# Patient Record
Sex: FEMALE | Race: WHITE | NOT HISPANIC OR LATINO | Employment: UNEMPLOYED | URBAN - METROPOLITAN AREA
[De-identification: names, ages, dates, MRNs, and addresses within clinical notes are randomized per-mention and may not be internally consistent; named-entity substitution may affect disease eponyms.]

---

## 2017-03-29 RX ORDER — LISINOPRIL 10 MG/1
TABLET ORAL
Qty: 90 TABLET | Refills: 0 | Status: SHIPPED | OUTPATIENT
Start: 2017-03-29 | End: 2017-04-10 | Stop reason: SDUPTHER

## 2017-04-10 ENCOUNTER — OFFICE VISIT (OUTPATIENT)
Dept: INTERNAL MEDICINE | Facility: CLINIC | Age: 52
End: 2017-04-10

## 2017-04-10 VITALS
HEART RATE: 82 BPM | BODY MASS INDEX: 33.59 KG/M2 | DIASTOLIC BLOOD PRESSURE: 78 MMHG | RESPIRATION RATE: 16 BRPM | SYSTOLIC BLOOD PRESSURE: 118 MMHG | WEIGHT: 205 LBS

## 2017-04-10 DIAGNOSIS — F41.9 ANXIETY: ICD-10-CM

## 2017-04-10 DIAGNOSIS — I10 ESSENTIAL HYPERTENSION: Primary | ICD-10-CM

## 2017-04-10 DIAGNOSIS — Z12.11 SCREENING FOR COLON CANCER: ICD-10-CM

## 2017-04-10 DIAGNOSIS — Z11.59 NEED FOR HEPATITIS C SCREENING TEST: ICD-10-CM

## 2017-04-10 DIAGNOSIS — F32.A DEPRESSION, UNSPECIFIED DEPRESSION TYPE: ICD-10-CM

## 2017-04-10 DIAGNOSIS — E03.9 HYPOTHYROIDISM, UNSPECIFIED TYPE: ICD-10-CM

## 2017-04-10 PROCEDURE — 99214 OFFICE O/P EST MOD 30 MIN: CPT | Performed by: INTERNAL MEDICINE

## 2017-04-10 RX ORDER — THYROID,PORK 130 MG
130 TABLET ORAL DAILY
COMMUNITY
Start: 2017-03-24 | End: 2017-10-09

## 2017-04-10 RX ORDER — FLUOXETINE HYDROCHLORIDE 20 MG/1
20 CAPSULE ORAL DAILY
Qty: 90 CAPSULE | Refills: 3 | Status: SHIPPED | OUTPATIENT
Start: 2017-04-10 | End: 2019-06-24 | Stop reason: SDUPTHER

## 2017-04-10 RX ORDER — LISINOPRIL 10 MG/1
10 TABLET ORAL DAILY
Qty: 90 TABLET | Refills: 3 | Status: SHIPPED | OUTPATIENT
Start: 2017-04-10 | End: 2018-01-30 | Stop reason: DRUGHIGH

## 2017-04-10 NOTE — PROGRESS NOTES
Subjective   Razia Sequeira is a 51 y.o. female.   Pt is here to follow up on HTN,Hypothyroidism,depression/anxiety.             History of Present Illness   Pt is here to follow up on HTN,Hypothyroidism,depression/anxiety.   Went to  for Hypothyroidism. TSH= 1.21 and Free T4= 0.8 on 3/10/2017.  LDL was 164 in 12/6/2016.   She has recently given up gluten and dairy as she is reading a book specializing in thyroid disorders as well.   She states she is feeling well and feels like all chronic issues are controlled.           The following portions of the patient's history were reviewed and updated as appropriate: allergies, current medications, past family history, past medical history, past social history, past surgical history and problem list.             Review of Systems   Constitutional: Negative.    HENT: Negative.    Eyes: Negative.    Respiratory: Negative.    Cardiovascular:        See HPI.    Gastrointestinal: Negative.    Endocrine:        See HPI.    Genitourinary: Negative.    Musculoskeletal: Negative.    Skin: Negative.    Allergic/Immunologic: Negative.    Neurological: Negative.    Hematological: Negative.    Psychiatric/Behavioral:        See HPI.                 Objective   Physical Exam   Constitutional: She is oriented to person, place, and time. She appears well-developed and well-nourished.   HENT:   Head: Normocephalic and atraumatic.   Right Ear: External ear normal.   Left Ear: External ear normal.   Nose: Nose normal.   Mouth/Throat: Oropharynx is clear and moist.   Eyes: Conjunctivae and EOM are normal. Pupils are equal, round, and reactive to light.   Neck: Normal range of motion. Neck supple. No tracheal deviation present. No thyromegaly present.   Cardiovascular: Normal rate, regular rhythm, normal heart sounds and intact distal pulses.    Pulmonary/Chest: Effort normal and breath sounds normal.   Abdominal: Soft. Bowel sounds are normal. She exhibits no distension. There  is no tenderness.   Neurological: She is alert and oriented to person, place, and time. She has normal reflexes.   Skin: Skin is warm and dry.   Psychiatric: She has a normal mood and affect.   Nursing note and vitals reviewed.               Assessment/Plan      Essential hypertension  -     lisinopril (PRINIVIL,ZESTRIL) 10 MG tablet; Take 1 tablet by mouth Daily.  -     Cancel: Comprehensive Metabolic Panel  -     Cancel: Lipid Panel  -     Cancel: CBC (No Diff)    Hypothyroidism, unspecified type  -     Cancel: T4, Free  -     Cancel: TSH    Depression, unspecified depression type  -     FLUoxetine (PROzac) 20 MG capsule; Take 1 capsule by mouth Daily.    Anxiety  -     FLUoxetine (PROzac) 20 MG capsule; Take 1 capsule by mouth Daily.    Screening for colon cancer  -     Amb referral for Screening Colonoscopy    Need for hepatitis C screening test  -     Cancel: Hepatitis C Antibody        1.) Colonoscopy referral placed.   2.) Refill chronic meds   3.) Pt will do Hep C screening with next blood draw   4.) Follow up in 6 months   5.) Instructed to check with pharmacy about Tetanus vaccine.

## 2017-08-17 ENCOUNTER — TELEPHONE (OUTPATIENT)
Dept: INTERNAL MEDICINE | Facility: CLINIC | Age: 52
End: 2017-08-17

## 2017-08-17 NOTE — TELEPHONE ENCOUNTER
----- Message from Luana Bloom sent at 8/17/2017  9:04 AM EDT -----  Contact: SELF  JOHNSON BORJA WENT TO DR ROBLERO AND WAS PRESCRIBED A TESTOSTERONE, ESTRAGIN AND PROGESTERONE COMPOUND. SHE PLANNED OF DOING HER FUP FROM [facility] WITH FILIBERTO WITH ONE OF THE APRNS BUT SHE WANTS TO KNOW IF ANYONE AT THIS OFFICE WOULD BE ABLE TO PRESCRIBE HER THIS COMPOUND.   SHE CAN BE REACHED [phone]

## 2017-08-21 NOTE — TELEPHONE ENCOUNTER
Spoke with pt and informed her of statement below. Pt is curious why testosterone is not given to women. Pt doesn't want to schedule an apt and not able to get this prescribed.

## 2017-08-21 NOTE — TELEPHONE ENCOUNTER
It just depends on the provider.  We tend not to give testosterone to women.  She can make an appt with the APRN but would need to bring in the compound to know the percentage of each.

## 2017-08-28 NOTE — TELEPHONE ENCOUNTER
This treatment is considered more alternative medicine.  So have no way of knowing if one of the new APRNs would fill it.

## 2017-08-28 NOTE — TELEPHONE ENCOUNTER
Pt informed of statement below. Pt verbalized understanding and stated that she will call back next month to see if APRN's has gotten here and will schedule apt then to discuss.

## 2017-10-02 ENCOUNTER — OFFICE VISIT (OUTPATIENT)
Dept: INTERNAL MEDICINE | Facility: CLINIC | Age: 52
End: 2017-10-02

## 2017-10-02 VITALS
RESPIRATION RATE: 18 BRPM | HEART RATE: 94 BPM | SYSTOLIC BLOOD PRESSURE: 124 MMHG | WEIGHT: 205 LBS | OXYGEN SATURATION: 99 % | DIASTOLIC BLOOD PRESSURE: 100 MMHG | BODY MASS INDEX: 33.59 KG/M2 | TEMPERATURE: 98.1 F

## 2017-10-02 DIAGNOSIS — R68.82 DECREASED LIBIDO: ICD-10-CM

## 2017-10-02 DIAGNOSIS — E03.9 HYPOTHYROIDISM, UNSPECIFIED TYPE: Primary | ICD-10-CM

## 2017-10-02 DIAGNOSIS — I10 ESSENTIAL HYPERTENSION: ICD-10-CM

## 2017-10-02 PROBLEM — Z12.11 SCREENING FOR COLON CANCER: Status: RESOLVED | Noted: 2017-04-10 | Resolved: 2017-10-02

## 2017-10-02 PROBLEM — Z11.59 NEED FOR HEPATITIS C SCREENING TEST: Status: RESOLVED | Noted: 2017-04-10 | Resolved: 2017-10-02

## 2017-10-02 LAB
ALBUMIN SERPL-MCNC: 4.5 G/DL (ref 3.2–4.8)
ALBUMIN/GLOB SERPL: 1.6 G/DL (ref 1.5–2.5)
ALP SERPL-CCNC: 111 U/L (ref 25–100)
ALT SERPL W P-5'-P-CCNC: 34 U/L (ref 7–40)
ANION GAP SERPL CALCULATED.3IONS-SCNC: 5 MMOL/L (ref 3–11)
ARTICHOKE IGE QN: 163 MG/DL (ref 0–130)
AST SERPL-CCNC: 26 U/L (ref 0–33)
BILIRUB SERPL-MCNC: 0.4 MG/DL (ref 0.3–1.2)
BUN BLD-MCNC: 11 MG/DL (ref 9–23)
BUN/CREAT SERPL: 13.8 (ref 7–25)
CALCIUM SPEC-SCNC: 10 MG/DL (ref 8.7–10.4)
CHLORIDE SERPL-SCNC: 106 MMOL/L (ref 99–109)
CHOLEST SERPL-MCNC: 231 MG/DL (ref 0–200)
CO2 SERPL-SCNC: 29 MMOL/L (ref 20–31)
CREAT BLD-MCNC: 0.8 MG/DL (ref 0.6–1.3)
DEPRECATED RDW RBC AUTO: 41.2 FL (ref 37–54)
ERYTHROCYTE [DISTWIDTH] IN BLOOD BY AUTOMATED COUNT: 13 % (ref 11.3–14.5)
ESTRADIOL SERPL HS-MCNC: 19 PG/ML
GFR SERPL CREATININE-BSD FRML MDRD: 75 ML/MIN/1.73
GLOBULIN UR ELPH-MCNC: 2.9 GM/DL
GLUCOSE BLD-MCNC: 102 MG/DL (ref 70–100)
HCT VFR BLD AUTO: 46 % (ref 34.5–44)
HDLC SERPL-MCNC: 58 MG/DL (ref 40–60)
HGB BLD-MCNC: 14.9 G/DL (ref 11.5–15.5)
MCH RBC QN AUTO: 28 PG (ref 27–31)
MCHC RBC AUTO-ENTMCNC: 32.4 G/DL (ref 32–36)
MCV RBC AUTO: 86.3 FL (ref 80–99)
PLATELET # BLD AUTO: 237 10*3/MM3 (ref 150–450)
PMV BLD AUTO: 11.6 FL (ref 6–12)
POTASSIUM BLD-SCNC: 4.8 MMOL/L (ref 3.5–5.5)
PROGEST SERPL-MCNC: 3.51 NG/ML
PROT SERPL-MCNC: 7.4 G/DL (ref 5.7–8.2)
RBC # BLD AUTO: 5.33 10*6/MM3 (ref 3.89–5.14)
SODIUM BLD-SCNC: 140 MMOL/L (ref 132–146)
T4 FREE SERPL-MCNC: 0.98 NG/DL (ref 0.89–1.76)
TRIGL SERPL-MCNC: 85 MG/DL (ref 0–150)
TSH SERPL DL<=0.05 MIU/L-ACNC: 0.01 MIU/ML (ref 0.35–5.35)
WBC NRBC COR # BLD: 4.21 10*3/MM3 (ref 3.5–10.8)

## 2017-10-02 PROCEDURE — 99214 OFFICE O/P EST MOD 30 MIN: CPT | Performed by: PHYSICIAN ASSISTANT

## 2017-10-02 PROCEDURE — 80053 COMPREHEN METABOLIC PANEL: CPT | Performed by: PHYSICIAN ASSISTANT

## 2017-10-02 PROCEDURE — 82670 ASSAY OF TOTAL ESTRADIOL: CPT | Performed by: PHYSICIAN ASSISTANT

## 2017-10-02 PROCEDURE — 84403 ASSAY OF TOTAL TESTOSTERONE: CPT | Performed by: PHYSICIAN ASSISTANT

## 2017-10-02 PROCEDURE — 84443 ASSAY THYROID STIM HORMONE: CPT | Performed by: PHYSICIAN ASSISTANT

## 2017-10-02 PROCEDURE — 84144 ASSAY OF PROGESTERONE: CPT | Performed by: PHYSICIAN ASSISTANT

## 2017-10-02 PROCEDURE — 84402 ASSAY OF FREE TESTOSTERONE: CPT | Performed by: PHYSICIAN ASSISTANT

## 2017-10-02 PROCEDURE — 80061 LIPID PANEL: CPT | Performed by: PHYSICIAN ASSISTANT

## 2017-10-02 PROCEDURE — 84439 ASSAY OF FREE THYROXINE: CPT | Performed by: PHYSICIAN ASSISTANT

## 2017-10-02 PROCEDURE — 85027 COMPLETE CBC AUTOMATED: CPT | Performed by: PHYSICIAN ASSISTANT

## 2017-10-02 PROCEDURE — 36415 COLL VENOUS BLD VENIPUNCTURE: CPT | Performed by: PHYSICIAN ASSISTANT

## 2017-10-02 NOTE — PROGRESS NOTES
Subjective   Razia Sequeira is a 52 y.o. female.   Chief Complaint   Patient presents with   • Hypothyroidism     f/u     History of Present Illness   Pt here for thyroid f/u.  In March 2017 TSH was normal.  Is on WP thyroid 130 tab, was seeing [facility] office, Alyx Butler MD who was giving compounded creams as well: testosterone, progesterone, and estradiol, for decreased libido.    Has felt the best since on this regimen.  Requests that we fill these Rxs.    Hx of complete hysterectomy.    The following portions of the patient's history were reviewed and updated as appropriate: allergies, current medications and problem list.    Review of Systems   Constitutional: Negative for fatigue.   Eyes: Negative for visual disturbance.   Respiratory: Negative for shortness of breath.    Cardiovascular: Negative for chest pain.   Neurological: Negative for headaches.   Psychiatric/Behavioral: Negative for sleep disturbance.       Objective   Physical Exam   Constitutional: She appears well-developed and well-nourished.   HENT:   Head: Normocephalic and atraumatic.   Right Ear: External ear normal.   Left Ear: External ear normal.   Eyes: Conjunctivae are normal.   Neck: No thyromegaly present.   Cardiovascular: Normal rate, regular rhythm and normal heart sounds.  Exam reveals no gallop and no friction rub.    No murmur heard.  Pulmonary/Chest: Effort normal and breath sounds normal.   Psychiatric: She has a normal mood and affect.   Vitals reviewed.  Pt is a bit confrontational.    Assessment/Plan   Raiza was seen today for hypothyroidism.    Diagnoses and all orders for this visit:    Hypothyroidism, unspecified type  -     Lipid Panel  -     TSH  -     T4, Free    Essential hypertension  -     CBC (No Diff)  -     Comprehensive Metabolic Panel    Decreased libido  -     Testosterone, Free, Total  -     Estradiol  -     Progesterone        She will test BP at home to see if diasystolic continues to be  elevated.    She understands there may be risks to being on hormone therapy as she has been doing (bioidentical hormones).  She is willing to accept these risks because of the quality of life that they provide.  She understands that if adjustment is required, then she may need to go back to Dr Butler.  She will have [facility] pharmacy send us rxs for refill.  Told her I wasn't sure if the future APRN was willing to fill these.

## 2017-10-04 ENCOUNTER — TELEPHONE (OUTPATIENT)
Dept: INTERNAL MEDICINE | Facility: CLINIC | Age: 52
End: 2017-10-04

## 2017-10-04 NOTE — TELEPHONE ENCOUNTER
----- Message from Lydia Wallace MA sent at 10/4/2017 11:58 AM EDT -----  No pharmacy able to get the WP thyroid med and wants to go back on her synthroid and cytomel.  Would like a call to discuss.       Pt [phone]

## 2017-10-05 LAB
TESTOST FREE SERPL-MCNC: 2.6 PG/ML (ref 0–4.2)
TESTOST SERPL-MCNC: 38 NG/DL (ref 3–41)

## 2017-10-06 DIAGNOSIS — E03.8 OTHER SPECIFIED HYPOTHYROIDISM: ICD-10-CM

## 2017-10-06 RX ORDER — LEVOTHYROXINE SODIUM 88 UG/1
TABLET ORAL
Qty: 30 TABLET | Refills: 5 | Status: SHIPPED | OUTPATIENT
Start: 2017-10-06 | End: 2018-05-11 | Stop reason: SDUPTHER

## 2017-10-06 RX ORDER — LEVOTHYROXINE SODIUM 88 UG/1
88 TABLET ORAL DAILY
Qty: 30 TABLET | Refills: 2 | Status: CANCELLED | OUTPATIENT
Start: 2017-10-06

## 2017-10-06 NOTE — TELEPHONE ENCOUNTER
I tried to call her yesterday to discuss.  TSH is showing that she is taking too much thyroid medication, so is even more hyper-thryoid than last year.  This can cause palpitations of the heart and diarrhea among other things.    I have sent in levothyroxin 88mcg and liothyronin 5 mg.    She will need repeat blood work in 2 months to see if this appropriate dose.    Also I spoke with 2 providers here and they do not do bio-identical hormones.  One suggested she see GYN to see if they do this.  I will send lab letter with results by mail.  Cholesterol has gone up a bit from last year and red blood cell count is eelevated along with hemocrit which is what we watch when on testosterone.  I don't know what the other doc would say is too high that would require coming off testosterone or decreasing dose.

## 2017-10-06 NOTE — TELEPHONE ENCOUNTER
----- Message from Lidia Judge sent at 10/6/2017  9:28 AM EDT -----  Patient called in regards to her thyroid medicine being completely out of stock at pharmacy. Patient stated there is an old thyroid medicine she used to be on that she has requested be called into pharmacy. Patient is out of thyroid medicine and states she needs called in today.     Pharmacy: [facility] [place] pharmacy    Call back number for patient: [phone]

## 2017-10-09 RX ORDER — LIOTHYRONINE SODIUM 5 UG/1
5 TABLET ORAL DAILY
Qty: 30 TABLET | Refills: 2 | Status: SHIPPED | OUTPATIENT
Start: 2017-10-09 | End: 2018-02-01 | Stop reason: SDUPTHER

## 2017-10-09 RX ORDER — LIOTHYRONINE SODIUM 5 UG/1
TABLET ORAL
Qty: 90 TABLET | Refills: 3 | OUTPATIENT
Start: 2017-10-09

## 2017-10-09 NOTE — TELEPHONE ENCOUNTER
Pt notified of statement below. Pt verbalized understanding. Rx Cytomel did not get sent to pharmacy, please send to pharmacy.

## 2017-10-24 ENCOUNTER — LAB (OUTPATIENT)
Dept: LAB | Facility: HOSPITAL | Age: 52
End: 2017-10-24

## 2017-10-24 ENCOUNTER — TRANSCRIBE ORDERS (OUTPATIENT)
Dept: LAB | Facility: HOSPITAL | Age: 52
End: 2017-10-24

## 2017-10-24 DIAGNOSIS — Z79.890 NEED FOR PROPHYLACTIC HORMONE REPLACEMENT THERAPY (POSTMENOPAUSAL): ICD-10-CM

## 2017-10-24 DIAGNOSIS — Z79.890 NEED FOR PROPHYLACTIC HORMONE REPLACEMENT THERAPY (POSTMENOPAUSAL): Primary | ICD-10-CM

## 2017-10-24 LAB
ESTRADIOL SERPL HS-MCNC: 28 PG/ML
FSH SERPL-ACNC: 27 MIU/ML
PROGEST SERPL-MCNC: >60 NG/ML
TESTOST SERPL-MCNC: 44.9 NG/DL
TSH SERPL DL<=0.05 MIU/L-ACNC: 0.09 MIU/ML (ref 0.35–5.35)

## 2017-10-24 PROCEDURE — 84443 ASSAY THYROID STIM HORMONE: CPT | Performed by: OBSTETRICS & GYNECOLOGY

## 2017-10-24 PROCEDURE — 36415 COLL VENOUS BLD VENIPUNCTURE: CPT | Performed by: OBSTETRICS & GYNECOLOGY

## 2017-10-24 PROCEDURE — 84144 ASSAY OF PROGESTERONE: CPT | Performed by: OBSTETRICS & GYNECOLOGY

## 2017-10-24 PROCEDURE — 84481 FREE ASSAY (FT-3): CPT | Performed by: OBSTETRICS & GYNECOLOGY

## 2017-10-24 PROCEDURE — 84402 ASSAY OF FREE TESTOSTERONE: CPT | Performed by: OBSTETRICS & GYNECOLOGY

## 2017-10-24 PROCEDURE — 83001 ASSAY OF GONADOTROPIN (FSH): CPT | Performed by: OBSTETRICS & GYNECOLOGY

## 2017-10-24 PROCEDURE — 84403 ASSAY OF TOTAL TESTOSTERONE: CPT | Performed by: OBSTETRICS & GYNECOLOGY

## 2017-10-24 PROCEDURE — 82670 ASSAY OF TOTAL ESTRADIOL: CPT | Performed by: OBSTETRICS & GYNECOLOGY

## 2017-10-24 PROCEDURE — 84270 ASSAY OF SEX HORMONE GLOBUL: CPT | Performed by: OBSTETRICS & GYNECOLOGY

## 2017-10-24 PROCEDURE — 82627 DEHYDROEPIANDROSTERONE: CPT | Performed by: OBSTETRICS & GYNECOLOGY

## 2017-10-25 LAB
DHEA-S SERPL-MCNC: 333.2 UG/DL (ref 41.2–243.7)
SHBG SERPL-SCNC: 38.5 NMOL/L (ref 17.3–125)
T3FREE SERPL-MCNC: 3.5 PG/ML (ref 2–4.4)

## 2017-10-26 LAB — TESTOST FREE SERPL-MCNC: 3.7 PG/ML (ref 0–4.2)

## 2017-12-21 ENCOUNTER — OFFICE VISIT (OUTPATIENT)
Dept: FAMILY MEDICINE CLINIC | Facility: CLINIC | Age: 52
End: 2017-12-21

## 2017-12-21 VITALS
SYSTOLIC BLOOD PRESSURE: 150 MMHG | HEART RATE: 88 BPM | TEMPERATURE: 98.7 F | BODY MASS INDEX: 34.62 KG/M2 | DIASTOLIC BLOOD PRESSURE: 110 MMHG | HEIGHT: 66 IN | WEIGHT: 215.4 LBS | RESPIRATION RATE: 20 BRPM

## 2017-12-21 DIAGNOSIS — E03.8 HYPOTHYROIDISM DUE TO HASHIMOTO'S THYROIDITIS: Primary | ICD-10-CM

## 2017-12-21 DIAGNOSIS — I10 ESSENTIAL HYPERTENSION: ICD-10-CM

## 2017-12-21 DIAGNOSIS — Z76.89 ESTABLISHING CARE WITH NEW DOCTOR, ENCOUNTER FOR: ICD-10-CM

## 2017-12-21 DIAGNOSIS — R63.5 WEIGHT GAIN: ICD-10-CM

## 2017-12-21 DIAGNOSIS — E06.3 HYPOTHYROIDISM DUE TO HASHIMOTO'S THYROIDITIS: Primary | ICD-10-CM

## 2017-12-21 PROCEDURE — 99214 OFFICE O/P EST MOD 30 MIN: CPT | Performed by: NURSE PRACTITIONER

## 2017-12-21 NOTE — PROGRESS NOTES
Subjective   Razia Sequeira is a 52 y.o. female.     History of Present Illness   NP to establish care, moved to  recently, a previous pt of Dr Marquis Jaime in [place]  Thyroid problem taking medication but has been gaining weight (10 lbs) and wants to have labs rechecked  WP thyroid no longer available so taking Cytomel and Levothyroxine  Just joined the gym for new years resolution    BP up today but had it checked at gyn office and it was normal there  Taking Lisinopril daily  Has not been taking it at home  No CP, HA, dizziness or swelling    On HRT   Up to date on health maintenance  Does not get a flu vaccine  Mammogram every 2 years, due next year      The following portions of the patient's history were reviewed and updated as appropriate: allergies, current medications, past family history, past medical history, past social history, past surgical history and problem list.    Review of Systems   Constitutional: Positive for fatigue and unexpected weight change.   HENT: Negative for trouble swallowing.    Eyes: Negative.    Respiratory: Negative for chest tightness.    Cardiovascular: Negative.  Negative for chest pain, palpitations and leg swelling.   Gastrointestinal: Negative for constipation, diarrhea and nausea.   Endocrine: Negative.    Genitourinary: Negative.    Musculoskeletal: Negative.    Skin: Negative.    Neurological: Negative for dizziness, light-headedness and headaches.   Hematological: Negative.    Psychiatric/Behavioral: Negative.        Objective   Physical Exam   Constitutional: She is oriented to person, place, and time. Vital signs are normal. She appears well-developed and well-nourished. No distress.   HENT:   Head: Normocephalic.   Right Ear: Tympanic membrane, external ear and ear canal normal.   Left Ear: Tympanic membrane, external ear and ear canal normal.   Nose: Nose normal. No mucosal edema or rhinorrhea. Right sinus exhibits no maxillary sinus tenderness and no frontal  sinus tenderness. Left sinus exhibits no maxillary sinus tenderness and no frontal sinus tenderness.   Mouth/Throat: Uvula is midline, oropharynx is clear and moist and mucous membranes are normal.   Eyes: Conjunctivae and lids are normal. Pupils are equal, round, and reactive to light.   Neck: Trachea normal and normal range of motion. Neck supple. No JVD present. Carotid bruit is not present. No thyroid mass and no thyromegaly present.   Cardiovascular: Normal rate, regular rhythm, S1 normal, S2 normal, normal heart sounds and intact distal pulses.    Pulmonary/Chest: Effort normal and breath sounds normal.   Abdominal: Soft. Normal appearance and bowel sounds are normal.   Musculoskeletal: Normal range of motion.   Lymphadenopathy:        Head (right side): No tonsillar, no preauricular, no posterior auricular and no occipital adenopathy present.        Head (left side): No tonsillar, no preauricular, no posterior auricular and no occipital adenopathy present.     She has no cervical adenopathy.   Neurological: She is alert and oriented to person, place, and time. She has normal reflexes.   Skin: Skin is warm, dry and intact. No rash noted. No cyanosis. Nails show no clubbing.   Psychiatric: She has a normal mood and affect. Her speech is normal and behavior is normal. Judgment and thought content normal. Cognition and memory are normal.   Nursing note and vitals reviewed.      Assessment/Plan   Razia was seen today for establish care, hypothyroidism, hypertension and weight gain.    Diagnoses and all orders for this visit:    Hypothyroidism due to Hashimoto's thyroiditis  -     TSH  -     T4  -     Comprehensive Metabolic Panel    Essential hypertension    Establishing care with new doctor, encounter for    Weight gain    will check labs and notify pt of results  Will have pt monitor BP over the next week if it remains elevated will need to change BP medication, pt agrees  Will decide follow up pending lab  results.

## 2017-12-22 LAB
ALBUMIN SERPL-MCNC: 4.5 G/DL (ref 3.2–4.8)
ALBUMIN/GLOB SERPL: 1.8 G/DL (ref 1.5–2.5)
ALP SERPL-CCNC: 93 U/L (ref 25–100)
ALT SERPL-CCNC: 62 U/L (ref 7–40)
AST SERPL-CCNC: 54 U/L (ref 0–33)
BILIRUB SERPL-MCNC: 0.6 MG/DL (ref 0.3–1.2)
BUN SERPL-MCNC: 13 MG/DL (ref 9–23)
BUN/CREAT SERPL: 16.3 (ref 7–25)
CALCIUM SERPL-MCNC: 9.7 MG/DL (ref 8.7–10.4)
CHLORIDE SERPL-SCNC: 105 MMOL/L (ref 99–109)
CO2 SERPL-SCNC: 26 MMOL/L (ref 20–31)
CREAT SERPL-MCNC: 0.8 MG/DL (ref 0.6–1.3)
GLOBULIN SER CALC-MCNC: 2.5 GM/DL
GLUCOSE SERPL-MCNC: 90 MG/DL (ref 70–100)
POTASSIUM SERPL-SCNC: 4.9 MMOL/L (ref 3.5–5.5)
PROT SERPL-MCNC: 7 G/DL (ref 5.7–8.2)
SODIUM SERPL-SCNC: 140 MMOL/L (ref 132–146)
T4 SERPL-MCNC: 7.5 MCG/DL (ref 4.7–11.4)
TSH SERPL DL<=0.005 MIU/L-ACNC: 0.39 MIU/ML (ref 0.35–5.35)

## 2018-01-29 ENCOUNTER — TELEPHONE (OUTPATIENT)
Dept: FAMILY MEDICINE CLINIC | Facility: CLINIC | Age: 53
End: 2018-01-29

## 2018-01-29 NOTE — TELEPHONE ENCOUNTER
----- Message from Paola Forbes sent at 1/29/2018  9:13 AM EST -----  Contact: FADI; MED REFILL   Pt blood pressure has continued to be high since christmas. She stated that it was dicussed that It could be increased. She would like to have this done if possible please.   She called last week on the 24 th and left a message but no message was found in pt chart.  She is needing this soon.     Thank you so much.     Pharmacy-   [facility] pharmacy      Call back   [phone]

## 2018-01-30 RX ORDER — LIOTHYRONINE SODIUM 5 UG/1
TABLET ORAL
Qty: 30 TABLET | Refills: 2 | OUTPATIENT
Start: 2018-01-30

## 2018-01-30 RX ORDER — LISINOPRIL AND HYDROCHLOROTHIAZIDE 20; 12.5 MG/1; MG/1
1 TABLET ORAL DAILY
Qty: 30 TABLET | Refills: 1 | Status: SHIPPED | OUTPATIENT
Start: 2018-01-30 | End: 2018-04-27 | Stop reason: SDUPTHER

## 2018-01-30 NOTE — TELEPHONE ENCOUNTER
Will change BP med and would like pt to make appointment to follow up to make sure this is the right change. ajc

## 2018-02-01 RX ORDER — LIOTHYRONINE SODIUM 5 UG/1
5 TABLET ORAL DAILY
Qty: 90 TABLET | Refills: 1 | Status: SHIPPED | OUTPATIENT
Start: 2018-02-01 | End: 2019-01-22 | Stop reason: SDUPTHER

## 2018-04-27 RX ORDER — LISINOPRIL AND HYDROCHLOROTHIAZIDE 20; 12.5 MG/1; MG/1
TABLET ORAL
Qty: 30 TABLET | Refills: 1 | Status: SHIPPED | OUTPATIENT
Start: 2018-04-27 | End: 2018-07-05 | Stop reason: SDUPTHER

## 2018-05-11 ENCOUNTER — OFFICE VISIT (OUTPATIENT)
Dept: FAMILY MEDICINE CLINIC | Facility: CLINIC | Age: 53
End: 2018-05-11

## 2018-05-11 ENCOUNTER — TELEPHONE (OUTPATIENT)
Dept: FAMILY MEDICINE CLINIC | Facility: CLINIC | Age: 53
End: 2018-05-11

## 2018-05-11 VITALS
WEIGHT: 220 LBS | RESPIRATION RATE: 16 BRPM | BODY MASS INDEX: 35.36 KG/M2 | TEMPERATURE: 98 F | SYSTOLIC BLOOD PRESSURE: 118 MMHG | HEART RATE: 88 BPM | HEIGHT: 66 IN | DIASTOLIC BLOOD PRESSURE: 80 MMHG

## 2018-05-11 DIAGNOSIS — E03.8 OTHER SPECIFIED HYPOTHYROIDISM: ICD-10-CM

## 2018-05-11 PROCEDURE — 99213 OFFICE O/P EST LOW 20 MIN: CPT | Performed by: NURSE PRACTITIONER

## 2018-05-11 RX ORDER — LEVOTHYROXINE SODIUM 88 UG/1
88 TABLET ORAL DAILY
Qty: 30 TABLET | Refills: 1 | Status: SHIPPED | OUTPATIENT
Start: 2018-05-11 | End: 2019-01-22 | Stop reason: SDUPTHER

## 2018-05-11 NOTE — PROGRESS NOTES
Subjective   Razia Sequeira is a 52 y.o. female.     History of Present Illness   Follow up for Hypothyroidism  Needs refill on thyroid meds   Wants to be changed to [employer] [place] thyroid, has been on it before, wants to be on a more natural medication  Was seeing a Homeopathic doctor but she was cash only did not take insurance and it was expensive    The following portions of the patient's history were reviewed and updated as appropriate: allergies, current medications, past family history, past medical history, past social history, past surgical history and problem list.    Review of Systems   Constitutional: Positive for unexpected weight gain.   HENT: Negative for trouble swallowing.    Endocrine: Negative for cold intolerance and heat intolerance.       Objective   Physical Exam   Constitutional: She is oriented to person, place, and time. She appears well-developed and well-nourished.   HENT:   Head: Normocephalic.   Nose: Nose normal.   Neck: Neck supple. No thyromegaly present.   Cardiovascular: Normal rate, regular rhythm and normal heart sounds.    Pulmonary/Chest: Effort normal and breath sounds normal.   Neurological: She is alert and oriented to person, place, and time.   Psychiatric: She has a normal mood and affect. Her behavior is normal. Judgment and thought content normal.   Nursing note and vitals reviewed.        Assessment/Plan   Razia was seen today for hypothyroidism.    Diagnoses and all orders for this visit:    Other specified hypothyroidism  -     levothyroxine (SYNTHROID, LEVOTHROID) 88 MCG tablet; Take 1 tablet by mouth Daily.    will refill Levothyroxine and offered to refer pt to Endocrinology but she declined   I told pt I do not want to start her on [place] thyroid that I do not have a lot of experience with this medication and would prefer that she see specialist if she wants to change her medication. She declined, she says she has seen 2 other Endocrinologist and neither would change  her medication.

## 2018-05-11 NOTE — TELEPHONE ENCOUNTER
----- Message from Paola Forbes sent at 5/11/2018 11:51 AM EDT -----  Contact: BRIGITTE; PT QUESTION  PT HAS AN UPCOMING APPT WITH DR. CASIANO SHE WANTED TO KNOW IF YOU WOULD STILL BE ABLE TO WRITE FOR HER THYROID MEDICATION UNITIL July WHEN HER APPT IS.       CALL BACK   [phone]

## 2018-06-26 ENCOUNTER — TRANSCRIBE ORDERS (OUTPATIENT)
Dept: ADMINISTRATIVE | Facility: HOSPITAL | Age: 53
End: 2018-06-26

## 2018-06-26 DIAGNOSIS — Z12.31 VISIT FOR SCREENING MAMMOGRAM: Primary | ICD-10-CM

## 2018-07-05 RX ORDER — LISINOPRIL AND HYDROCHLOROTHIAZIDE 20; 12.5 MG/1; MG/1
TABLET ORAL
Qty: 90 TABLET | Refills: 1 | Status: SHIPPED | OUTPATIENT
Start: 2018-07-05 | End: 2019-01-22 | Stop reason: SDUPTHER

## 2018-08-01 ENCOUNTER — HOSPITAL ENCOUNTER (OUTPATIENT)
Dept: MAMMOGRAPHY | Facility: HOSPITAL | Age: 53
Discharge: HOME | End: 2018-08-01
Attending: OBSTETRICS & GYNECOLOGY | Admitting: OBSTETRICS & GYNECOLOGY

## 2018-08-01 DIAGNOSIS — Z12.31 VISIT FOR SCREENING MAMMOGRAM: ICD-10-CM

## 2018-08-01 PROCEDURE — 77067 SCR MAMMO BI INCL CAD: CPT | Performed by: RADIOLOGY

## 2018-08-01 PROCEDURE — 77063 BREAST TOMOSYNTHESIS BI: CPT

## 2018-08-01 PROCEDURE — 77067 SCR MAMMO BI INCL CAD: CPT

## 2018-08-01 PROCEDURE — 77063 BREAST TOMOSYNTHESIS BI: CPT | Performed by: RADIOLOGY

## 2018-08-08 ENCOUNTER — HOSPITAL ENCOUNTER (OUTPATIENT)
Dept: MAMMOGRAPHY | Facility: HOSPITAL | Age: 53
Discharge: HOME | End: 2018-08-08
Admitting: RADIOLOGY

## 2018-08-08 ENCOUNTER — HOSPITAL ENCOUNTER (OUTPATIENT)
Dept: ULTRASOUND IMAGING | Facility: HOSPITAL | Age: 53
Discharge: HOME | End: 2018-08-08

## 2018-08-08 DIAGNOSIS — R92.8 ABNORMAL MAMMOGRAM: ICD-10-CM

## 2018-08-08 PROCEDURE — G0279 TOMOSYNTHESIS, MAMMO: HCPCS

## 2018-08-08 PROCEDURE — 76642 ULTRASOUND BREAST LIMITED: CPT | Performed by: RADIOLOGY

## 2018-08-08 PROCEDURE — 77066 DX MAMMO INCL CAD BI: CPT | Performed by: RADIOLOGY

## 2018-08-08 PROCEDURE — 77066 DX MAMMO INCL CAD BI: CPT

## 2018-08-08 PROCEDURE — 76642 ULTRASOUND BREAST LIMITED: CPT

## 2018-08-08 PROCEDURE — 77062 BREAST TOMOSYNTHESIS BI: CPT | Performed by: RADIOLOGY

## 2018-10-29 ENCOUNTER — LAB (OUTPATIENT)
Dept: LAB | Facility: HOSPITAL | Age: 53
End: 2018-10-29

## 2018-10-29 ENCOUNTER — TRANSCRIBE ORDERS (OUTPATIENT)
Dept: LAB | Facility: HOSPITAL | Age: 53
End: 2018-10-29

## 2018-10-29 DIAGNOSIS — N95.1 SYMPTOMATIC MENOPAUSAL OR FEMALE CLIMACTERIC STATES: ICD-10-CM

## 2018-10-29 DIAGNOSIS — N95.1 SYMPTOMATIC MENOPAUSAL OR FEMALE CLIMACTERIC STATES: Primary | ICD-10-CM

## 2018-10-29 LAB
ESTRADIOL SERPL HS-MCNC: 24 PG/ML
FSH SERPL-ACNC: 27.4 MIU/ML
LH SERPL-ACNC: 15.6 MIU/ML
PROGEST SERPL-MCNC: <0.15 NG/ML
TESTOST SERPL-MCNC: 33.52 NG/DL (ref 0–780.1)
TSH SERPL DL<=0.05 MIU/L-ACNC: 0.48 MIU/ML (ref 0.35–5.35)

## 2018-10-29 PROCEDURE — 36415 COLL VENOUS BLD VENIPUNCTURE: CPT

## 2018-10-29 PROCEDURE — 82627 DEHYDROEPIANDROSTERONE: CPT

## 2018-10-29 PROCEDURE — 84443 ASSAY THYROID STIM HORMONE: CPT

## 2018-10-29 PROCEDURE — 84402 ASSAY OF FREE TESTOSTERONE: CPT

## 2018-10-29 PROCEDURE — 82670 ASSAY OF TOTAL ESTRADIOL: CPT

## 2018-10-29 PROCEDURE — 84403 ASSAY OF TOTAL TESTOSTERONE: CPT

## 2018-10-29 PROCEDURE — 83001 ASSAY OF GONADOTROPIN (FSH): CPT

## 2018-10-29 PROCEDURE — 83002 ASSAY OF GONADOTROPIN (LH): CPT

## 2018-10-29 PROCEDURE — 84144 ASSAY OF PROGESTERONE: CPT

## 2018-10-30 LAB — DHEA-S SERPL-MCNC: 74 UG/DL (ref 29.4–220.5)

## 2018-10-31 LAB — TESTOST FREE SERPL-MCNC: 3.6 PG/ML (ref 0–4.2)

## 2019-01-22 ENCOUNTER — OFFICE VISIT (OUTPATIENT)
Dept: FAMILY MEDICINE CLINIC | Facility: CLINIC | Age: 54
End: 2019-01-22

## 2019-01-22 VITALS
DIASTOLIC BLOOD PRESSURE: 78 MMHG | SYSTOLIC BLOOD PRESSURE: 128 MMHG | RESPIRATION RATE: 16 BRPM | WEIGHT: 220 LBS | HEART RATE: 84 BPM | BODY MASS INDEX: 35.53 KG/M2 | TEMPERATURE: 97.2 F

## 2019-01-22 DIAGNOSIS — E55.9 VITAMIN D DEFICIENCY: ICD-10-CM

## 2019-01-22 DIAGNOSIS — D22.9 NUMEROUS SKIN MOLES: ICD-10-CM

## 2019-01-22 DIAGNOSIS — E53.8 LOW SERUM VITAMIN B12: ICD-10-CM

## 2019-01-22 DIAGNOSIS — Z13.220 SCREENING, LIPID: ICD-10-CM

## 2019-01-22 DIAGNOSIS — E03.8 OTHER SPECIFIED HYPOTHYROIDISM: Primary | ICD-10-CM

## 2019-01-22 DIAGNOSIS — I10 WELL-CONTROLLED HYPERTENSION: ICD-10-CM

## 2019-01-22 PROCEDURE — 99214 OFFICE O/P EST MOD 30 MIN: CPT | Performed by: NURSE PRACTITIONER

## 2019-01-22 RX ORDER — LIOTHYRONINE SODIUM 5 UG/1
5 TABLET ORAL DAILY
Qty: 90 TABLET | Refills: 1 | Status: SHIPPED | OUTPATIENT
Start: 2019-01-22 | End: 2020-02-07

## 2019-01-22 RX ORDER — LEVOTHYROXINE SODIUM 88 UG/1
88 TABLET ORAL DAILY
Qty: 90 TABLET | Refills: 1 | Status: SHIPPED | OUTPATIENT
Start: 2019-01-22 | End: 2019-06-12 | Stop reason: SDUPTHER

## 2019-01-22 RX ORDER — ESTRADIOL 0.04 MG/D
1 FILM, EXTENDED RELEASE TRANSDERMAL 2 TIMES WEEKLY
COMMUNITY
Start: 2019-01-21 | End: 2021-04-20 | Stop reason: SDUPTHER

## 2019-01-22 RX ORDER — LISINOPRIL AND HYDROCHLOROTHIAZIDE 20; 12.5 MG/1; MG/1
1 TABLET ORAL DAILY
Qty: 90 TABLET | Refills: 1 | Status: SHIPPED | OUTPATIENT
Start: 2019-01-22 | End: 2019-07-28 | Stop reason: SDUPTHER

## 2019-01-22 NOTE — PROGRESS NOTES
Subjective   Razia Sequeira is a 53 y.o. female.     History of Present Illness   Needs refill on her meds  Hypothyroid  Went to see Dr Villanueva and tried hormone replacement and supplements that he suggested but did not feel any better actually felt worse  Wants to go back on Synthroid and needs thyroid labs checked  She had to go back to Gyn also to return to HRT with patch.   HTN  Stable on current medications  No CP, swelling, cough or dizziness, palpitations  Low B12  Her Vit B12 was low on her labs and she was given a B12 injection which made her feel much better but she just did not want to give herself an injection each month  She has numerous moles that she would like to see Dermatology about    The following portions of the patient's history were reviewed and updated as appropriate: allergies, current medications, past family history, past medical history, past social history, past surgical history and problem list.    Review of Systems   Constitutional: Positive for fatigue. Negative for unexpected weight gain and unexpected weight loss.   HENT: Negative.    Respiratory: Negative.    Cardiovascular: Negative.  Negative for chest pain, palpitations and leg swelling.   Gastrointestinal: Negative.    Genitourinary: Negative.  Menstrual problem: hot flashes.   Musculoskeletal: Negative.    Skin: Negative.    Allergic/Immunologic: Negative.    Neurological: Negative.  Negative for dizziness, weakness, light-headedness and numbness.   Hematological: Negative.    Psychiatric/Behavioral: Negative.  Negative for sleep disturbance. The patient is not nervous/anxious.        Objective   Physical Exam   Constitutional: She is oriented to person, place, and time. She appears well-developed and well-nourished.   HENT:   Head: Normocephalic.   Right Ear: External ear normal.   Left Ear: External ear normal.   Nose: Nose normal.   Mouth/Throat: Uvula is midline and oropharynx is clear and moist.   Eyes: Lids are normal.  Pupils are equal, round, and reactive to light.   Neck: Neck supple. No JVD present. Carotid bruit is not present. No thyroid mass and no thyromegaly present.   Cardiovascular: Normal rate, regular rhythm and normal heart sounds.   Pulmonary/Chest: Effort normal and breath sounds normal.   Musculoskeletal: She exhibits no edema.   Lymphadenopathy:     She has no cervical adenopathy.   Neurological: She is alert and oriented to person, place, and time.   Skin: Skin is warm and dry. Capillary refill takes less than 2 seconds. No rash noted.   Psychiatric: She has a normal mood and affect. Her behavior is normal. Judgment and thought content normal.   Nursing note and vitals reviewed.        Assessment/Plan   Razia was seen today for follow-up.    Diagnoses and all orders for this visit:    Other specified hypothyroidism  -     levothyroxine (SYNTHROID, LEVOTHROID) 88 MCG tablet; Take 1 tablet by mouth Daily.  -     liothyronine (CYTOMEL) 5 MCG tablet; Take 1 tablet by mouth Daily.  -     TSH Rfx On Abnormal To Free T4; Future    Well-controlled hypertension  -     lisinopril-hydrochlorothiazide (PRINZIDE,ZESTORETIC) 20-12.5 MG per tablet; Take 1 tablet by mouth Daily.    Numerous skin moles  -     Ambulatory Referral to Dermatology    Low serum vitamin B12  -     Vitamin B12; Future    Vitamin D deficiency  -     Vitamin D 25 hydroxy; Future    Screening, lipid  -     Lipid panel; Future      Pt will return to office when fasting to have labs  Will notify pt of results  Will refill meds as requested  Recommend Dr Judge in [place]

## 2019-01-27 ENCOUNTER — RESULTS ENCOUNTER (OUTPATIENT)
Dept: FAMILY MEDICINE CLINIC | Facility: CLINIC | Age: 54
End: 2019-01-27

## 2019-01-27 DIAGNOSIS — E53.8 LOW SERUM VITAMIN B12: ICD-10-CM

## 2019-01-27 DIAGNOSIS — E03.8 OTHER SPECIFIED HYPOTHYROIDISM: ICD-10-CM

## 2019-01-27 DIAGNOSIS — Z13.220 SCREENING, LIPID: ICD-10-CM

## 2019-01-27 DIAGNOSIS — E55.9 VITAMIN D DEFICIENCY: ICD-10-CM

## 2019-05-29 DIAGNOSIS — I10 WELL-CONTROLLED HYPERTENSION: Primary | ICD-10-CM

## 2019-06-03 ENCOUNTER — RESULTS ENCOUNTER (OUTPATIENT)
Dept: FAMILY MEDICINE CLINIC | Facility: CLINIC | Age: 54
End: 2019-06-03

## 2019-06-03 DIAGNOSIS — I10 WELL-CONTROLLED HYPERTENSION: ICD-10-CM

## 2019-06-10 ENCOUNTER — OFFICE VISIT (OUTPATIENT)
Dept: FAMILY MEDICINE CLINIC | Facility: CLINIC | Age: 54
End: 2019-06-10

## 2019-06-10 VITALS
SYSTOLIC BLOOD PRESSURE: 138 MMHG | HEART RATE: 88 BPM | BODY MASS INDEX: 34.23 KG/M2 | RESPIRATION RATE: 18 BRPM | WEIGHT: 212 LBS | TEMPERATURE: 98.3 F | DIASTOLIC BLOOD PRESSURE: 86 MMHG

## 2019-06-10 DIAGNOSIS — F41.8 SITUATIONAL ANXIETY: Primary | ICD-10-CM

## 2019-06-10 PROCEDURE — 99213 OFFICE O/P EST LOW 20 MIN: CPT | Performed by: FAMILY MEDICINE

## 2019-06-10 RX ORDER — BUSPIRONE HYDROCHLORIDE 15 MG/1
15 TABLET ORAL 3 TIMES DAILY
Qty: 90 TABLET | Refills: 2 | Status: SHIPPED | OUTPATIENT
Start: 2019-06-10 | End: 2019-07-23

## 2019-06-10 RX ORDER — ALPRAZOLAM 0.5 MG/1
0.5 TABLET ORAL 3 TIMES DAILY PRN
Qty: 20 TABLET | Refills: 0 | Status: SHIPPED | OUTPATIENT
Start: 2019-06-10 | End: 2019-07-23

## 2019-06-10 NOTE — PROGRESS NOTES
Subjective   Razia Sequeira is a 53 y.o. female.   Chief Complaint   Patient presents with   • Anxiety     getting worse x 1 week, daughter just announced shes getting       She has been experiencing increased anxiety, after her daughter announced that she is getting . This was very sudden decision and planning to wed in the near future.   Over the years, she has had anxiety and had to be treated temporarily. In the past, she has taken buspirone with good results, however takes time for it to work.   Not sleeping throughout the night, only a couple of hours. Crying often. She has been speaking with her sister, who is helping talk her through this.         Anxiety   Presents for initial visit. Onset was 1 to 4 weeks ago. Symptoms include decreased concentration, excessive worry, insomnia, nervous/anxious behavior, panic and restlessness. Patient reports no chest pain, depressed mood, palpitations, shortness of breath or suicidal ideas. Symptoms occur constantly. The severity of symptoms is causing significant distress. The symptoms are aggravated by family issues. The quality of sleep is poor. Nighttime awakenings: several.     Risk factors include a major life event. Her past medical history is significant for anxiety/panic attacks. There is no history of depression. Past treatments include non-benzodiazephine anxiolytics and counseling (CBT). The treatment provided significant relief. Compliance with prior treatments has been good.        The following portions of the patient's history were reviewed and updated as appropriate: allergies, current medications, past family history, past medical history, past social history, past surgical history and problem list.    Review of Systems   Constitutional: Negative for activity change and appetite change.   Respiratory: Negative for chest tightness and shortness of breath.    Cardiovascular: Negative for chest pain and palpitations.   Gastrointestinal:  Negative.    Neurological: Negative.    Hematological: Negative.    Psychiatric/Behavioral: Positive for decreased concentration and sleep disturbance. Negative for agitation, suicidal ideas and depressed mood. The patient is nervous/anxious and has insomnia.        Objective   Physical Exam   Constitutional: She appears well-developed and well-nourished.   HENT:   Head: Normocephalic and atraumatic.   Right Ear: External ear normal.   Left Ear: External ear normal.   Nose: Nose normal.   Eyes: Conjunctivae are normal.   Cardiovascular: Normal rate, regular rhythm and normal heart sounds.   Pulmonary/Chest: Effort normal and breath sounds normal.   Neurological: She is alert.   Skin: Skin is warm and dry.   Psychiatric: Her speech is normal and behavior is normal. Her mood appears anxious.   Cries easily when discussing current situation with her daughter   Nursing note and vitals reviewed.        Assessment/Plan   Raiza was seen today for anxiety.    Diagnoses and all orders for this visit:    Situational anxiety  -     busPIRone (BUSPAR) 15 MG tablet; Take 1 tablet by mouth 3 (Three) Times a Day.  -     ALPRAZolam (XANAX) 0.5 MG tablet; Take 1 tablet by mouth 3 (Three) Times a Day As Needed for Anxiety or Sleep.      Will have her resume Buspirone.   A temporary supply of alprazolam was provided to help with panic attacks and sleep at night. She is aware that this is only intended for prn use, won't be a chronic medication. PATY query complete. Treatment plan to include limited course of prescribed  controlled substance. Risks including addiction, benefits, and alternatives presented to patient.

## 2019-06-10 NOTE — PATIENT INSTRUCTIONS
Go to the nearest ER or return to clinic if symptoms worsen, fever/chill develop    Living With Anxiety  After being diagnosed with an anxiety disorder, you may be relieved to know why you have felt or behaved a certain way. It is natural to also feel overwhelmed about the treatment ahead and what it will mean for your life. With care and support, you can manage this condition and recover from it.  How to cope with anxiety  Dealing with stress  Stress is your body’s reaction to life changes and events, both good and bad. Stress can last just a few hours or it can be ongoing. Stress can play a major role in anxiety, so it is important to learn both how to cope with stress and how to think about it differently.  Talk with your health care provider or a counselor to learn more about stress reduction. He or she may suggest some stress reduction techniques, such as:  · Music therapy. This can include creating or listening to music that you enjoy and that inspires you.  · Mindfulness-based meditation. This involves being aware of your normal breaths, rather than trying to control your breathing. It can be done while sitting or walking.  · Centering prayer. This is a kind of meditation that involves focusing on a word, phrase, or sacred image that is meaningful to you and that brings you peace.  · Deep breathing. To do this, expand your stomach and inhale slowly through your nose. Hold your breath for 3-5 seconds. Then exhale slowly, allowing your stomach muscles to relax.  · Self-talk. This is a skill where you identify thought patterns that lead to anxiety reactions and correct those thoughts.  · Muscle relaxation. This involves tensing muscles then relaxing them.    Choose a stress reduction technique that fits your lifestyle and personality. Stress reduction techniques take time and practice. Set aside 5-15 minutes a day to do them. Therapists can offer training in these techniques. The training may be covered by some  insurance plans. Other things you can do to manage stress include:  · Keeping a stress diary. This can help you learn what triggers your stress and ways to control your response.  · Thinking about how you respond to certain situations. You may not be able to control everything, but you can control your reaction.  · Making time for activities that help you relax, and not feeling guilty about spending your time in this way.    Therapy combined with coping and stress-reduction skills provides the best chance for successful treatment.  Medicines  Medicines can help ease symptoms. Medicines for anxiety include:  · Anti-anxiety drugs.  · Antidepressants.  · Beta-blockers.    Medicines may be used as the main treatment for anxiety disorder, along with therapy, or if other treatments are not working. Medicines should be prescribed by a health care provider.  Relationships  Relationships can play a big part in helping you recover. Try to spend more time connecting with trusted friends and family members. Consider going to couples counseling, taking family education classes, or going to family therapy. Therapy can help you and others better understand the condition.  How to recognize changes in your condition  Everyone has a different response to treatment for anxiety. Recovery from anxiety happens when symptoms decrease and stop interfering with your daily activities at home or work. This may mean that you will start to:  · Have better concentration and focus.  · Sleep better.  · Be less irritable.  · Have more energy.  · Have improved memory.    It is important to recognize when your condition is getting worse. Contact your health care provider if your symptoms interfere with home or work and you do not feel like your condition is improving.  Where to find help and support:  You can get help and support from these sources:  · Self-help groups.  · Online and community organizations.  · A trusted spiritual leader.  · Couples  counseling.  · Family education classes.  · Family therapy.    Follow these instructions at home:  · Eat a healthy diet that includes plenty of vegetables, fruits, whole grains, low-fat dairy products, and lean protein. Do not eat a lot of foods that are high in solid fats, added sugars, or salt.  · Exercise. Most adults should do the following:  ? Exercise for at least 150 minutes each week. The exercise should increase your heart rate and make you sweat (moderate-intensity exercise).  ? Strengthening exercises at least twice a week.  · Cut down on caffeine, tobacco, alcohol, and other potentially harmful substances.  · Get the right amount and quality of sleep. Most adults need 7-9 hours of sleep each night.  · Make choices that simplify your life.  · Take over-the-counter and prescription medicines only as told by your health care provider.  · Avoid caffeine, alcohol, and certain over-the-counter cold medicines. These may make you feel worse. Ask your pharmacist which medicines to avoid.  · Keep all follow-up visits as told by your health care provider. This is important.  Questions to ask your health care provider  · Would I benefit from therapy?  · How often should I follow up with a health care provider?  · How long do I need to take medicine?  · Are there any long-term side effects of my medicine?  · Are there any alternatives to taking medicine?  Contact a health care provider if:  · You have a hard time staying focused or finishing daily tasks.  · You spend many hours a day feeling worried about everyday life.  · You become exhausted by worry.  · You start to have headaches, feel tense, or have nausea.  · You urinate more than normal.  · You have diarrhea.  Get help right away if:  · You have a racing heart and shortness of breath.  · You have thoughts of hurting yourself or others.  If you ever feel like you may hurt yourself or others, or have thoughts about taking your own life, get help right away. You  can go to your nearest emergency department or call:  · Your local emergency services (911 in the [place]  · A suicide crisis helpline, such as the National Suicide Prevention Lifeline at 1-656.587.9512. This is open 24-hours a day.    Summary  · Taking steps to deal with stress can help calm you.  · Medicines cannot cure anxiety disorders, but they can help ease symptoms.  · Family, friends, and partners can play a big part in helping you recover from an anxiety disorder.  This information is not intended to replace advice given to you by your health care provider. Make sure you discuss any questions you have with your health care provider.  Document Released: 12/12/2017 Document Revised: 12/12/2017 Document Reviewed: 12/12/2017  Elsevier Interactive Patient Education © 2019 [employer]

## 2019-06-11 LAB
25(OH)D3+25(OH)D2 SERPL-MCNC: 27 NG/ML (ref 30–100)
ALBUMIN SERPL-MCNC: 5.2 G/DL (ref 3.5–5.2)
ALBUMIN/GLOB SERPL: 1.8 G/DL
ALP SERPL-CCNC: 73 U/L (ref 39–117)
ALT SERPL-CCNC: 38 U/L (ref 1–33)
AST SERPL-CCNC: 30 U/L (ref 1–32)
BILIRUB SERPL-MCNC: 0.6 MG/DL (ref 0.2–1.2)
BUN SERPL-MCNC: 11 MG/DL (ref 6–20)
BUN/CREAT SERPL: 11.5 (ref 7–25)
CALCIUM SERPL-MCNC: 10.4 MG/DL (ref 8.6–10.5)
CHLORIDE SERPL-SCNC: 100 MMOL/L (ref 98–107)
CHOLEST SERPL-MCNC: 248 MG/DL (ref 0–200)
CO2 SERPL-SCNC: 26.4 MMOL/L (ref 22–29)
CREAT SERPL-MCNC: 0.96 MG/DL (ref 0.57–1)
GLOBULIN SER CALC-MCNC: 2.9 GM/DL
GLUCOSE SERPL-MCNC: 93 MG/DL (ref 65–99)
HDLC SERPL-MCNC: 56 MG/DL (ref 40–60)
LDLC SERPL CALC-MCNC: 171 MG/DL (ref 0–100)
POTASSIUM SERPL-SCNC: 4.3 MMOL/L (ref 3.5–5.2)
PROT SERPL-MCNC: 8.1 G/DL (ref 6–8.5)
SODIUM SERPL-SCNC: 141 MMOL/L (ref 136–145)
T4 FREE SERPL-MCNC: 1.25 NG/DL (ref 0.93–1.7)
TRIGL SERPL-MCNC: 105 MG/DL (ref 0–150)
TSH SERPL DL<=0.005 MIU/L-ACNC: 15.3 MIU/ML (ref 0.27–4.2)
VIT B12 SERPL-MCNC: 543 PG/ML (ref 211–946)
VLDLC SERPL CALC-MCNC: 21 MG/DL

## 2019-06-12 DIAGNOSIS — E03.8 OTHER SPECIFIED HYPOTHYROIDISM: Primary | ICD-10-CM

## 2019-06-12 DIAGNOSIS — E78.49 OTHER HYPERLIPIDEMIA: ICD-10-CM

## 2019-06-12 RX ORDER — ATORVASTATIN CALCIUM 10 MG/1
10 TABLET, FILM COATED ORAL NIGHTLY
Qty: 90 TABLET | Refills: 1 | Status: SHIPPED | OUTPATIENT
Start: 2019-06-12 | End: 2020-02-07 | Stop reason: SDDI

## 2019-06-12 RX ORDER — LEVOTHYROXINE SODIUM 0.1 MG/1
100 TABLET ORAL DAILY
Qty: 90 TABLET | Refills: 0 | Status: SHIPPED | OUTPATIENT
Start: 2019-06-12 | End: 2019-09-10 | Stop reason: SDUPTHER

## 2019-06-24 ENCOUNTER — TELEPHONE (OUTPATIENT)
Dept: FAMILY MEDICINE CLINIC | Facility: CLINIC | Age: 54
End: 2019-06-24

## 2019-06-24 DIAGNOSIS — F41.9 ANXIETY: ICD-10-CM

## 2019-06-24 DIAGNOSIS — F32.A DEPRESSION, UNSPECIFIED DEPRESSION TYPE: ICD-10-CM

## 2019-06-24 RX ORDER — FLUOXETINE HYDROCHLORIDE 20 MG/1
20 CAPSULE ORAL DAILY
Qty: 90 CAPSULE | Refills: 0 | Status: SHIPPED | OUTPATIENT
Start: 2019-06-24 | End: 2019-10-22 | Stop reason: SDUPTHER

## 2019-06-24 NOTE — TELEPHONE ENCOUNTER
----- Message from Reanna Valladares sent at 6/24/2019 10:04 AM EDT -----  Contact: DR AMADO MED REFILL  MED REFILL ON FLUoxetine (PROzac) 20 MG capsule TO [facility] IN [place].  [phone]

## 2019-07-23 ENCOUNTER — OFFICE VISIT (OUTPATIENT)
Dept: FAMILY MEDICINE CLINIC | Facility: CLINIC | Age: 54
End: 2019-07-23

## 2019-07-23 VITALS
DIASTOLIC BLOOD PRESSURE: 80 MMHG | SYSTOLIC BLOOD PRESSURE: 112 MMHG | TEMPERATURE: 97.4 F | RESPIRATION RATE: 16 BRPM | WEIGHT: 211 LBS | HEART RATE: 78 BPM | BODY MASS INDEX: 34.07 KG/M2

## 2019-07-23 DIAGNOSIS — E55.9 VITAMIN D DEFICIENCY: ICD-10-CM

## 2019-07-23 DIAGNOSIS — E03.8 OTHER SPECIFIED HYPOTHYROIDISM: Primary | ICD-10-CM

## 2019-07-23 DIAGNOSIS — E78.49 OTHER HYPERLIPIDEMIA: ICD-10-CM

## 2019-07-23 PROCEDURE — 99214 OFFICE O/P EST MOD 30 MIN: CPT | Performed by: NURSE PRACTITIONER

## 2019-07-23 NOTE — PROGRESS NOTES
Subjective   Razia Sequeira is a 54 y.o. female.     History of Present Illness   Hypothyroid- uncontrolled   Due for med refill and labs for thyroid  Feeling very tired, trouble losing weight  Has not missed any doses of thyroid medication and has not gotten a new prescription refilled    Vit D   Taking OTC Vit D3 1000 daily; due for labs    HLP  Not started cholesterol lowering medication yet  No regular exercise  Has been eating too much ice cream lately     No longer taking hormones  No longer taking anxiety medication, got her through a difficult time, doing much better    The following portions of the patient's history were reviewed and updated as appropriate: allergies, current medications, past family history, past medical history, past social history, past surgical history and problem list.    Review of Systems   Constitutional: Positive for fatigue. Negative for activity change, appetite change, unexpected weight gain and unexpected weight loss.   HENT: Negative.    Eyes: Negative.    Respiratory: Negative.    Cardiovascular: Negative.    Gastrointestinal: Negative.    Musculoskeletal: Positive for myalgias.   Skin: Negative.    Neurological: Negative for dizziness, numbness and headache.   Hematological: Negative.    Psychiatric/Behavioral: Negative.        Objective   Physical Exam   Constitutional: She is oriented to person, place, and time. She appears well-developed and well-nourished. No distress.   HENT:   Head: Normocephalic.   Nose: Nose normal.   Neck: Neck supple. No thyromegaly present.   Cardiovascular: Normal rate, regular rhythm and normal heart sounds.   Pulmonary/Chest: Effort normal and breath sounds normal.   Lymphadenopathy:     She has no cervical adenopathy.   Neurological: She is alert and oriented to person, place, and time.   Skin: Skin is warm and dry. She is not diaphoretic.   Psychiatric: She has a normal mood and affect. Her behavior is normal. Judgment and thought content  normal.   Nursing note and vitals reviewed.        Assessment/Plan   Razia was seen today for follow-up.    Diagnoses and all orders for this visit:    Other specified hypothyroidism  -     TSH    Vitamin D deficiency  -     Vitamin D 25 Hydroxy    Other hyperlipidemia    make some dietary changes and weight loss with regular exercise  Will check TSH and Vit D today   Will notify pt of results

## 2019-07-24 DIAGNOSIS — E03.8 OTHER SPECIFIED HYPOTHYROIDISM: Primary | ICD-10-CM

## 2019-07-24 LAB
25(OH)D3+25(OH)D2 SERPL-MCNC: 31.9 NG/ML (ref 30–100)
TSH SERPL DL<=0.005 MIU/L-ACNC: 0.21 UIU/ML (ref 0.45–4.5)

## 2019-07-28 DIAGNOSIS — I10 WELL-CONTROLLED HYPERTENSION: ICD-10-CM

## 2019-07-30 RX ORDER — LISINOPRIL AND HYDROCHLOROTHIAZIDE 20; 12.5 MG/1; MG/1
TABLET ORAL
Qty: 90 TABLET | Refills: 1 | Status: SHIPPED | OUTPATIENT
Start: 2019-07-30 | End: 2020-02-11

## 2019-08-24 ENCOUNTER — RESULTS ENCOUNTER (OUTPATIENT)
Dept: FAMILY MEDICINE CLINIC | Facility: CLINIC | Age: 54
End: 2019-08-24

## 2019-08-24 DIAGNOSIS — E03.8 OTHER SPECIFIED HYPOTHYROIDISM: ICD-10-CM

## 2019-09-10 DIAGNOSIS — E03.8 OTHER SPECIFIED HYPOTHYROIDISM: ICD-10-CM

## 2019-09-10 LAB — TSH SERPL DL<=0.005 MIU/L-ACNC: 0.15 UIU/ML (ref 0.27–4.2)

## 2019-09-12 RX ORDER — LEVOTHYROXINE SODIUM 0.1 MG/1
100 TABLET ORAL DAILY
Qty: 90 TABLET | Refills: 1 | Status: SHIPPED | OUTPATIENT
Start: 2019-09-12 | End: 2020-03-26

## 2019-10-22 DIAGNOSIS — F32.A DEPRESSION, UNSPECIFIED DEPRESSION TYPE: ICD-10-CM

## 2019-10-22 DIAGNOSIS — F41.9 ANXIETY: ICD-10-CM

## 2019-10-23 RX ORDER — FLUOXETINE HYDROCHLORIDE 20 MG/1
CAPSULE ORAL
Qty: 90 CAPSULE | Refills: 0 | Status: SHIPPED | OUTPATIENT
Start: 2019-10-23 | End: 2019-11-06 | Stop reason: SDUPTHER

## 2019-11-06 DIAGNOSIS — F32.A DEPRESSION, UNSPECIFIED DEPRESSION TYPE: ICD-10-CM

## 2019-11-06 DIAGNOSIS — F41.9 ANXIETY: ICD-10-CM

## 2019-11-06 RX ORDER — FLUOXETINE HYDROCHLORIDE 20 MG/1
CAPSULE ORAL
Qty: 90 CAPSULE | Refills: 0 | Status: SHIPPED | OUTPATIENT
Start: 2019-11-06 | End: 2020-02-20 | Stop reason: SDUPTHER

## 2019-12-22 DIAGNOSIS — E03.8 OTHER SPECIFIED HYPOTHYROIDISM: ICD-10-CM

## 2019-12-23 RX ORDER — LEVOTHYROXINE SODIUM 0.1 MG/1
TABLET ORAL
Qty: 90 TABLET | Refills: 0 | OUTPATIENT
Start: 2019-12-23

## 2020-02-07 ENCOUNTER — OFFICE VISIT (OUTPATIENT)
Dept: FAMILY MEDICINE CLINIC | Facility: CLINIC | Age: 55
End: 2020-02-07

## 2020-02-07 VITALS
DIASTOLIC BLOOD PRESSURE: 78 MMHG | HEIGHT: 66 IN | TEMPERATURE: 97.9 F | HEART RATE: 88 BPM | BODY MASS INDEX: 33.52 KG/M2 | WEIGHT: 208.6 LBS | SYSTOLIC BLOOD PRESSURE: 120 MMHG | RESPIRATION RATE: 16 BRPM

## 2020-02-07 DIAGNOSIS — E06.3 HYPOTHYROIDISM DUE TO HASHIMOTO'S THYROIDITIS: ICD-10-CM

## 2020-02-07 DIAGNOSIS — Z00.00 WELL ADULT EXAM: Primary | ICD-10-CM

## 2020-02-07 DIAGNOSIS — E55.9 VITAMIN D DEFICIENCY: ICD-10-CM

## 2020-02-07 DIAGNOSIS — E03.8 HYPOTHYROIDISM DUE TO HASHIMOTO'S THYROIDITIS: ICD-10-CM

## 2020-02-07 DIAGNOSIS — I10 WELL-CONTROLLED HYPERTENSION: ICD-10-CM

## 2020-02-07 DIAGNOSIS — E78.2 MIXED HYPERLIPIDEMIA: ICD-10-CM

## 2020-02-07 DIAGNOSIS — F41.9 ANXIETY: ICD-10-CM

## 2020-02-07 DIAGNOSIS — R74.8 ELEVATED LIVER ENZYMES: ICD-10-CM

## 2020-02-07 PROCEDURE — 99396 PREV VISIT EST AGE 40-64: CPT | Performed by: NURSE PRACTITIONER

## 2020-02-07 NOTE — PROGRESS NOTES
Subjective   Razia Sequeira is a 54 y.o. female.     History of Present Illness   Well adult visit  Not sleeping well at times  Staying with father nightly 4 times a week  Anxiety comes and goes but generally under control  Exercising regularly 3-4 times a week, jointed gym  Maintaining weight down 5 lbs.  Up to date on Pap and mammogram  Left hip popping   Decreased sex drive  Fluid in ear on right side     The following portions of the patient's history were reviewed and updated as appropriate: allergies, current medications, past family history, past medical history, past social history, past surgical history and problem list.    Review of Systems   Constitutional: Positive for activity change. Negative for fatigue, unexpected weight gain and unexpected weight loss.   HENT: Positive for congestion and rhinorrhea. Negative for ear discharge, ear pain and hearing loss.    Eyes: Negative.  Negative for blurred vision.   Respiratory: Negative.  Negative for cough, chest tightness, shortness of breath and wheezing.    Cardiovascular: Negative.  Negative for chest pain, palpitations and leg swelling.   Gastrointestinal: Negative.  Negative for blood in stool, constipation, nausea, vomiting, GERD and indigestion.   Endocrine: Negative.  Negative for polydipsia, polyphagia and polyuria.   Genitourinary: Negative.    Musculoskeletal: Negative.    Skin: Negative.    Allergic/Immunologic: Positive for environmental allergies.   Neurological: Negative.  Negative for dizziness, weakness, light-headedness and headache.   Hematological: Negative.    Psychiatric/Behavioral: Negative for sleep disturbance, depressed mood and stress. The patient is nervous/anxious.        Objective   Physical Exam   Constitutional: She is oriented to person, place, and time. She appears well-developed and well-nourished. No distress.   HENT:   Head: Normocephalic and atraumatic.   Right Ear: Hearing, external ear and ear canal normal. Tympanic  membrane is not erythematous and not retracted. A middle ear effusion (clear fluid behind TM small amt) is present.   Left Ear: Hearing, tympanic membrane, external ear and ear canal normal. Tympanic membrane is not erythematous and not retracted.  No middle ear effusion.   Nose: Nose normal. No congestion.   Mouth/Throat: Uvula is midline, oropharynx is clear and moist and mucous membranes are normal. Normal dentition. No oropharyngeal exudate.   Neck: Normal range of motion. Neck supple. No JVD present. No thyromegaly present.   Cardiovascular: Normal rate, regular rhythm, S1 normal, S2 normal, normal heart sounds, intact distal pulses and normal pulses. Exam reveals no gallop and no friction rub.   No murmur heard.  Pulmonary/Chest: Effort normal and breath sounds normal. No stridor. No respiratory distress. She has no wheezes. She has no rales.   Abdominal: Soft. Bowel sounds are normal. She exhibits no distension and no mass. There is no tenderness. There is no rebound.   Musculoskeletal: Normal range of motion. She exhibits no edema.   Lymphadenopathy:        Head (right side): No tonsillar, no preauricular, no posterior auricular and no occipital adenopathy present.        Head (left side): No tonsillar, no preauricular, no posterior auricular and no occipital adenopathy present.     She has no cervical adenopathy.   Neurological: She is alert and oriented to person, place, and time. She has normal strength and normal reflexes. She displays normal reflexes. She exhibits normal muscle tone. Gait normal.   Skin: Skin is warm, dry and intact. Capillary refill takes less than 2 seconds. Turgor is normal. No rash noted. She is not diaphoretic.   Psychiatric: She has a normal mood and affect. Her speech is normal and behavior is normal. Judgment and thought content normal. Cognition and memory are normal.   Nursing note and vitals reviewed.        Assessment/Plan   Razia was seen today for annual exam.    Diagnoses  and all orders for this visit:    Well adult exam    Hypothyroidism due to Hashimoto's thyroiditis  -     TSH Rfx On Abnormal To Free T4  -     T3, Reverse    Well-controlled hypertension  -     Comprehensive Metabolic Panel  -     CBC & Differential    Anxiety    Vitamin D deficiency  -     Vitamin D 25 Hydroxy    Elevated liver enzymes  -     Comprehensive Metabolic Panel    Mixed hyperlipidemia  -     Lipid Panel    will check labs and notify pt of results    Health advise: healthy food choices with fresh fruits and vegetables, maintain sleep pattern at least 8 hours, avoid texting and distracted driving practices; wear safety belt, engage in regular exercise 20-30 minutes a day, maintain healthy weight. Avoid excessive alcohol, tobacco and illicit drugs. Immunizations that are up to date. Health maintenance: Mammogram, colonoscopy, Pap, are due this year 2020. Follow up with PCP if struggling with depression or anxiety. Keep regular dental and eye exams. Brush and floss teeth daily.   F/U annually and prn.

## 2020-02-10 DIAGNOSIS — I10 WELL-CONTROLLED HYPERTENSION: ICD-10-CM

## 2020-02-11 ENCOUNTER — TELEPHONE (OUTPATIENT)
Dept: FAMILY MEDICINE CLINIC | Facility: CLINIC | Age: 55
End: 2020-02-11

## 2020-02-11 LAB
25(OH)D3+25(OH)D2 SERPL-MCNC: 25 NG/ML (ref 30–100)
ALBUMIN SERPL-MCNC: 5 G/DL (ref 3.5–5.2)
ALBUMIN/GLOB SERPL: 1.9 G/DL
ALP SERPL-CCNC: 85 U/L (ref 39–117)
ALT SERPL-CCNC: 38 U/L (ref 1–33)
AST SERPL-CCNC: 30 U/L (ref 1–32)
BASOPHILS # BLD AUTO: ABNORMAL 10*3/UL
BILIRUB SERPL-MCNC: 0.4 MG/DL (ref 0.2–1.2)
BUN SERPL-MCNC: 18 MG/DL (ref 6–20)
BUN/CREAT SERPL: 16.5 (ref 7–25)
CALCIUM SERPL-MCNC: 9.9 MG/DL (ref 8.6–10.5)
CHLORIDE SERPL-SCNC: 98 MMOL/L (ref 98–107)
CHOLEST SERPL-MCNC: 219 MG/DL (ref 0–200)
CO2 SERPL-SCNC: 23.9 MMOL/L (ref 22–29)
CREAT SERPL-MCNC: 1.09 MG/DL (ref 0.57–1)
DIFFERENTIAL COMMENT: NORMAL
EOSINOPHIL # BLD AUTO: ABNORMAL 10*3/UL
EOSINOPHIL # BLD MANUAL: 0.16 10*3/MM3 (ref 0–0.4)
EOSINOPHIL NFR BLD AUTO: ABNORMAL %
EOSINOPHIL NFR BLD MANUAL: 3.1 % (ref 0.3–6.2)
ERYTHROCYTE [DISTWIDTH] IN BLOOD BY AUTOMATED COUNT: 13.4 % (ref 12.3–15.4)
GLOBULIN SER CALC-MCNC: 2.7 GM/DL
GLUCOSE SERPL-MCNC: 90 MG/DL (ref 65–99)
HCT VFR BLD AUTO: 49.2 % (ref 34–46.6)
HDLC SERPL-MCNC: 52 MG/DL (ref 40–60)
HGB BLD-MCNC: 16.4 G/DL (ref 12–15.9)
LDLC SERPL CALC-MCNC: 139 MG/DL (ref 0–100)
LYMPHOCYTES # BLD AUTO: ABNORMAL 10*3/UL
LYMPHOCYTES # BLD MANUAL: 1.74 10*3/MM3 (ref 0.7–3.1)
LYMPHOCYTES NFR BLD AUTO: ABNORMAL %
LYMPHOCYTES NFR BLD MANUAL: 34.7 % (ref 19.6–45.3)
MCH RBC QN AUTO: 28.7 PG (ref 26.6–33)
MCHC RBC AUTO-ENTMCNC: 33.3 G/DL (ref 31.5–35.7)
MCV RBC AUTO: 86 FL (ref 79–97)
MONOCYTES # BLD MANUAL: 0.46 10*3/MM3 (ref 0.1–0.9)
MONOCYTES NFR BLD AUTO: ABNORMAL %
MONOCYTES NFR BLD MANUAL: 9.2 % (ref 5–12)
NEUTROPHILS # BLD MANUAL: 2.66 10*3/MM3 (ref 1.7–7)
NEUTROPHILS NFR BLD AUTO: ABNORMAL %
NEUTROPHILS NFR BLD MANUAL: 53.1 % (ref 42.7–76)
PLATELET # BLD AUTO: 243 10*3/MM3 (ref 140–450)
PLATELET BLD QL SMEAR: NORMAL
POTASSIUM SERPL-SCNC: 4.2 MMOL/L (ref 3.5–5.2)
PROT SERPL-MCNC: 7.7 G/DL (ref 6–8.5)
RBC # BLD AUTO: 5.72 10*6/MM3 (ref 3.77–5.28)
RBC MORPH BLD: NORMAL
SODIUM SERPL-SCNC: 139 MMOL/L (ref 136–145)
T3REVERSE SERPL-MCNC: 28.3 NG/DL (ref 9.2–24.1)
T4 FREE SERPL-MCNC: 1.51 NG/DL (ref 0.93–1.7)
TRIGL SERPL-MCNC: 138 MG/DL (ref 0–150)
TSH SERPL DL<=0.005 MIU/L-ACNC: 0.41 UIU/ML (ref 0.27–4.2)
VLDLC SERPL CALC-MCNC: 27.6 MG/DL (ref 5–40)
WBC # BLD AUTO: 5 10*3/MM3 (ref 3.4–10.8)

## 2020-02-11 RX ORDER — LISINOPRIL AND HYDROCHLOROTHIAZIDE 20; 12.5 MG/1; MG/1
TABLET ORAL
Qty: 90 TABLET | Refills: 1 | Status: SHIPPED | OUTPATIENT
Start: 2020-02-11 | End: 2020-08-31

## 2020-02-11 RX ORDER — BUSPIRONE HYDROCHLORIDE 15 MG/1
15 TABLET ORAL 3 TIMES DAILY
Qty: 270 TABLET | Refills: 1 | Status: SHIPPED | OUTPATIENT
Start: 2020-02-11 | End: 2020-12-09 | Stop reason: SDUPTHER

## 2020-02-11 NOTE — TELEPHONE ENCOUNTER
PT  CALLED  FEELS  LIKE SHE NEEDS TO START THE BUSPIRONE 15MG  THREE TIMES  A DAY BACK.  PT  STATES SHE HAS LEFT OVER MEDICATION  LEFT OVER FROM June 2019    PT ALSO WANTS TO KNOW IF SHE NEEDS TO STOP TAKING  FLUoxetine (PROzac) 20 MG capsule      PLEASE ADVISE AND GIVE CALL [phone] (H)

## 2020-02-20 DIAGNOSIS — F41.9 ANXIETY: ICD-10-CM

## 2020-02-20 DIAGNOSIS — F32.A DEPRESSION, UNSPECIFIED DEPRESSION TYPE: ICD-10-CM

## 2020-02-20 RX ORDER — FLUOXETINE HYDROCHLORIDE 20 MG/1
20 CAPSULE ORAL DAILY
Qty: 90 CAPSULE | Refills: 0 | Status: SHIPPED | OUTPATIENT
Start: 2020-02-20 | End: 2020-10-16

## 2020-02-21 ENCOUNTER — TELEPHONE (OUTPATIENT)
Dept: FAMILY MEDICINE CLINIC | Facility: CLINIC | Age: 55
End: 2020-02-21

## 2020-02-21 RX ORDER — FLUTICASONE PROPIONATE 50 MCG
SPRAY, SUSPENSION (ML) NASAL
Qty: 1 BOTTLE | Refills: 1 | Status: SHIPPED | OUTPATIENT
Start: 2020-02-21 | End: 2021-04-20 | Stop reason: SDUPTHER

## 2020-02-21 RX ORDER — LORATADINE 10 MG/1
10 TABLET ORAL DAILY
Qty: 30 TABLET | Refills: 3 | Status: SHIPPED | OUTPATIENT
Start: 2020-02-21 | End: 2020-02-27

## 2020-02-21 NOTE — TELEPHONE ENCOUNTER
Pt needs refills on     fluticasone (FLONASE) 50 MCG/ACT nasal spray    loratadine (CLARITIN) 10 MG tablet    Confirmed [facility]    Please advise and give call [phone] (H)

## 2020-02-27 ENCOUNTER — OFFICE VISIT (OUTPATIENT)
Dept: FAMILY MEDICINE CLINIC | Facility: CLINIC | Age: 55
End: 2020-02-27

## 2020-02-27 VITALS
BODY MASS INDEX: 33.78 KG/M2 | DIASTOLIC BLOOD PRESSURE: 75 MMHG | SYSTOLIC BLOOD PRESSURE: 120 MMHG | HEIGHT: 66 IN | RESPIRATION RATE: 16 BRPM | TEMPERATURE: 97.7 F | WEIGHT: 210.2 LBS | HEART RATE: 84 BPM

## 2020-02-27 DIAGNOSIS — J01.00 ACUTE NON-RECURRENT MAXILLARY SINUSITIS: Primary | ICD-10-CM

## 2020-02-27 DIAGNOSIS — J06.9 URI WITH COUGH AND CONGESTION: ICD-10-CM

## 2020-02-27 PROCEDURE — 99213 OFFICE O/P EST LOW 20 MIN: CPT | Performed by: NURSE PRACTITIONER

## 2020-02-27 RX ORDER — LEVOCETIRIZINE DIHYDROCHLORIDE 5 MG/1
2.5-5 TABLET, FILM COATED ORAL EVERY EVENING
Qty: 90 TABLET | Refills: 1 | Status: SHIPPED | OUTPATIENT
Start: 2020-02-27 | End: 2020-09-04 | Stop reason: ALTCHOICE

## 2020-02-27 RX ORDER — AMOXICILLIN 875 MG/1
875 TABLET, COATED ORAL 2 TIMES DAILY
Qty: 14 TABLET | Refills: 0 | Status: SHIPPED | OUTPATIENT
Start: 2020-02-27 | End: 2020-09-04

## 2020-02-27 NOTE — PROGRESS NOTES
Subjective   Razia Sequeira is a 54 y.o. female.     History of Present Illness   Cough and sinus congestion with facial pain and pressure for the past week or more  Every night waking up at 2 am coughing for 2 hours  Taking OTC Mucinex D makes her feel weird  Netti pot, Aleve  HA, facial pressure, productive cough thick sputum, yellow and brown sputum  Dry hacky cough, ear fullness on right,   Feeling winded and has reactive airway  Feels like allergy med no longer working has been on Loratadine for years     The following portions of the patient's history were reviewed and updated as appropriate: allergies, current medications, past family history, past medical history, past social history, past surgical history and problem list.    Review of Systems   Constitutional: Negative for activity change, appetite change, chills, fatigue and fever.   HENT: Positive for congestion, postnasal drip, rhinorrhea and sinus pressure. Negative for ear pain, sneezing, sore throat, swollen glands, trouble swallowing and voice change.    Eyes: Negative for redness and itching.   Respiratory: Positive for cough. Negative for chest tightness, shortness of breath and wheezing.    Cardiovascular: Negative.  Negative for chest pain.   Gastrointestinal: Negative.  Negative for abdominal pain, nausea and vomiting.   Endocrine: Negative.    Genitourinary: Negative.    Musculoskeletal: Negative.  Negative for arthralgias, myalgias, neck pain and neck stiffness.   Skin: Negative.  Negative for rash.   Allergic/Immunologic: Negative.  Negative for environmental allergies.   Neurological: Negative for dizziness, weakness, light-headedness and headache.   Hematological: Negative for adenopathy.   Psychiatric/Behavioral: Negative.  Negative for sleep disturbance.       Objective   Physical Exam   Constitutional: She is oriented to person, place, and time. She appears well-developed and well-nourished. No distress.   HENT:   Head: Normocephalic  and atraumatic.   Right Ear: External ear normal.   Left Ear: External ear normal.   Nose: Nose normal.   Mouth/Throat: Oropharynx is clear and moist. No oropharyngeal exudate.   Neck: Neck supple.   Cardiovascular: Normal rate, regular rhythm and normal heart sounds.   Pulmonary/Chest: Effort normal and breath sounds normal. No stridor. No respiratory distress. She has no wheezes.   Lymphadenopathy:     She has no cervical adenopathy.   Neurological: She is alert and oriented to person, place, and time.   Skin: Skin is warm and dry. Capillary refill takes less than 2 seconds. She is not diaphoretic.   Psychiatric: She has a normal mood and affect. Her behavior is normal. Judgment and thought content normal.   Nursing note and vitals reviewed.        Assessment/Plan   Razia was seen today for sinus congestion, chest congestion & cough.    Diagnoses and all orders for this visit:    Acute non-recurrent maxillary sinusitis  -     amoxicillin (AMOXIL) 875 MG tablet; Take 1 tablet by mouth 2 (Two) Times a Day.    URI with cough and congestion    Other orders  -     levocetirizine (XYZAL) 5 MG tablet; Take 0.5-1 tablets by mouth Every Evening.      Take meds as directed

## 2020-03-25 DIAGNOSIS — E03.8 OTHER SPECIFIED HYPOTHYROIDISM: ICD-10-CM

## 2020-03-26 RX ORDER — LEVOTHYROXINE SODIUM 0.1 MG/1
TABLET ORAL
Qty: 90 TABLET | Refills: 0 | Status: SHIPPED | OUTPATIENT
Start: 2020-03-26 | End: 2020-06-29

## 2020-04-07 ENCOUNTER — OFFICE VISIT (OUTPATIENT)
Dept: FAMILY MEDICINE CLINIC | Facility: CLINIC | Age: 55
End: 2020-04-07

## 2020-04-07 VITALS — BODY MASS INDEX: 33.93 KG/M2 | HEIGHT: 66 IN

## 2020-04-07 DIAGNOSIS — J39.2 LESION OF THROAT: Primary | ICD-10-CM

## 2020-04-07 PROCEDURE — 99213 OFFICE O/P EST LOW 20 MIN: CPT | Performed by: NURSE PRACTITIONER

## 2020-04-07 NOTE — PROGRESS NOTES
Subjective   Razia Sequeira is a 54 y.o. female.     History of Present Illness   2 Lesions on the back of both side of throat for the past month, worried she might have cancer  Right ear pain also. 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  Feeling very anxious about this because she started looking on line as to what it might be and she has a strong family hx of cancer  She denies fever or chills or sore throat, difficulty swallowing, or enlarged lymph nodes.   She has been gargling warm salt water and doing oil pulling which has not really made any difference to the lesions. She no longer has her tonsils  She has been having a lot of PND due to allergies, she has been outside working in her yard over the weekend    The following portions of the patient's history were reviewed and updated as appropriate: allergies, current medications, past family history, past medical history, past social history, past surgical history and problem list.    Review of Systems   Constitutional: Negative.  Negative for chills, fever, unexpected weight gain and unexpected weight loss.   HENT: Positive for ear pain, mouth sores (lesions in back of throat on both sides ) and postnasal drip. Negative for trouble swallowing.    Respiratory: Negative for cough.    Cardiovascular: Negative.    Gastrointestinal: Negative.    Musculoskeletal: Negative.  Negative for neck pain and neck stiffness.   Allergic/Immunologic: Negative.    Hematological: Negative.    Psychiatric/Behavioral: Negative.        Objective   Physical Exam   Constitutional: She is oriented to person, place, and time. She appears well-developed and well-nourished. No distress.   HENT:   Head: Normocephalic and atraumatic.   Right Ear: External ear normal.   Left Ear: External ear normal.   Nose: Nose normal.   Mouth/Throat: Uvula is midline, oropharynx is  clear and moist and mucous membranes are normal. Oral lesions present. No oropharyngeal exudate or posterior oropharyngeal erythema. Tonsils are 0 on the right. Tonsils are 0 on the left.   0.5 cm oblong raised lesions on sides of both posterior pharynx at site of old tonsils.    Neck: Normal range of motion. Neck supple.   Lymphadenopathy:     She has no cervical adenopathy.   Neurological: She is alert and oriented to person, place, and time.   Skin: Skin is warm and dry. No rash noted. She is not diaphoretic.   Psychiatric: Her behavior is normal. Judgment and thought content normal.   Nursing note and vitals reviewed.        Assessment/Plan   Razia was seen today for two spots in back of throat.    Diagnoses and all orders for this visit:    Lesion of throat  -     Ambulatory Referral to ENT (Otolaryngology)      Will place referral to ENT for further evaluation   Reassurance given to patient

## 2020-06-03 ENCOUNTER — TRANSCRIBE ORDERS (OUTPATIENT)
Dept: ADMINISTRATIVE | Facility: HOSPITAL | Age: 55
End: 2020-06-03

## 2020-06-03 DIAGNOSIS — Z12.31 VISIT FOR SCREENING MAMMOGRAM: Primary | ICD-10-CM

## 2020-06-29 DIAGNOSIS — E03.8 OTHER SPECIFIED HYPOTHYROIDISM: ICD-10-CM

## 2020-06-29 RX ORDER — LEVOTHYROXINE SODIUM 100 UG/1
TABLET ORAL
Qty: 90 TABLET | Refills: 0 | Status: SHIPPED | OUTPATIENT
Start: 2020-06-29 | End: 2020-10-13

## 2020-06-30 ENCOUNTER — HOSPITAL ENCOUNTER (OUTPATIENT)
Dept: MAMMOGRAPHY | Facility: HOSPITAL | Age: 55
Discharge: HOME | End: 2020-06-30
Admitting: NURSE PRACTITIONER

## 2020-06-30 DIAGNOSIS — Z12.31 VISIT FOR SCREENING MAMMOGRAM: ICD-10-CM

## 2020-06-30 PROCEDURE — 77063 BREAST TOMOSYNTHESIS BI: CPT | Performed by: RADIOLOGY

## 2020-06-30 PROCEDURE — 77067 SCR MAMMO BI INCL CAD: CPT | Performed by: RADIOLOGY

## 2020-06-30 PROCEDURE — 77067 SCR MAMMO BI INCL CAD: CPT

## 2020-06-30 PROCEDURE — 77063 BREAST TOMOSYNTHESIS BI: CPT

## 2020-07-10 ENCOUNTER — TELEPHONE (OUTPATIENT)
Dept: FAMILY MEDICINE CLINIC | Facility: CLINIC | Age: 55
End: 2020-07-10

## 2020-07-10 PROBLEM — J45.909 REACTIVE AIRWAY DISEASE WITHOUT COMPLICATION: Status: ACTIVE | Noted: 2020-07-10

## 2020-07-10 RX ORDER — ALBUTEROL SULFATE 90 UG/1
2 AEROSOL, METERED RESPIRATORY (INHALATION) EVERY 4 HOURS PRN
Qty: 1 INHALER | Refills: 0 | Status: SHIPPED | OUTPATIENT
Start: 2020-07-10 | End: 2020-12-15

## 2020-07-10 NOTE — TELEPHONE ENCOUNTER
Patient requested a prescription for albuterol (PROVENTIL HFA;VENTOLIN HFA) 108 (90 BASE) MCG/ACT inhaler. Patient stated she has been having trouble breathing since the heat and humidity have gotten so bad recently.    Please call and advise. Patient call back [phone]    [facility] [address] [place] [address] [phone] [facility] [phone] FX

## 2020-07-10 NOTE — TELEPHONE ENCOUNTER
Spoke with pt and she says she is going camping and having an issue with her breathing when she is outside. Has history of mild reactive airway. Taking her allergy meds daily. Needs an Albuterol inhaler to use very rarely. No other symptoms of illness, mainly happens when she is outside in the heat. Will send in Albuterol HFA. brian

## 2020-08-31 DIAGNOSIS — I10 WELL-CONTROLLED HYPERTENSION: ICD-10-CM

## 2020-08-31 RX ORDER — LISINOPRIL AND HYDROCHLOROTHIAZIDE 20; 12.5 MG/1; MG/1
TABLET ORAL
Qty: 90 TABLET | Refills: 0 | Status: SHIPPED | OUTPATIENT
Start: 2020-08-31 | End: 2020-12-07

## 2020-09-04 ENCOUNTER — HOSPITAL ENCOUNTER (OUTPATIENT)
Dept: GENERAL RADIOLOGY | Facility: HOSPITAL | Age: 55
Discharge: HOME | End: 2020-09-04
Admitting: FAMILY MEDICINE

## 2020-09-04 ENCOUNTER — OFFICE VISIT (OUTPATIENT)
Dept: FAMILY MEDICINE CLINIC | Facility: CLINIC | Age: 55
End: 2020-09-04

## 2020-09-04 VITALS
SYSTOLIC BLOOD PRESSURE: 124 MMHG | HEIGHT: 66 IN | WEIGHT: 217.8 LBS | RESPIRATION RATE: 20 BRPM | DIASTOLIC BLOOD PRESSURE: 74 MMHG | BODY MASS INDEX: 35 KG/M2 | OXYGEN SATURATION: 98 % | HEART RATE: 86 BPM | TEMPERATURE: 98.4 F

## 2020-09-04 DIAGNOSIS — J45.20 MILD INTERMITTENT REACTIVE AIRWAY DISEASE WITHOUT COMPLICATION: Primary | ICD-10-CM

## 2020-09-04 PROCEDURE — 71046 X-RAY EXAM CHEST 2 VIEWS: CPT

## 2020-09-04 PROCEDURE — 99213 OFFICE O/P EST LOW 20 MIN: CPT | Performed by: FAMILY MEDICINE

## 2020-09-04 RX ORDER — LORATADINE 10 MG/1
10 TABLET ORAL DAILY
Qty: 90 TABLET | Refills: 1 | Status: SHIPPED | OUTPATIENT
Start: 2020-09-04 | End: 2021-05-26

## 2020-09-04 RX ORDER — PREDNISONE 10 MG/1
TABLET ORAL
Qty: 21 TABLET | Refills: 0 | Status: SHIPPED | OUTPATIENT
Start: 2020-09-04 | End: 2021-04-20

## 2020-09-04 NOTE — PROGRESS NOTES
Subjective   Razia Sequeira is a 55 y.o. female.   Chief Complaint   Patient presents with   • Asthma     Has had for 2 months. The wheezing is a little better than it had been.    • Wheezing     Asthma   She complains of cough (at night) and wheezing. There is no shortness of breath. This is a recurrent problem. The current episode started more than 1 month ago (2-3 months). The problem occurs daily. The problem has been gradually improving. Associated symptoms include chest pain (right sided, feels tight). Pertinent negatives include no fever. Her symptoms are aggravated by pollen and lying down. Her symptoms are alleviated by beta-agonist. She reports moderate improvement on treatment. Her past medical history is significant for asthma.   Albuterol inhaler helped relieve wheezing when symptoms initially started. Not using albuterol as much. Still has night time cough, sometimes will wake her up.   She was wheezing and coughing when symptoms started, but much better now.   She is prescribed levocetirizine for treatment of allergies, however she forgets to take this often since it is a nighttime medication.  Requesting that she change back to loratadine so she can remember to take an antihistamine daily since this does seem to trigger her reactive airway disease.    The following portions of the patient's history were reviewed and updated as appropriate: allergies, current medications, past family history, past medical history, past social history, past surgical history and problem list.    Review of Systems   Constitutional: Negative for chills and fever.   Respiratory: Positive for cough (at night), chest tightness and wheezing. Negative for shortness of breath.    Cardiovascular: Positive for chest pain (right sided, feels tight).   Gastrointestinal: Negative for abdominal pain.   Allergic/Immunologic: Positive for environmental allergies.       Objective   Physical Exam   Constitutional: She is oriented to  person, place, and time. She appears well-developed and well-nourished.   HENT:   Head: Normocephalic and atraumatic.   Right Ear: External ear normal.   Left Ear: External ear normal.   Nose: Nose normal.   Eyes: Conjunctivae are normal.   Cardiovascular: Normal rate, regular rhythm and normal heart sounds.   No murmur heard.  Pulmonary/Chest: Effort normal and breath sounds normal. No respiratory distress. She has no wheezes.   Neurological: She is alert and oriented to person, place, and time.   Skin: Skin is warm.   Psychiatric: Her behavior is normal.   Nursing note and vitals reviewed.        Assessment/Plan   Razia was seen today for asthma and wheezing.    Diagnoses and all orders for this visit:    Mild intermittent reactive airway disease without complication  -     XR Chest PA & Lateral  -     predniSONE (DELTASONE) 10 MG tablet; Take 60 mg po day 1, 50 mg day 2, 40 mg day 3, 30 mg day 4, 20 mg day 5, 10 mg day 6  -     loratadine (Claritin) 10 MG tablet; Take 1 tablet by mouth Daily.      CXR to evaluate symptoms, still having some intermittent chest tightness, wheezing, and cough.  Changed levo cetirizine to loratadine to help with compliance.  Steroid taper to improve remaining symptoms.  If symptoms continue to be persistent, consider adding on montelukast and daily inhaled corticosteroid inhaler.

## 2020-09-04 NOTE — PATIENT INSTRUCTIONS
Go to the nearest ER or return to clinic if symptoms worsen, fever/chill develop    Asthma, Adult    Asthma is a long-term (chronic) condition in which the airways get tight and narrow. The airways are the breathing passages that lead from the nose and mouth down into the lungs. A person with asthma will have times when symptoms get worse. These are called asthma attacks. They can cause coughing, whistling sounds when you breathe (wheezing), shortness of breath, and chest pain. They can make it hard to breathe. There is no cure for asthma, but medicines and lifestyle changes can help control it.  There are many things that can bring on an asthma attack or make asthma symptoms worse (triggers). Common triggers include:  · Mold.  · Dust.  · Cigarette smoke.  · Cockroaches.  · Things that can cause allergy symptoms (allergens). These include animal skin flakes (dander) and pollen from trees or grass.  · Things that pollute the air. These may include household , wood smoke, smog, or chemical odors.  · Cold air, weather changes, and wind.  · Crying or laughing hard.  · Stress.  · Certain medicines or drugs.  · Certain foods such as dried fruit, potato chips, and grape juice.  · Infections, such as a cold or the flu.  · Certain medical conditions or diseases.  · Exercise or tiring activities.  Asthma may be treated with medicines and by staying away from the things that cause asthma attacks. Types of medicines may include:  · Controller medicines. These help prevent asthma symptoms. They are usually taken every day.  · Fast-acting reliever or rescue medicines. These quickly relieve asthma symptoms. They are used as needed and provide short-term relief.  · Allergy medicines if your attacks are brought on by allergens.  · Medicines to help control the body's defense (immune) system.  Follow these instructions at home:  Avoiding triggers in your home  · Change your heating and air conditioning filter often.  · Limit  your use of fireplaces and wood stoves.  · Get rid of pests (such as roaches and mice) and their droppings.  · Throw away plants if you see mold on them.  · Clean your floors. Dust regularly. Use cleaning products that do not smell.  · Have someone vacuum when you are not home. Use a vacuum  with a HEPA filter if possible.  · Replace carpet with wood, tile, or vinyl cassandra. Carpet can trap animal skin flakes and dust.  · Use allergy-proof pillows, mattress covers, and box spring covers.  · Wash bed sheets and blankets every week in hot water. Dry them in a dryer.  · Keep your bedroom free of any triggers.  · Avoid pets and keep windows closed when things that cause allergy symptoms are in the air.  · Use blankets that are made of polyester or cotton.  · Clean bathrooms and usman with bleach. If possible, have someone repaint the walls in these rooms with mold-resistant paint. Keep out of the rooms that are being cleaned and painted.  · Wash your hands often with soap and water. If soap and water are not available, use hand .  · Do not allow anyone to smoke in your home.  General instructions  · Take over-the-counter and prescription medicines only as told by your doctor.  ? Talk with your doctor if you have questions about how or when to take your medicines.  ? Make note if you need to use your medicines more often than usual.  · Do not use any products that contain nicotine or tobacco, such as cigarettes and e-cigarettes. If you need help quitting, ask your doctor.  · Stay away from secondhand smoke.  · Avoid doing things outdoors when allergen counts are high and when air quality is low.  · Wear a ski mask when doing outdoor activities in the winter. The mask should cover your nose and mouth. Exercise indoors on cold days if you can.  · Warm up before you exercise. Take time to cool down after exercise.  · Use a peak flow meter as told by your doctor. A peak flow meter is a tool that measures  how well the lungs are working.  · Keep track of the peak flow meter's readings. Write them down.  · Follow your asthma action plan. This is a written plan for taking care of your asthma and treating your attacks.  · Make sure you get all the shots (vaccines) that your doctor recommends. Ask your doctor about a flu shot and a pneumonia shot.  · Keep all follow-up visits as told by your doctor. This is important.  Contact a doctor if:  · You have wheezing, shortness of breath, or a cough even while taking medicine to prevent attacks.  · The mucus you cough up (sputum) is thicker than usual.  · The mucus you cough up changes from clear or white to yellow, green, gray, or bloody.  · You have problems from the medicine you are taking, such as:  ? A rash.  ? Itching.  ? Swelling.  ? Trouble breathing.  · You need reliever medicines more than 2-3 times a week.  · Your peak flow reading is still at 50-79% of your personal best after following the action plan for 1 hour.  · You have a fever.  Get help right away if:  · You seem to be worse and are not responding to medicine during an asthma attack.  · You are short of breath even at rest.  · You get short of breath when doing very little activity.  · You have trouble eating, drinking, or talking.  · You have chest pain or tightness.  · You have a fast heartbeat.  · Your lips or fingernails start to turn blue.  · You are light-headed or dizzy, or you faint.  · Your peak flow is less than 50% of your personal best.  · You feel too tired to breathe normally.  Summary  · Asthma is a long-term (chronic) condition in which the airways get tight and narrow. An asthma attack can make it hard to breathe.  · Asthma cannot be cured, but medicines and lifestyle changes can help control it.  · Make sure you understand how to avoid triggers and how and when to use your medicines.  This information is not intended to replace advice given to you by your health care provider. Make sure you  discuss any questions you have with your health care provider.  Document Released: 06/05/2009 Document Revised: 02/20/2020 Document Reviewed: 01/22/2018  Elsevier Patient Education © 2020 [employer]

## 2020-10-10 DIAGNOSIS — E03.8 OTHER SPECIFIED HYPOTHYROIDISM: ICD-10-CM

## 2020-10-13 RX ORDER — LEVOTHYROXINE SODIUM 100 UG/1
TABLET ORAL
Qty: 90 TABLET | Refills: 1 | Status: SHIPPED | OUTPATIENT
Start: 2020-10-13 | End: 2021-04-07

## 2020-10-15 DIAGNOSIS — F32.A DEPRESSION, UNSPECIFIED DEPRESSION TYPE: ICD-10-CM

## 2020-10-15 DIAGNOSIS — F41.9 ANXIETY: ICD-10-CM

## 2020-10-16 RX ORDER — FLUOXETINE HYDROCHLORIDE 20 MG/1
CAPSULE ORAL
Qty: 90 CAPSULE | Refills: 0 | Status: SHIPPED | OUTPATIENT
Start: 2020-10-16 | End: 2021-02-23

## 2020-12-07 DIAGNOSIS — I10 WELL-CONTROLLED HYPERTENSION: ICD-10-CM

## 2020-12-07 RX ORDER — LISINOPRIL AND HYDROCHLOROTHIAZIDE 20; 12.5 MG/1; MG/1
TABLET ORAL
Qty: 90 TABLET | Refills: 0 | Status: SHIPPED | OUTPATIENT
Start: 2020-12-07 | End: 2021-03-05

## 2020-12-09 ENCOUNTER — TELEPHONE (OUTPATIENT)
Dept: FAMILY MEDICINE CLINIC | Facility: CLINIC | Age: 55
End: 2020-12-09

## 2020-12-09 DIAGNOSIS — F41.9 ANXIETY: Primary | ICD-10-CM

## 2020-12-09 RX ORDER — BUSPIRONE HYDROCHLORIDE 5 MG/1
5 TABLET ORAL 3 TIMES DAILY
Qty: 90 TABLET | Refills: 1 | Status: SHIPPED | OUTPATIENT
Start: 2020-12-09 | End: 2021-04-20 | Stop reason: SDUPTHER

## 2020-12-09 NOTE — TELEPHONE ENCOUNTER
Pt has stayed on Prozac. She is wanting to restart the Buspar 15mg TID. She has plenty of it left if you say it's okay to restart.

## 2020-12-09 NOTE — TELEPHONE ENCOUNTER
I think pt is asking if she can restart her anxiety medication but I am not sure which one she is asking about, she has both Buspar and Fluoxetine on her medication list. She may need to take a lower dose to start out if she has been off of it so she does not have side effects. Please clarify. Thanks. Jessy

## 2020-12-09 NOTE — TELEPHONE ENCOUNTER
Patient would like to speak to someone about her anxiety. She had to put her father in a nursing home and is having a hard time with it.    Patient had a script filled in February and wants to know if she can start it back up again.    Please advise and call patient back at [phone]

## 2020-12-09 NOTE — TELEPHONE ENCOUNTER
Sent in 5 mg to start on 3 times a day, after 2 weeks if not helping can increase dose, call back if needs new dose sent in. gemac

## 2020-12-15 RX ORDER — ALBUTEROL SULFATE 90 UG/1
AEROSOL, METERED RESPIRATORY (INHALATION)
Qty: 9 G | Refills: 0 | Status: SHIPPED | OUTPATIENT
Start: 2020-12-15 | End: 2021-04-20 | Stop reason: SDUPTHER

## 2021-02-22 DIAGNOSIS — F41.9 ANXIETY: ICD-10-CM

## 2021-02-22 DIAGNOSIS — F32.A DEPRESSION, UNSPECIFIED DEPRESSION TYPE: ICD-10-CM

## 2021-02-23 RX ORDER — FLUOXETINE HYDROCHLORIDE 20 MG/1
CAPSULE ORAL
Qty: 90 CAPSULE | Refills: 0 | Status: SHIPPED | OUTPATIENT
Start: 2021-02-23 | End: 2021-04-20 | Stop reason: SDUPTHER

## 2021-03-05 DIAGNOSIS — I10 WELL-CONTROLLED HYPERTENSION: ICD-10-CM

## 2021-03-05 RX ORDER — LISINOPRIL AND HYDROCHLOROTHIAZIDE 20; 12.5 MG/1; MG/1
TABLET ORAL
Qty: 90 TABLET | Refills: 0 | Status: SHIPPED | OUTPATIENT
Start: 2021-03-05 | End: 2021-04-20 | Stop reason: SDUPTHER

## 2021-04-07 DIAGNOSIS — E03.8 OTHER SPECIFIED HYPOTHYROIDISM: ICD-10-CM

## 2021-04-07 RX ORDER — LEVOTHYROXINE SODIUM 100 UG/1
TABLET ORAL
Qty: 30 TABLET | Refills: 0 | Status: SHIPPED | OUTPATIENT
Start: 2021-04-07 | End: 2021-04-20 | Stop reason: SDUPTHER

## 2021-04-20 ENCOUNTER — OFFICE VISIT (OUTPATIENT)
Dept: FAMILY MEDICINE CLINIC | Facility: CLINIC | Age: 56
End: 2021-04-20

## 2021-04-20 VITALS
TEMPERATURE: 98.2 F | SYSTOLIC BLOOD PRESSURE: 124 MMHG | HEIGHT: 66 IN | RESPIRATION RATE: 18 BRPM | HEART RATE: 76 BPM | BODY MASS INDEX: 35.15 KG/M2 | DIASTOLIC BLOOD PRESSURE: 84 MMHG

## 2021-04-20 DIAGNOSIS — J45.20 MILD INTERMITTENT ASTHMA WITHOUT COMPLICATION: ICD-10-CM

## 2021-04-20 DIAGNOSIS — E03.8 OTHER SPECIFIED HYPOTHYROIDISM: ICD-10-CM

## 2021-04-20 DIAGNOSIS — F32.A DEPRESSION, UNSPECIFIED DEPRESSION TYPE: ICD-10-CM

## 2021-04-20 DIAGNOSIS — E55.9 VITAMIN D DEFICIENCY: ICD-10-CM

## 2021-04-20 DIAGNOSIS — I10 WELL-CONTROLLED HYPERTENSION: ICD-10-CM

## 2021-04-20 DIAGNOSIS — J30.89 ENVIRONMENTAL AND SEASONAL ALLERGIES: ICD-10-CM

## 2021-04-20 DIAGNOSIS — F41.9 ANXIETY: ICD-10-CM

## 2021-04-20 DIAGNOSIS — E78.2 MIXED HYPERLIPIDEMIA: ICD-10-CM

## 2021-04-20 DIAGNOSIS — Z00.00 WELL ADULT EXAM: Primary | ICD-10-CM

## 2021-04-20 DIAGNOSIS — Z13.1 SCREENING FOR DIABETES MELLITUS: ICD-10-CM

## 2021-04-20 PROCEDURE — 99396 PREV VISIT EST AGE 40-64: CPT | Performed by: NURSE PRACTITIONER

## 2021-04-20 RX ORDER — MONTELUKAST SODIUM 10 MG/1
10 TABLET ORAL NIGHTLY
Qty: 90 TABLET | Refills: 1 | Status: SHIPPED | OUTPATIENT
Start: 2021-04-20 | End: 2022-12-01

## 2021-04-20 RX ORDER — ALBUTEROL SULFATE 90 UG/1
2 AEROSOL, METERED RESPIRATORY (INHALATION) EVERY 4 HOURS PRN
Qty: 18 G | Refills: 1 | Status: SHIPPED | OUTPATIENT
Start: 2021-04-20 | End: 2021-12-13

## 2021-04-20 RX ORDER — FLUOXETINE HYDROCHLORIDE 20 MG/1
20 CAPSULE ORAL DAILY
Qty: 90 CAPSULE | Refills: 3 | Status: SHIPPED | OUTPATIENT
Start: 2021-04-20 | End: 2022-12-01 | Stop reason: SDUPTHER

## 2021-04-20 RX ORDER — ESTRADIOL 0.04 MG/D
1 FILM, EXTENDED RELEASE TRANSDERMAL 2 TIMES WEEKLY
Qty: 24 PATCH | Refills: 3 | Status: SHIPPED | OUTPATIENT
Start: 2021-04-22 | End: 2022-12-01

## 2021-04-20 RX ORDER — LEVOTHYROXINE SODIUM 0.1 MG/1
100 TABLET ORAL DAILY
Qty: 90 TABLET | Refills: 3 | Status: SHIPPED | OUTPATIENT
Start: 2021-04-20 | End: 2022-12-09 | Stop reason: SDUPTHER

## 2021-04-20 RX ORDER — BUSPIRONE HYDROCHLORIDE 5 MG/1
5 TABLET ORAL 3 TIMES DAILY
Qty: 90 TABLET | Refills: 3 | Status: SHIPPED | OUTPATIENT
Start: 2021-04-20 | End: 2022-12-01

## 2021-04-20 RX ORDER — LISINOPRIL AND HYDROCHLOROTHIAZIDE 20; 12.5 MG/1; MG/1
1 TABLET ORAL DAILY
Qty: 90 TABLET | Refills: 3 | Status: SHIPPED | OUTPATIENT
Start: 2021-04-20

## 2021-04-20 RX ORDER — MULTIVIT WITH MINERALS/LUTEIN
250 TABLET ORAL DAILY
COMMUNITY

## 2021-04-20 RX ORDER — FLUTICASONE PROPIONATE 50 MCG
SPRAY, SUSPENSION (ML) NASAL
Qty: 3 G | Refills: 3 | Status: SHIPPED | OUTPATIENT
Start: 2021-04-20

## 2021-04-20 RX ORDER — CHLORAL HYDRATE 500 MG
CAPSULE ORAL
COMMUNITY

## 2021-04-20 RX ORDER — LIOTHYRONINE SODIUM 5 UG/1
5 TABLET ORAL DAILY
Qty: 90 TABLET | Refills: 1 | Status: SHIPPED | OUTPATIENT
Start: 2021-04-20 | End: 2021-07-08

## 2021-04-20 NOTE — PROGRESS NOTES
"Chief Complaint  Annual Exam (no pap)    Subjective          Razia Sequeira presents to [facility]  History of Present Illness  Annual Exam  Fasting for labs  Up to date on pap smear (2020 with GYN)-normal  Weight gain over COVID, stress eating after death of father also  Back to exercising regularly, watching carb intake now  Allergies and asthma- not controlled  Flaring up asthma, breathing   Came back from [place] no issues there but once came back to Asthma  Episodes of anxiety over the past year  Walking up stairs making her feel SOA using Albuterol inhaler daily helps  Needs a refill on thyroid meds  Up to date on eye exam   Up to date on dental exam  Has not gotten Covid vaccine and does not plan on it      Objective   Vital Signs:   /84   Pulse 76   Temp 98.2 °F (36.8 °C)   Resp 18   Ht 167.6 cm (66\")   BMI 35.15 kg/m²     Physical Exam  Vitals and nursing note reviewed.   Constitutional:       General: She is not in acute distress.     Appearance: Normal appearance. She is well-developed. She is obese. She is not ill-appearing or toxic-appearing.   HENT:      Head: Normocephalic and atraumatic.      Right Ear: Tympanic membrane, ear canal and external ear normal.      Left Ear: Tympanic membrane, ear canal and external ear normal.      Nose: Congestion present. No mucosal edema or rhinorrhea.      Right Sinus: No maxillary sinus tenderness or frontal sinus tenderness.      Left Sinus: No maxillary sinus tenderness or frontal sinus tenderness.      Mouth/Throat:      Mouth: Mucous membranes are moist.      Pharynx: Oropharynx is clear. Uvula midline. No oropharyngeal exudate or posterior oropharyngeal erythema.   Eyes:      General: Lids are normal.      Extraocular Movements: Extraocular movements intact.      Conjunctiva/sclera: Conjunctivae normal.      Pupils: Pupils are equal, round, and reactive to light.   Neck:      Thyroid: No thyroid mass or " thyromegaly.      Vascular: No carotid bruit or JVD.      Trachea: Trachea normal.   Cardiovascular:      Rate and Rhythm: Normal rate and regular rhythm.      Pulses: Normal pulses.      Heart sounds: Normal heart sounds, S1 normal and S2 normal. No murmur heard.   No gallop.    Pulmonary:      Effort: Pulmonary effort is normal. No respiratory distress.      Breath sounds: Normal breath sounds. No wheezing or rales.   Abdominal:      General: Bowel sounds are normal. There is no distension.      Palpations: Abdomen is soft. There is no mass.      Tenderness: There is no abdominal tenderness. There is no guarding.      Hernia: No hernia is present.   Musculoskeletal:         General: Normal range of motion.      Cervical back: Normal range of motion and neck supple.      Right lower leg: No edema.      Left lower leg: No edema.   Lymphadenopathy:      Head:      Right side of head: No tonsillar, preauricular, posterior auricular or occipital adenopathy.      Left side of head: No tonsillar, preauricular, posterior auricular or occipital adenopathy.      Cervical: No cervical adenopathy.   Skin:     General: Skin is warm and dry.      Capillary Refill: Capillary refill takes less than 2 seconds.      Findings: No rash.      Nails: There is no clubbing.   Neurological:      General: No focal deficit present.      Mental Status: She is alert and oriented to person, place, and time.      Gait: Gait normal.      Deep Tendon Reflexes: Reflexes are normal and symmetric. Reflexes normal.   Psychiatric:         Mood and Affect: Mood normal.         Speech: Speech normal.         Behavior: Behavior normal.         Thought Content: Thought content normal.         Judgment: Judgment normal.        Result Review :                 Assessment and Plan    Diagnoses and all orders for this visit:    1. Well adult exam (Primary)    2. Mild intermittent asthma without complication  -     montelukast (Singulair) 10 MG tablet; Take 1  tablet by mouth Every Night.  Dispense: 90 tablet; Refill: 1  -     albuterol sulfate  (90 Base) MCG/ACT inhaler; Inhale 2 puffs Every 4 (Four) Hours As Needed for Wheezing.  Dispense: 18 g; Refill: 1    3. Well-controlled hypertension  -     Comprehensive Metabolic Panel  -     CBC & Differential  -     lisinopril-hydrochlorothiazide (PRINZIDE,ZESTORETIC) 20-12.5 MG per tablet; Take 1 tablet by mouth Daily.  Dispense: 90 tablet; Refill: 3    4. Other specified hypothyroidism  -     liothyronine (Cytomel) 5 MCG tablet; Take 1 tablet by mouth Daily.  Dispense: 90 tablet; Refill: 1  -     TSH  -     T3, free  -     levothyroxine (Euthyrox) 100 MCG tablet; Take 1 tablet by mouth Daily.  Dispense: 90 tablet; Refill: 3    5. Vitamin D deficiency  -     Vitamin D 25 Hydroxy    6. Mixed hyperlipidemia  -     Lipid Panel    7. Screening for diabetes mellitus  -     Hemoglobin A1c    8. Anxiety  -     busPIRone (BUSPAR) 5 MG tablet; Take 1 tablet by mouth 3 (Three) Times a Day.  Dispense: 90 tablet; Refill: 3  -     FLUoxetine (PROzac) 20 MG capsule; Take 1 capsule by mouth Daily.  Dispense: 90 capsule; Refill: 3    9. Depression, unspecified depression type  -     FLUoxetine (PROzac) 20 MG capsule; Take 1 capsule by mouth Daily.  Dispense: 90 capsule; Refill: 3    10. Environmental and seasonal allergies  -     montelukast (Singulair) 10 MG tablet; Take 1 tablet by mouth Every Night.  Dispense: 90 tablet; Refill: 1  -     fluticasone (Flonase) 50 MCG/ACT nasal spray; 1-2 sprays in each nostril daily  Dispense: 3 g; Refill: 3    Other orders  -     mometasone (ASMANEX TWISTHALER) inhaler 110 mcg/inhalation; Inhale 2 puffs Daily.  Dispense: 2 each; Refill: 0  -     estradiol (VIVELLE-DOT) 0.0375 MG/24HR patch; Place 1 patch on the skin as directed by provider 2 (Two) Times a Week.  Dispense: 24 patch; Refill: 3        Follow Up   Return in about 6 weeks (around 6/1/2021).  Patient was given instructions and counseling  regarding her condition or for health maintenance advice. Please see specific information pulled into the AVS if appropriate.     Health advise: healthy food choices with fresh fruits and vegetables, maintain sleep pattern at least 8 hours, avoid texting and distracted driving practices; wear safety belt, engage in regular exercise, work on getting to a healthy weight (BMI 25 or less), avoid excessive alcohol intake. Maintain immunizations that are up to date. Maintain health maintenance: Mammogram every 2 yrs, Pap smear every 2-3 yrs, Colonoscopy every 10 yrs.  Follow up with PCP if struggling with depression or anxiety. Keep regular dental and eye exams. Brush and floss teeth daily.   F/U annually and prn.

## 2021-04-21 DIAGNOSIS — E03.8 HYPOTHYROIDISM DUE TO HASHIMOTO'S THYROIDITIS: Primary | ICD-10-CM

## 2021-04-21 DIAGNOSIS — E06.3 HYPOTHYROIDISM DUE TO HASHIMOTO'S THYROIDITIS: Primary | ICD-10-CM

## 2021-04-21 LAB
25(OH)D3+25(OH)D2 SERPL-MCNC: 32 NG/ML (ref 30–100)
ALBUMIN SERPL-MCNC: 4.4 G/DL (ref 3.5–5.2)
ALBUMIN/GLOB SERPL: 1.7 G/DL
ALP SERPL-CCNC: 84 U/L (ref 39–117)
ALT SERPL-CCNC: 50 U/L (ref 1–33)
AST SERPL-CCNC: 36 U/L (ref 1–32)
BASOPHILS # BLD AUTO: 0.04 10*3/MM3 (ref 0–0.2)
BASOPHILS NFR BLD AUTO: 0.8 % (ref 0–1.5)
BILIRUB SERPL-MCNC: 0.3 MG/DL (ref 0–1.2)
BUN SERPL-MCNC: 13 MG/DL (ref 6–20)
BUN/CREAT SERPL: 17.3 (ref 7–25)
CALCIUM SERPL-MCNC: 10 MG/DL (ref 8.6–10.5)
CHLORIDE SERPL-SCNC: 103 MMOL/L (ref 98–107)
CHOLEST SERPL-MCNC: 232 MG/DL (ref 0–200)
CO2 SERPL-SCNC: 26.4 MMOL/L (ref 22–29)
CREAT SERPL-MCNC: 0.75 MG/DL (ref 0.57–1)
EOSINOPHIL # BLD AUTO: 0.26 10*3/MM3 (ref 0–0.4)
EOSINOPHIL NFR BLD AUTO: 5.3 % (ref 0.3–6.2)
ERYTHROCYTE [DISTWIDTH] IN BLOOD BY AUTOMATED COUNT: 13.5 % (ref 12.3–15.4)
GLOBULIN SER CALC-MCNC: 2.6 GM/DL
GLUCOSE SERPL-MCNC: 96 MG/DL (ref 65–99)
HBA1C MFR BLD: 6 % (ref 4.8–5.6)
HCT VFR BLD AUTO: 45.2 % (ref 34–46.6)
HDLC SERPL-MCNC: 56 MG/DL (ref 40–60)
HGB BLD-MCNC: 14.8 G/DL (ref 12–15.9)
IMM GRANULOCYTES # BLD AUTO: 0.01 10*3/MM3 (ref 0–0.05)
IMM GRANULOCYTES NFR BLD AUTO: 0.2 % (ref 0–0.5)
LDLC SERPL CALC-MCNC: 142 MG/DL (ref 0–100)
LYMPHOCYTES # BLD AUTO: 1.56 10*3/MM3 (ref 0.7–3.1)
LYMPHOCYTES NFR BLD AUTO: 31.6 % (ref 19.6–45.3)
MCH RBC QN AUTO: 28.3 PG (ref 26.6–33)
MCHC RBC AUTO-ENTMCNC: 32.7 G/DL (ref 31.5–35.7)
MCV RBC AUTO: 86.4 FL (ref 79–97)
MONOCYTES # BLD AUTO: 0.43 10*3/MM3 (ref 0.1–0.9)
MONOCYTES NFR BLD AUTO: 8.7 % (ref 5–12)
NEUTROPHILS # BLD AUTO: 2.63 10*3/MM3 (ref 1.7–7)
NEUTROPHILS NFR BLD AUTO: 53.4 % (ref 42.7–76)
NRBC BLD AUTO-RTO: 0 /100 WBC (ref 0–0.2)
PLATELET # BLD AUTO: 224 10*3/MM3 (ref 140–450)
POTASSIUM SERPL-SCNC: 4.7 MMOL/L (ref 3.5–5.2)
PROT SERPL-MCNC: 7 G/DL (ref 6–8.5)
RBC # BLD AUTO: 5.23 10*6/MM3 (ref 3.77–5.28)
SODIUM SERPL-SCNC: 139 MMOL/L (ref 136–145)
T3FREE SERPL-MCNC: 3.4 PG/ML (ref 2–4.4)
TRIGL SERPL-MCNC: 189 MG/DL (ref 0–150)
TSH SERPL DL<=0.005 MIU/L-ACNC: 0.09 UIU/ML (ref 0.27–4.2)
VLDLC SERPL CALC-MCNC: 34 MG/DL (ref 5–40)
WBC # BLD AUTO: 4.93 10*3/MM3 (ref 3.4–10.8)

## 2021-05-26 DIAGNOSIS — J45.20 MILD INTERMITTENT REACTIVE AIRWAY DISEASE WITHOUT COMPLICATION: ICD-10-CM

## 2021-06-16 ENCOUNTER — LAB (OUTPATIENT)
Dept: FAMILY MEDICINE CLINIC | Facility: CLINIC | Age: 56
End: 2021-06-16

## 2021-06-16 DIAGNOSIS — I10 WELL-CONTROLLED HYPERTENSION: ICD-10-CM

## 2021-06-16 RX ORDER — LISINOPRIL AND HYDROCHLOROTHIAZIDE 20; 12.5 MG/1; MG/1
TABLET ORAL
Qty: 90 TABLET | Refills: 0 | OUTPATIENT
Start: 2021-06-16

## 2021-07-08 ENCOUNTER — TELEPHONE (OUTPATIENT)
Dept: FAMILY MEDICINE CLINIC | Facility: CLINIC | Age: 56
End: 2021-07-08

## 2021-07-08 NOTE — TELEPHONE ENCOUNTER
Caller: Razia Sequeira    Relationship: Self    Best call back number: [phone]    What medications are you currently taking:   Current Outpatient Medications on File Prior to Visit   Medication Sig Dispense Refill   • albuterol sulfate  (90 Base) MCG/ACT inhaler Inhale 2 puffs Every 4 (Four) Hours As Needed for Wheezing. 18 g 1   • busPIRone (BUSPAR) 5 MG tablet Take 1 tablet by mouth 3 (Three) Times a Day. 90 tablet 3   • EQ Loratadine 10 MG tablet Take 1 tablet by mouth once daily 90 tablet 1   • estradiol (VIVELLE-DOT) 0.0375 MG/24HR patch Place 1 patch on the skin as directed by provider 2 (Two) Times a Week. 24 patch 3   • Flaxseed, Linseed, (FLAX SEEDS PO) Take  by mouth.     • FLUoxetine (PROzac) 20 MG capsule Take 1 capsule by mouth Daily. 90 capsule 3   • fluticasone (Flonase) 50 MCG/ACT nasal spray 1-2 sprays in each nostril daily 3 g 3   • levothyroxine (Euthyrox) 100 MCG tablet Take 1 tablet by mouth Daily. 90 tablet 3   • liothyronine (Cytomel) 5 MCG tablet Take 1 tablet by mouth Daily. 90 tablet 1   • lisinopril-hydrochlorothiazide (PRINZIDE,ZESTORETIC) 20-12.5 MG per tablet Take 1 tablet by mouth Daily. 90 tablet 3   • Magnesium Chloride (MAGNESIUM DR PO) Take  by mouth.     • mometasone (ASMANEX TWISTHALER) inhaler 110 mcg/inhalation Inhale 2 puffs Daily. 2 each 0   • montelukast (Singulair) 10 MG tablet Take 1 tablet by mouth Every Night. 90 tablet 1   • Multiple Vitamins-Minerals (MULTIVITAMIN ADULT PO) Take  by mouth.     • Omega-3 Fatty Acids (fish oil) 1000 MG capsule capsule Take  by mouth Daily With Breakfast.     • vitamin C (ASCORBIC ACID) 250 MG tablet Take 250 mg by mouth Daily.     • Zinc Acetate, Oral, (ZINC ACETATE PO) Take  by mouth.       No current facility-administered medications on file prior to visit.        Which medication are you concerned about:   liothyronine (Cytomel) 5 MCG tablet    Who prescribed you this medication:   BRIGITTE BOYLE     What are your  concerns: PATIENT STATED THAT SHE GOT HOT FLASHES AND WAS HOT ALL THE TIME WHILE TAKING THIS MEDICATION ALONG WITH NOT BEING ABLE TO SLEEP     PATIENT STATED THAT SHE LOOKED UP THE SIDE EFFECTS OF THIS MEDICATION AND THESE WHERE SOME OF THE SIDE EFFECTS     PATIENT STATED THAT SHE STOPPED TAKING THIS MEDICATION A WEEK AGO AND IS NOW NO LONGER HAVING THIS EFFECTS SINCE SHE HAS STOPPED THE MEDICATION       How long have you been taking these medications: PATIENT STATED THAT SHE STARTED TAKING THIS MEDICATION THE END OF April AND STOPPED TAKING THIS MEDICATION THE END OF June     How long have you had these concerns:PATIENT STATED THAT SHE NOTICED THE SIDE EFFECTS THE FIRST OF June

## 2021-12-13 DIAGNOSIS — J45.20 MILD INTERMITTENT ASTHMA WITHOUT COMPLICATION: ICD-10-CM

## 2021-12-13 RX ORDER — ALBUTEROL SULFATE 90 UG/1
AEROSOL, METERED RESPIRATORY (INHALATION)
Qty: 18 G | Refills: 0 | Status: SHIPPED | OUTPATIENT
Start: 2021-12-13

## 2022-01-11 DIAGNOSIS — J45.20 MILD INTERMITTENT ASTHMA WITHOUT COMPLICATION: ICD-10-CM

## 2022-01-11 RX ORDER — ALBUTEROL SULFATE 90 UG/1
AEROSOL, METERED RESPIRATORY (INHALATION)
Qty: 18 G | Refills: 0 | OUTPATIENT
Start: 2022-01-11

## 2022-08-22 ENCOUNTER — TRANSCRIBE ORDERS (OUTPATIENT)
Dept: ADMINISTRATIVE | Facility: HOSPITAL | Age: 57
End: 2022-08-22

## 2022-08-22 DIAGNOSIS — Z12.31 SCREENING MAMMOGRAM FOR BREAST CANCER: Primary | ICD-10-CM

## 2022-09-29 ENCOUNTER — HOSPITAL ENCOUNTER (OUTPATIENT)
Dept: GENERAL RADIOLOGY | Facility: HOSPITAL | Age: 57
Discharge: HOME | End: 2022-09-29

## 2022-09-29 ENCOUNTER — TRANSCRIBE ORDERS (OUTPATIENT)
Dept: ADMINISTRATIVE | Facility: HOSPITAL | Age: 57
End: 2022-09-29

## 2022-09-29 ENCOUNTER — HOSPITAL ENCOUNTER (OUTPATIENT)
Dept: MAMMOGRAPHY | Facility: HOSPITAL | Age: 57
Discharge: HOME | End: 2022-09-29

## 2022-09-29 DIAGNOSIS — M54.50 CHRONIC LEFT-SIDED LOW BACK PAIN WITHOUT SCIATICA: Primary | ICD-10-CM

## 2022-09-29 DIAGNOSIS — G89.29 CHRONIC LEFT-SIDED LOW BACK PAIN WITHOUT SCIATICA: Primary | ICD-10-CM

## 2022-09-29 DIAGNOSIS — Z12.31 SCREENING MAMMOGRAM FOR BREAST CANCER: ICD-10-CM

## 2022-09-29 PROCEDURE — 77067 SCR MAMMO BI INCL CAD: CPT

## 2022-09-29 PROCEDURE — 77063 BREAST TOMOSYNTHESIS BI: CPT

## 2022-09-29 PROCEDURE — 72100 X-RAY EXAM L-S SPINE 2/3 VWS: CPT

## 2022-09-29 PROCEDURE — 77063 BREAST TOMOSYNTHESIS BI: CPT | Performed by: RADIOLOGY

## 2022-09-29 PROCEDURE — 77067 SCR MAMMO BI INCL CAD: CPT | Performed by: RADIOLOGY

## 2022-11-18 ENCOUNTER — HOSPITAL ENCOUNTER (INPATIENT)
Facility: HOSPITAL | Age: 57
LOS: 4 days | Discharge: HOME | End: 2022-11-22
Attending: INTERNAL MEDICINE | Admitting: INTERNAL MEDICINE

## 2022-11-18 ENCOUNTER — APPOINTMENT (OUTPATIENT)
Dept: GENERAL RADIOLOGY | Facility: HOSPITAL | Age: 57
End: 2022-11-18

## 2022-11-18 ENCOUNTER — APPOINTMENT (OUTPATIENT)
Dept: CARDIOLOGY | Facility: HOSPITAL | Age: 57
End: 2022-11-18

## 2022-11-18 DIAGNOSIS — I30.0 ACUTE IDIOPATHIC PERICARDITIS: ICD-10-CM

## 2022-11-18 DIAGNOSIS — I31.39 PERICARDIAL EFFUSION: ICD-10-CM

## 2022-11-18 DIAGNOSIS — R79.89 ELEVATED LFTS: ICD-10-CM

## 2022-11-18 DIAGNOSIS — I31.4 CARDIAC TAMPONADE: Primary | ICD-10-CM

## 2022-11-18 PROBLEM — N17.9 AKI (ACUTE KIDNEY INJURY) (HCC): Status: ACTIVE | Noted: 2022-11-18

## 2022-11-18 PROBLEM — A41.9 SEPSIS ASSOCIATED HYPOTENSION: Status: ACTIVE | Noted: 2022-11-18

## 2022-11-18 PROBLEM — I95.9 SEPSIS ASSOCIATED HYPOTENSION: Status: ACTIVE | Noted: 2022-11-18

## 2022-11-18 PROBLEM — R57.9 SHOCK: Status: ACTIVE | Noted: 2022-11-18

## 2022-11-18 LAB
ALBUMIN SERPL-MCNC: 3.3 G/DL (ref 3.5–5.2)
ALBUMIN/GLOB SERPL: 1 G/DL
ALP SERPL-CCNC: 67 U/L (ref 39–117)
ALT SERPL W P-5'-P-CCNC: 94 U/L (ref 1–33)
ANION GAP SERPL CALCULATED.3IONS-SCNC: 16 MMOL/L (ref 5–15)
AST SERPL-CCNC: 95 U/L (ref 1–32)
BASOPHILS # BLD AUTO: 0.05 10*3/MM3 (ref 0–0.2)
BASOPHILS NFR BLD AUTO: 0.2 % (ref 0–1.5)
BH CV ECHO MEAS - AO MAX PG: 6 MMHG
BH CV ECHO MEAS - AO MEAN PG: 3 MMHG
BH CV ECHO MEAS - AO ROOT DIAM: 2.9 CM
BH CV ECHO MEAS - AO V2 MAX: 122 CM/SEC
BH CV ECHO MEAS - AO V2 VTI: 21.3 CM
BH CV ECHO MEAS - AVA(I,D): 2.8 CM2
BH CV ECHO MEAS - EDV(CUBED): 29.8 ML
BH CV ECHO MEAS - EDV(MOD-SP4): 79.7 ML
BH CV ECHO MEAS - EF(MOD-SP4): 60.1 %
BH CV ECHO MEAS - ESV(CUBED): 6.9 ML
BH CV ECHO MEAS - ESV(MOD-SP4): 31.8 ML
BH CV ECHO MEAS - FS: 38.7 %
BH CV ECHO MEAS - IVS/LVPW: 1.08 CM
BH CV ECHO MEAS - IVSD: 1.4 CM
BH CV ECHO MEAS - LA DIMENSION: 3.6 CM
BH CV ECHO MEAS - LV MASS(C)D: 138.1 GRAMS
BH CV ECHO MEAS - LV MAX PG: 3.4 MMHG
BH CV ECHO MEAS - LV MEAN PG: 2 MMHG
BH CV ECHO MEAS - LV V1 MAX: 91.8 CM/SEC
BH CV ECHO MEAS - LV V1 VTI: 19.2 CM
BH CV ECHO MEAS - LVIDD: 3.1 CM
BH CV ECHO MEAS - LVIDS: 1.9 CM
BH CV ECHO MEAS - LVOT AREA: 3.1 CM2
BH CV ECHO MEAS - LVOT DIAM: 2 CM
BH CV ECHO MEAS - LVPWD: 1.3 CM
BH CV ECHO MEAS - MV A MAX VEL: 57.6 CM/SEC
BH CV ECHO MEAS - MV DEC SLOPE: 292 CM/SEC2
BH CV ECHO MEAS - MV DEC TIME: 0.18 MSEC
BH CV ECHO MEAS - MV E MAX VEL: 51.9 CM/SEC
BH CV ECHO MEAS - MV E/A: 0.9
BH CV ECHO MEAS - PA ACC TIME: 0.12 SEC
BH CV ECHO MEAS - PA PR(ACCEL): 26.8 MMHG
BH CV ECHO MEAS - SV(LVOT): 60.3 ML
BH CV ECHO MEAS - SV(MOD-SP4): 47.9 ML
BH CV XLRA - RV BASE: 2.8 CM
BH CV XLRA - RV LENGTH: 6.3 CM
BH CV XLRA - RV MID: 2.9 CM
BILIRUB SERPL-MCNC: 1.7 MG/DL (ref 0–1.2)
BUN SERPL-MCNC: 18 MG/DL (ref 6–20)
BUN/CREAT SERPL: 11.8 (ref 7–25)
CALCIUM SPEC-SCNC: 8.5 MG/DL (ref 8.6–10.5)
CHLORIDE SERPL-SCNC: 99 MMOL/L (ref 98–107)
CK SERPL-CCNC: 113 U/L (ref 20–180)
CO2 SERPL-SCNC: 16 MMOL/L (ref 22–29)
CREAT SERPL-MCNC: 1.53 MG/DL (ref 0.57–1)
CRP SERPL-MCNC: 4.65 MG/DL (ref 0–0.5)
DEPRECATED RDW RBC AUTO: 42.5 FL (ref 37–54)
EGFRCR SERPLBLD CKD-EPI 2021: 39 ML/MIN/1.73
EOSINOPHIL # BLD AUTO: 0 10*3/MM3 (ref 0–0.4)
EOSINOPHIL NFR BLD AUTO: 0 % (ref 0.3–6.2)
ERYTHROCYTE [DISTWIDTH] IN BLOOD BY AUTOMATED COUNT: 13.6 % (ref 12.3–15.4)
ERYTHROCYTE [SEDIMENTATION RATE] IN BLOOD: 24 MM/HR (ref 0–30)
GLOBULIN UR ELPH-MCNC: 3.3 GM/DL
GLUCOSE SERPL-MCNC: 166 MG/DL (ref 65–99)
HCT VFR BLD AUTO: 43.3 % (ref 34–46.6)
HGB BLD-MCNC: 14.4 G/DL (ref 12–15.9)
IMM GRANULOCYTES # BLD AUTO: 0.23 10*3/MM3 (ref 0–0.05)
IMM GRANULOCYTES NFR BLD AUTO: 0.9 % (ref 0–0.5)
LYMPHOCYTES # BLD AUTO: 1.71 10*3/MM3 (ref 0.7–3.1)
LYMPHOCYTES NFR BLD AUTO: 6.8 % (ref 19.6–45.3)
MAGNESIUM SERPL-MCNC: 2 MG/DL (ref 1.6–2.6)
MAXIMAL PREDICTED HEART RATE: 161 BPM
MCH RBC QN AUTO: 28.9 PG (ref 26.6–33)
MCHC RBC AUTO-ENTMCNC: 33.3 G/DL (ref 31.5–35.7)
MCV RBC AUTO: 86.9 FL (ref 79–97)
MONOCYTES # BLD AUTO: 1.48 10*3/MM3 (ref 0.1–0.9)
MONOCYTES NFR BLD AUTO: 5.9 % (ref 5–12)
NEUTROPHILS NFR BLD AUTO: 21.59 10*3/MM3 (ref 1.7–7)
NEUTROPHILS NFR BLD AUTO: 86.2 % (ref 42.7–76)
NRBC BLD AUTO-RTO: 0 /100 WBC (ref 0–0.2)
NT-PROBNP SERPL-MCNC: 941.5 PG/ML (ref 0–900)
PLATELET # BLD AUTO: 384 10*3/MM3 (ref 140–450)
PMV BLD AUTO: 11.5 FL (ref 6–12)
POTASSIUM SERPL-SCNC: 4 MMOL/L (ref 3.5–5.2)
PROCALCITONIN SERPL-MCNC: 0.27 NG/ML (ref 0–0.25)
PROT SERPL-MCNC: 6.6 G/DL (ref 6–8.5)
RBC # BLD AUTO: 4.98 10*6/MM3 (ref 3.77–5.28)
SODIUM SERPL-SCNC: 131 MMOL/L (ref 136–145)
STRESS TARGET HR: 137 BPM
TROPONIN T SERPL-MCNC: 0.03 NG/ML (ref 0–0.03)
TSH SERPL DL<=0.05 MIU/L-ACNC: 3.92 UIU/ML (ref 0.27–4.2)
WBC NRBC COR # BLD: 25.06 10*3/MM3 (ref 3.4–10.8)

## 2022-11-18 PROCEDURE — 93306 TTE W/DOPPLER COMPLETE: CPT | Performed by: INTERNAL MEDICINE

## 2022-11-18 PROCEDURE — 83605 ASSAY OF LACTIC ACID: CPT | Performed by: INTERNAL MEDICINE

## 2022-11-18 PROCEDURE — 25010000002 PHENYLEPHRINE 10 MG/ML SOLUTION: Performed by: INTERNAL MEDICINE

## 2022-11-18 PROCEDURE — 84145 PROCALCITONIN (PCT): CPT | Performed by: INTERNAL MEDICINE

## 2022-11-18 PROCEDURE — 25010000002 MIDAZOLAM PER 1 MG: Performed by: INTERNAL MEDICINE

## 2022-11-18 PROCEDURE — 4A033BC MEASUREMENT OF ARTERIAL PRESSURE, CORONARY, PERCUTANEOUS APPROACH: ICD-10-PCS | Performed by: INTERNAL MEDICINE

## 2022-11-18 PROCEDURE — 99223 1ST HOSP IP/OBS HIGH 75: CPT | Performed by: INTERNAL MEDICINE

## 2022-11-18 PROCEDURE — B2151ZZ FLUOROSCOPY OF LEFT HEART USING LOW OSMOLAR CONTRAST: ICD-10-PCS | Performed by: INTERNAL MEDICINE

## 2022-11-18 PROCEDURE — 83036 HEMOGLOBIN GLYCOSYLATED A1C: CPT | Performed by: INTERNAL MEDICINE

## 2022-11-18 PROCEDURE — 0 IOPAMIDOL PER 1 ML: Performed by: INTERNAL MEDICINE

## 2022-11-18 PROCEDURE — 86480 TB TEST CELL IMMUN MEASURE: CPT | Performed by: INTERNAL MEDICINE

## 2022-11-18 PROCEDURE — 83735 ASSAY OF MAGNESIUM: CPT | Performed by: INTERNAL MEDICINE

## 2022-11-18 PROCEDURE — 63710000001 PREDNISONE PER 1 MG: Performed by: INTERNAL MEDICINE

## 2022-11-18 PROCEDURE — 82550 ASSAY OF CK (CPK): CPT | Performed by: INTERNAL MEDICINE

## 2022-11-18 PROCEDURE — 84484 ASSAY OF TROPONIN QUANT: CPT | Performed by: INTERNAL MEDICINE

## 2022-11-18 PROCEDURE — C1769 GUIDE WIRE: HCPCS | Performed by: INTERNAL MEDICINE

## 2022-11-18 PROCEDURE — 80050 GENERAL HEALTH PANEL: CPT | Performed by: INTERNAL MEDICINE

## 2022-11-18 PROCEDURE — 83880 ASSAY OF NATRIURETIC PEPTIDE: CPT | Performed by: INTERNAL MEDICINE

## 2022-11-18 PROCEDURE — 4A023N7 MEASUREMENT OF CARDIAC SAMPLING AND PRESSURE, LEFT HEART, PERCUTANEOUS APPROACH: ICD-10-PCS | Performed by: INTERNAL MEDICINE

## 2022-11-18 PROCEDURE — 25010000002 FENTANYL CITRATE (PF) 50 MCG/ML SOLUTION: Performed by: INTERNAL MEDICINE

## 2022-11-18 PROCEDURE — 99291 CRITICAL CARE FIRST HOUR: CPT | Performed by: INTERNAL MEDICINE

## 2022-11-18 PROCEDURE — 87040 BLOOD CULTURE FOR BACTERIA: CPT | Performed by: INTERNAL MEDICINE

## 2022-11-18 PROCEDURE — 85652 RBC SED RATE AUTOMATED: CPT | Performed by: INTERNAL MEDICINE

## 2022-11-18 PROCEDURE — 25010000002 PHENYLEPHRINE 10 MG/ML SOLUTION 5 ML VIAL

## 2022-11-18 PROCEDURE — 93306 TTE W/DOPPLER COMPLETE: CPT

## 2022-11-18 PROCEDURE — 82565 ASSAY OF CREATININE: CPT

## 2022-11-18 PROCEDURE — 93458 L HRT ARTERY/VENTRICLE ANGIO: CPT | Performed by: INTERNAL MEDICINE

## 2022-11-18 PROCEDURE — B2111ZZ FLUOROSCOPY OF MULTIPLE CORONARY ARTERIES USING LOW OSMOLAR CONTRAST: ICD-10-PCS | Performed by: INTERNAL MEDICINE

## 2022-11-18 PROCEDURE — 25010000002 PHENYLEPHRINE 10 MG/ML SOLUTION 5 ML VIAL: Performed by: INTERNAL MEDICINE

## 2022-11-18 PROCEDURE — 86140 C-REACTIVE PROTEIN: CPT | Performed by: INTERNAL MEDICINE

## 2022-11-18 PROCEDURE — 93005 ELECTROCARDIOGRAM TRACING: CPT | Performed by: INTERNAL MEDICINE

## 2022-11-18 PROCEDURE — 25010000002 HEPARIN (PORCINE) PER 1000 UNITS: Performed by: INTERNAL MEDICINE

## 2022-11-18 PROCEDURE — 63710000001 PREDNISONE PER 5 MG: Performed by: INTERNAL MEDICINE

## 2022-11-18 PROCEDURE — C1894 INTRO/SHEATH, NON-LASER: HCPCS | Performed by: INTERNAL MEDICINE

## 2022-11-18 PROCEDURE — 71045 X-RAY EXAM CHEST 1 VIEW: CPT

## 2022-11-18 PROCEDURE — 85027 COMPLETE CBC AUTOMATED: CPT | Performed by: INTERNAL MEDICINE

## 2022-11-18 RX ORDER — NICARDIPINE HCL-0.9% SOD CHLOR 1 MG/10 ML
SYRINGE (ML) INTRAVENOUS
Status: DISCONTINUED | OUTPATIENT
Start: 2022-11-18 | End: 2022-11-18 | Stop reason: HOSPADM

## 2022-11-18 RX ORDER — COLCHICINE 0.6 MG/1
0.6 TABLET ORAL EVERY 12 HOURS SCHEDULED
Status: DISCONTINUED | OUTPATIENT
Start: 2022-11-18 | End: 2022-11-22 | Stop reason: HOSPADM

## 2022-11-18 RX ORDER — PHENYLEPHRINE HYDROCHLORIDE 10 MG/ML
INJECTION INTRAVENOUS
Status: DISCONTINUED | OUTPATIENT
Start: 2022-11-18 | End: 2022-11-18 | Stop reason: HOSPADM

## 2022-11-18 RX ORDER — SODIUM CHLORIDE 9 MG/ML
INJECTION, SOLUTION INTRAVENOUS
Status: COMPLETED | OUTPATIENT
Start: 2022-11-18 | End: 2022-11-18

## 2022-11-18 RX ORDER — HEPARIN SODIUM 1000 [USP'U]/ML
INJECTION, SOLUTION INTRAVENOUS; SUBCUTANEOUS
Status: DISCONTINUED | OUTPATIENT
Start: 2022-11-18 | End: 2022-11-18 | Stop reason: HOSPADM

## 2022-11-18 RX ORDER — MIDAZOLAM HYDROCHLORIDE 1 MG/ML
INJECTION INTRAMUSCULAR; INTRAVENOUS
Status: DISCONTINUED | OUTPATIENT
Start: 2022-11-18 | End: 2022-11-18 | Stop reason: HOSPADM

## 2022-11-18 RX ORDER — SODIUM CHLORIDE 9 MG/ML
125 INJECTION, SOLUTION INTRAVENOUS CONTINUOUS
Status: ACTIVE | OUTPATIENT
Start: 2022-11-18 | End: 2022-11-19

## 2022-11-18 RX ORDER — LIDOCAINE HYDROCHLORIDE 10 MG/ML
INJECTION, SOLUTION EPIDURAL; INFILTRATION; INTRACAUDAL; PERINEURAL
Status: DISCONTINUED | OUTPATIENT
Start: 2022-11-18 | End: 2022-11-18 | Stop reason: HOSPADM

## 2022-11-18 RX ORDER — FENTANYL CITRATE 50 UG/ML
INJECTION, SOLUTION INTRAMUSCULAR; INTRAVENOUS
Status: DISCONTINUED | OUTPATIENT
Start: 2022-11-18 | End: 2022-11-18 | Stop reason: HOSPADM

## 2022-11-18 RX ORDER — ACETAMINOPHEN 325 MG/1
650 TABLET ORAL EVERY 4 HOURS PRN
Status: DISCONTINUED | OUTPATIENT
Start: 2022-11-18 | End: 2022-11-19 | Stop reason: CLARIF

## 2022-11-18 RX ADMIN — PREDNISONE 30 MG: 20 TABLET ORAL at 23:18

## 2022-11-18 RX ADMIN — COLCHICINE 0.6 MG: 0.6 TABLET, FILM COATED ORAL at 23:17

## 2022-11-18 RX ADMIN — PHENYLEPHRINE HYDROCHLORIDE 0.5 MCG/KG/MIN: 10 INJECTION INTRAVENOUS at 22:26

## 2022-11-19 ENCOUNTER — APPOINTMENT (OUTPATIENT)
Dept: CARDIOLOGY | Facility: HOSPITAL | Age: 57
End: 2022-11-19

## 2022-11-19 PROBLEM — I30.0 ACUTE IDIOPATHIC PERICARDITIS: Status: ACTIVE | Noted: 2022-11-19

## 2022-11-19 LAB
ANION GAP SERPL CALCULATED.3IONS-SCNC: 17 MMOL/L (ref 5–15)
APPEARANCE FLD: ABNORMAL
B PARAPERT DNA SPEC QL NAA+PROBE: NOT DETECTED
B PERT DNA SPEC QL NAA+PROBE: NOT DETECTED
BUN SERPL-MCNC: 21 MG/DL (ref 6–20)
BUN/CREAT SERPL: 14 (ref 7–25)
C PNEUM DNA NPH QL NAA+NON-PROBE: NOT DETECTED
CALCIUM SPEC-SCNC: 8.9 MG/DL (ref 8.6–10.5)
CHLORIDE SERPL-SCNC: 100 MMOL/L (ref 98–107)
CHOLEST SERPL-MCNC: 154 MG/DL (ref 0–200)
CO2 SERPL-SCNC: 18 MMOL/L (ref 22–29)
COLOR FLD: YELLOW
CREAT SERPL-MCNC: 1.5 MG/DL (ref 0.57–1)
D-LACTATE SERPL-SCNC: 2.4 MMOL/L (ref 0.5–2)
D-LACTATE SERPL-SCNC: 3.9 MMOL/L (ref 0.5–2)
D-LACTATE SERPL-SCNC: 4.2 MMOL/L (ref 0.5–2)
D-LACTATE SERPL-SCNC: 4.7 MMOL/L (ref 0.5–2)
D-LACTATE SERPL-SCNC: 5.6 MMOL/L (ref 0.5–2)
DEPRECATED RDW RBC AUTO: 43.9 FL (ref 37–54)
EGFRCR SERPLBLD CKD-EPI 2021: 40 ML/MIN/1.73
ERYTHROCYTE [DISTWIDTH] IN BLOOD BY AUTOMATED COUNT: 13.8 % (ref 12.3–15.4)
FLUAV SUBTYP SPEC NAA+PROBE: NOT DETECTED
FLUBV RNA ISLT QL NAA+PROBE: NOT DETECTED
GLUCOSE SERPL-MCNC: 147 MG/DL (ref 65–99)
HADV DNA SPEC NAA+PROBE: NOT DETECTED
HBA1C MFR BLD: 5.9 % (ref 4.8–5.6)
HCOV 229E RNA SPEC QL NAA+PROBE: NOT DETECTED
HCOV HKU1 RNA SPEC QL NAA+PROBE: NOT DETECTED
HCOV NL63 RNA SPEC QL NAA+PROBE: NOT DETECTED
HCOV OC43 RNA SPEC QL NAA+PROBE: NOT DETECTED
HCT VFR BLD AUTO: 44.9 % (ref 34–46.6)
HDLC SERPL-MCNC: 40 MG/DL (ref 40–60)
HGB BLD-MCNC: 14.7 G/DL (ref 12–15.9)
HMPV RNA NPH QL NAA+NON-PROBE: NOT DETECTED
HPIV1 RNA ISLT QL NAA+PROBE: NOT DETECTED
HPIV2 RNA SPEC QL NAA+PROBE: NOT DETECTED
HPIV3 RNA NPH QL NAA+PROBE: NOT DETECTED
HPIV4 P GENE NPH QL NAA+PROBE: NOT DETECTED
LDLC SERPL CALC-MCNC: 97 MG/DL (ref 0–100)
LDLC/HDLC SERPL: 2.39 {RATIO}
LYMPHOCYTES NFR FLD MANUAL: 5 %
M PNEUMO IGG SER IA-ACNC: NOT DETECTED
MACROPHAGE FLUID: 8 %
MCH RBC QN AUTO: 28.7 PG (ref 26.6–33)
MCHC RBC AUTO-ENTMCNC: 32.7 G/DL (ref 31.5–35.7)
MCV RBC AUTO: 87.7 FL (ref 79–97)
MONOCYTES NFR FLD: 9 %
NEUTROPHILS NFR FLD MANUAL: 78 %
PLATELET # BLD AUTO: 449 10*3/MM3 (ref 140–450)
PMV BLD AUTO: 11.6 FL (ref 6–12)
POTASSIUM SERPL-SCNC: 4.7 MMOL/L (ref 3.5–5.2)
RBC # BLD AUTO: 5.12 10*6/MM3 (ref 3.77–5.28)
RBC # FLD AUTO: ABNORMAL /MM3
RHINOVIRUS RNA SPEC NAA+PROBE: NOT DETECTED
RSV RNA NPH QL NAA+NON-PROBE: NOT DETECTED
SARS-COV-2 RNA NPH QL NAA+NON-PROBE: NOT DETECTED
SODIUM SERPL-SCNC: 135 MMOL/L (ref 136–145)
TRIGL SERPL-MCNC: 93 MG/DL (ref 0–150)
TROPONIN T SERPL-MCNC: 0.03 NG/ML (ref 0–0.03)
VLDLC SERPL-MCNC: 17 MG/DL (ref 5–40)
WBC # FLD AUTO: ABNORMAL /MM3
WBC NRBC COR # BLD: 25.05 10*3/MM3 (ref 3.4–10.8)

## 2022-11-19 PROCEDURE — 63710000001 PREDNISONE PER 1 MG: Performed by: INTERNAL MEDICINE

## 2022-11-19 PROCEDURE — 87015 SPECIMEN INFECT AGNT CONCNTJ: CPT | Performed by: INTERNAL MEDICINE

## 2022-11-19 PROCEDURE — 86235 NUCLEAR ANTIGEN ANTIBODY: CPT | Performed by: INTERNAL MEDICINE

## 2022-11-19 PROCEDURE — 83605 ASSAY OF LACTIC ACID: CPT | Performed by: INTERNAL MEDICINE

## 2022-11-19 PROCEDURE — 93308 TTE F-UP OR LMTD: CPT

## 2022-11-19 PROCEDURE — 93308 TTE F-UP OR LMTD: CPT | Performed by: INTERNAL MEDICINE

## 2022-11-19 PROCEDURE — 86658 ENTEROVIRUS ANTIBODY: CPT | Performed by: INTERNAL MEDICINE

## 2022-11-19 PROCEDURE — 83615 LACTATE (LD) (LDH) ENZYME: CPT | Performed by: INTERNAL MEDICINE

## 2022-11-19 PROCEDURE — 0202U NFCT DS 22 TRGT SARS-COV-2: CPT

## 2022-11-19 PROCEDURE — 80061 LIPID PANEL: CPT | Performed by: INTERNAL MEDICINE

## 2022-11-19 PROCEDURE — 25010000002 CEFTRIAXONE PER 250 MG: Performed by: INTERNAL MEDICINE

## 2022-11-19 PROCEDURE — 87206 SMEAR FLUORESCENT/ACID STAI: CPT | Performed by: INTERNAL MEDICINE

## 2022-11-19 PROCEDURE — 89051 BODY FLUID CELL COUNT: CPT | Performed by: INTERNAL MEDICINE

## 2022-11-19 PROCEDURE — C1769 GUIDE WIRE: HCPCS | Performed by: INTERNAL MEDICINE

## 2022-11-19 PROCEDURE — 0W9D3ZZ DRAINAGE OF PERICARDIAL CAVITY, PERCUTANEOUS APPROACH: ICD-10-PCS | Performed by: INTERNAL MEDICINE

## 2022-11-19 PROCEDURE — 87070 CULTURE OTHR SPECIMN AEROBIC: CPT | Performed by: INTERNAL MEDICINE

## 2022-11-19 PROCEDURE — 87205 SMEAR GRAM STAIN: CPT | Performed by: INTERNAL MEDICINE

## 2022-11-19 PROCEDURE — 99232 SBSQ HOSP IP/OBS MODERATE 35: CPT | Performed by: INTERNAL MEDICINE

## 2022-11-19 PROCEDURE — 93005 ELECTROCARDIOGRAM TRACING: CPT | Performed by: INTERNAL MEDICINE

## 2022-11-19 PROCEDURE — 33017 PRCRD DRG 6YR+ W/O CGEN CAR: CPT | Performed by: INTERNAL MEDICINE

## 2022-11-19 PROCEDURE — 25010000002 MIDAZOLAM PER 1 MG: Performed by: INTERNAL MEDICINE

## 2022-11-19 PROCEDURE — 99233 SBSQ HOSP IP/OBS HIGH 50: CPT | Performed by: INTERNAL MEDICINE

## 2022-11-19 PROCEDURE — 80048 BASIC METABOLIC PNL TOTAL CA: CPT | Performed by: INTERNAL MEDICINE

## 2022-11-19 PROCEDURE — 84157 ASSAY OF PROTEIN OTHER: CPT | Performed by: INTERNAL MEDICINE

## 2022-11-19 PROCEDURE — 25010000002 FENTANYL CITRATE (PF) 50 MCG/ML SOLUTION: Performed by: INTERNAL MEDICINE

## 2022-11-19 PROCEDURE — C1729 CATH, DRAINAGE: HCPCS | Performed by: INTERNAL MEDICINE

## 2022-11-19 PROCEDURE — 82042 OTHER SOURCE ALBUMIN QUAN EA: CPT | Performed by: INTERNAL MEDICINE

## 2022-11-19 PROCEDURE — 86225 DNA ANTIBODY NATIVE: CPT | Performed by: INTERNAL MEDICINE

## 2022-11-19 PROCEDURE — 93321 DOPPLER ECHO F-UP/LMTD STD: CPT

## 2022-11-19 PROCEDURE — 87040 BLOOD CULTURE FOR BACTERIA: CPT | Performed by: INTERNAL MEDICINE

## 2022-11-19 PROCEDURE — 84484 ASSAY OF TROPONIN QUANT: CPT | Performed by: INTERNAL MEDICINE

## 2022-11-19 PROCEDURE — 82945 GLUCOSE OTHER FLUID: CPT | Performed by: INTERNAL MEDICINE

## 2022-11-19 PROCEDURE — C1894 INTRO/SHEATH, NON-LASER: HCPCS | Performed by: INTERNAL MEDICINE

## 2022-11-19 PROCEDURE — 93321 DOPPLER ECHO F-UP/LMTD STD: CPT | Performed by: INTERNAL MEDICINE

## 2022-11-19 PROCEDURE — 87116 MYCOBACTERIA CULTURE: CPT | Performed by: INTERNAL MEDICINE

## 2022-11-19 DEVICE — IMPLANTABLE DEVICE: Type: IMPLANTABLE DEVICE | Status: FUNCTIONAL

## 2022-11-19 RX ORDER — ACETAMINOPHEN 325 MG/1
650 TABLET ORAL EVERY 4 HOURS PRN
Status: DISCONTINUED | OUTPATIENT
Start: 2022-11-19 | End: 2022-11-22 | Stop reason: HOSPADM

## 2022-11-19 RX ORDER — SODIUM CHLORIDE 9 MG/ML
125 INJECTION, SOLUTION INTRAVENOUS CONTINUOUS
Status: ACTIVE | OUTPATIENT
Start: 2022-11-19 | End: 2022-11-19

## 2022-11-19 RX ORDER — MIDAZOLAM HYDROCHLORIDE 1 MG/ML
INJECTION INTRAMUSCULAR; INTRAVENOUS
Status: DISCONTINUED | OUTPATIENT
Start: 2022-11-19 | End: 2022-11-19 | Stop reason: HOSPADM

## 2022-11-19 RX ORDER — LIDOCAINE HYDROCHLORIDE 10 MG/ML
INJECTION, SOLUTION EPIDURAL; INFILTRATION; INTRACAUDAL; PERINEURAL
Status: DISCONTINUED | OUTPATIENT
Start: 2022-11-19 | End: 2022-11-19 | Stop reason: HOSPADM

## 2022-11-19 RX ORDER — FLUOXETINE HYDROCHLORIDE 20 MG/1
20 CAPSULE ORAL DAILY
Status: DISCONTINUED | OUTPATIENT
Start: 2022-11-19 | End: 2022-11-22 | Stop reason: HOSPADM

## 2022-11-19 RX ORDER — BUSPIRONE HYDROCHLORIDE 5 MG/1
5 TABLET ORAL 3 TIMES DAILY
Status: DISCONTINUED | OUTPATIENT
Start: 2022-11-19 | End: 2022-11-22 | Stop reason: HOSPADM

## 2022-11-19 RX ORDER — PREDNISONE 20 MG/1
40 TABLET ORAL
Status: DISCONTINUED | OUTPATIENT
Start: 2022-11-19 | End: 2022-11-21

## 2022-11-19 RX ORDER — FENTANYL CITRATE 50 UG/ML
INJECTION, SOLUTION INTRAMUSCULAR; INTRAVENOUS
Status: DISCONTINUED | OUTPATIENT
Start: 2022-11-19 | End: 2022-11-19 | Stop reason: HOSPADM

## 2022-11-19 RX ORDER — LEVOTHYROXINE SODIUM 0.1 MG/1
100 TABLET ORAL
Status: DISCONTINUED | OUTPATIENT
Start: 2022-11-19 | End: 2022-11-22 | Stop reason: HOSPADM

## 2022-11-19 RX ORDER — HYDROCODONE BITARTRATE AND ACETAMINOPHEN 5; 325 MG/1; MG/1
1 TABLET ORAL EVERY 6 HOURS PRN
Status: DISCONTINUED | OUTPATIENT
Start: 2022-11-19 | End: 2022-11-22 | Stop reason: HOSPADM

## 2022-11-19 RX ADMIN — CEFTRIAXONE 1 G: 1 INJECTION, POWDER, FOR SOLUTION INTRAMUSCULAR; INTRAVENOUS at 00:30

## 2022-11-19 RX ADMIN — BUSPIRONE HYDROCHLORIDE 5 MG: 5 TABLET ORAL at 08:35

## 2022-11-19 RX ADMIN — SODIUM CHLORIDE 125 ML/HR: 9 INJECTION, SOLUTION INTRAVENOUS at 12:53

## 2022-11-19 RX ADMIN — BUSPIRONE HYDROCHLORIDE 5 MG: 5 TABLET ORAL at 16:16

## 2022-11-19 RX ADMIN — PREDNISONE 40 MG: 20 TABLET ORAL at 08:35

## 2022-11-19 RX ADMIN — FLUOXETINE 20 MG: 20 CAPSULE ORAL at 08:35

## 2022-11-19 RX ADMIN — BUSPIRONE HYDROCHLORIDE 5 MG: 5 TABLET ORAL at 20:41

## 2022-11-19 RX ADMIN — LEVOTHYROXINE SODIUM 100 MCG: 0.1 TABLET ORAL at 08:33

## 2022-11-19 RX ADMIN — CEFTRIAXONE 1 G: 1 INJECTION, POWDER, FOR SOLUTION INTRAMUSCULAR; INTRAVENOUS at 22:31

## 2022-11-19 RX ADMIN — COLCHICINE 0.6 MG: 0.6 TABLET, FILM COATED ORAL at 20:42

## 2022-11-19 RX ADMIN — COLCHICINE 0.6 MG: 0.6 TABLET, FILM COATED ORAL at 08:35

## 2022-11-19 RX ADMIN — HYDROCODONE BITARTRATE AND ACETAMINOPHEN 1 TABLET: 5; 325 TABLET ORAL at 13:03

## 2022-11-19 NOTE — PROGRESS NOTES
[facility]    Inpatient Progress Note      Chief Complaint/Reason for service:    · Pericardial effusion         Subjective:       Acute episode of diaphoresis, dyspnea, nausea overnight.  Better this morning but still short of air.  BP requiring pressor.  No significant chest pain    Past medical, surgical, social and family history reviewed in the patient's electronic medical record.    Review of Systems:   Positive for dyspnea, fatigue, lethargy  Negative for chest pain, lower extremity edema, palpitations    Problem List  Active Hospital Problems    Diagnosis  POA   • **Shock (HCC) [R57.9]  Yes   • Pericarditis due to ? (R/O post-viral) [I30.0]  Yes   • RYLEY  [N17.9]  Yes   • Pericardial effusion [I31.39]  Yes   • R/O Sepsis associated hypotension (HCC) [A41.9, I95.9]  Yes   • Elevated LFTs (R/O hepatic congestion) [R79.89]  Yes      Resolved Hospital Problems   No resolved problems to display.            Objective:      Infusions:  phenylephrine, 0.5-3 mcg/kg/min, Last Rate: 0.5 mcg/kg/min (11/19/22 0305)         Medications:    Current Facility-Administered Medications:   •  acetaminophen (TYLENOL) tablet 650 mg, 650 mg, Oral, Q4H PRN, Luís Tran MD  •  busPIRone (BUSPAR) tablet 5 mg, 5 mg, Oral, TID, Meng Mathews MD, 5 mg at 11/19/22 0835  •  cefTRIAXone (ROCEPHIN) 1 g/100 mL 0.9% NS (MBP), 1 g, Intravenous, Q24H, Meng Mathews MD, 1 g at 11/19/22 0030  •  colchicine tablet 0.6 mg, 0.6 mg, Oral, Q12H, Meng Mathews MD, 0.6 mg at 11/19/22 0835  •  FLUoxetine (PROzac) capsule 20 mg, 20 mg, Oral, Daily, Meng Mathews MD, 20 mg at 11/19/22 0835  •  levothyroxine (SYNTHROID, LEVOTHROID) tablet 100 mcg, 100 mcg, Oral, Q AM, Meng Mathews MD, 100 mcg at 11/19/22 0833  •  phenylephrine (FRITZ-SYNEPHRINE) 50 mg in sodium chloride 0.9 % 250 mL infusion, 0.5-3 mcg/kg/min, Intravenous, Titrated, Concetta Case, TAMAR, Last Rate: 14.97  mL/hr at 11/19/22 0305, 0.5 mcg/kg/min at 11/19/22 0305  •  predniSONE (DELTASONE) tablet 40 mg, 40 mg, Oral, Daily With Breakfast, Meng Mathews MD, 40 mg at 11/19/22 0835  •  sodium chloride 0.9 % bolus 500 mL, 500 mL, Intravenous, Q1H PRN, Meng Mathews MD    Vital Sign Min/Max for last 24 hours  Temp  Min: 96.5 °F (35.8 °C)  Max: 97.9 °F (36.6 °C)   BP  Min: 85/64  Max: 134/96   Pulse  Min: 98  Max: 112   Resp  Min: 16  Max: 18   SpO2  Min: 91 %  Max: 97 %   No data recorded      Intake/Output Summary (Last 24 hours) at 11/19/2022 1051  Last data filed at 11/19/2022 0800  Gross per 24 hour   Intake 1962.3 ml   Output 250 ml   Net 1712.3 ml           CONSTITUTIONAL: No acute distress  CARDIOVASCULAR: Regular rate and rhythm, no murmur.  Left radial artery access site clean dry and intact    Labs/studies:  Available lab and imaging results were reviewed by myself today    Results for orders placed during the hospital encounter of 11/18/22    Adult Transthoracic Echo Complete w/ Color, Spectral and Contrast if Necessary Per Protocol    Interpretation Summary  •  Left ventricular systolic function is normal. Left ventricular ejection fraction appears to be 66 - 70%.  •  Left ventricular wall thickness is consistent with moderate concentric hypertrophy.  •  There is a moderate (1-2cm) circumferential pericardial effusion.  There are borderline findings suggestive of early cardiac tamponade physiology.      Tele: Sinus tachycardia           Assessment/Plan:       ASSESSMENT:  1. Recent respiratory illness  2. Chest pain with abnormal EKG  a. LHC without obstructive CAD  b. Findings consistent with pericarditis  3. Pericardial effusion, features consistent with cardiac tamponade on echo 11/19, progressed from echo performed on 11/18  4. Hypotension  5. RYLEY  6. Hypertension  7. Hypothyroidism    PLAN:  1. Recommend pericardiocentesis for enlarging pericardial effusion with now with worsening features  consistent with early cardiac tamponade.  Discussed risk versus benefit with patient and her .  They are agreeable to proceed.  2. Continue saline, Sidney-Synephrine      Luís Tran MD, MSc, FACC, [facility]  Interventional Cardiology  [facility]

## 2022-11-19 NOTE — H&P
[facility]   [address], [address]  [place], [place], [address] [phone]  WWW.[facility]           INPATIENT HISTORY AND PHYSICAL NOTE    Patient Care Team:  Patient Care Team:  Swapna Moulton APRN as PCP - General (Nurse Practitioner)    Chief complaint: Chest pain, respiratory distress         HPI:    Razia Sequeira is a 57 y.o. female.    Cardiac focused problem list:  1. Respiratory illness 11/2022  2. Concomitant chest pain 11/2022, ST elevation in the inferolateral leads, upsloping, possible pericarditis  3. Hypothyroidism  4. Hypertension    The patient had a respiratory illness a couple weeks ago, took about a week for her to recover.  This Monday she started to feel worse again.  Went to her PCP and tested negative for flu, RSV, COVID.  Developed acute onset severe shortness of air and presented to [facility].  Underwent work-up there, data deficit.  Developed chest discomfort while there and had an elevated troponin as well as an evolving EKG with upsloping ST elevation in the inferolateral leads; all concerning for an evolving MI.  Patient sent to [facility] for an urgent heart catheterization.  Of note, white count reportedly elevated at outlying facility    Lifelong non-smoker.  No prior cardiac testing.  Father with obstructive CAD diagnosed in his 80s    Review of Systems:  Positive for shortness of air, chest pain, fatigue  All other systems reviewed and are negative.    PFSH:  Patient Active Problem List   Diagnosis   • Depression   • Well-controlled hypertension   • Hypothyroidism   • Arthritis   • Anxiety   • Essential hypertension   • Decreased libido   • Numerous skin moles   • Low serum vitamin B12   • Reactive airway disease without complication       No current facility-administered medications on file prior to encounter.     Current Outpatient Medications on File Prior to Encounter   Medication Sig Dispense  Refill   • albuterol sulfate  (90 Base) MCG/ACT inhaler INHALE 2 PUFFS BY MOUTH EVERY 4 HOURS AS NEEDED FOR WHEEZING 18 g 0   • busPIRone (BUSPAR) 5 MG tablet Take 1 tablet by mouth 3 (Three) Times a Day. 90 tablet 3   • EQ Loratadine 10 MG tablet Take 1 tablet by mouth once daily 90 tablet 1   • estradiol (VIVELLE-DOT) 0.0375 MG/24HR patch Place 1 patch on the skin as directed by provider 2 (Two) Times a Week. 24 patch 3   • Flaxseed, Linseed, (FLAX SEEDS PO) Take  by mouth.     • FLUoxetine (PROzac) 20 MG capsule Take 1 capsule by mouth Daily. 90 capsule 3   • fluticasone (Flonase) 50 MCG/ACT nasal spray 1-2 sprays in each nostril daily 3 g 3   • levothyroxine (Euthyrox) 100 MCG tablet Take 1 tablet by mouth Daily. 90 tablet 3   • lisinopril-hydrochlorothiazide (PRINZIDE,ZESTORETIC) 20-12.5 MG per tablet Take 1 tablet by mouth Daily. 90 tablet 3   • Magnesium Chloride (MAGNESIUM DR PO) Take  by mouth.     • mometasone (ASMANEX TWISTHALER) inhaler 110 mcg/inhalation Inhale 2 puffs Daily. 2 each 0   • montelukast (Singulair) 10 MG tablet Take 1 tablet by mouth Every Night. 90 tablet 1   • Multiple Vitamins-Minerals (MULTIVITAMIN ADULT PO) Take  by mouth.     • Omega-3 Fatty Acids (fish oil) 1000 MG capsule capsule Take  by mouth Daily With Breakfast.     • vitamin C (ASCORBIC ACID) 250 MG tablet Take 250 mg by mouth Daily.     • Zinc Acetate, Oral, (ZINC ACETATE PO) Take  by mouth.       No Known Allergies    Social History     Socioeconomic History   • Marital status:    Tobacco Use   • Smoking status: Former     Packs/day: 0.25     Years: 2.00     Pack years: 0.50     Types: Cigarettes   • Smokeless tobacco: Never     Family History   Problem Relation Age of Onset   • Arthritis Other    • Depression Other    • Diabetes Other    • Stroke Other    • Thyroid disease Other    • Hyperlipidemia Other    • Ovarian cancer Maternal Grandmother         pt states 70's   • Breast cancer Paternal Aunt         pt  states 60's            Objective:     Vital Sign Min/Max for last 24 hours  No data recorded   BP  Min: 107/72  Max: 109/65   Pulse  Min: 101  Max: 109   Resp  Min: 16  Max: 18   SpO2  Min: 92 %  Max: 93 %   No data recorded    No intake or output data in the 24 hours ending 11/18/22 2100        Vitals:    11/18/22 2052   BP: 109/65   Pulse: 101   Resp: 16   SpO2: 92%       CONSTITUTIONAL: Well-nourished.  Lethargic  SKIN: Warm and dry. No rashes noted  HEENT: Head is normocephalic and atraumatic. Mucous membranes are pink and moist.   NECK: Supple without masses or thyromegaly.   LUNGS: Normal effort. Clear to auscultation bilaterally without wheezing, rhonchi, or rales noted.   CARDIOVASCULAR: Regular rate and rhythm with a normal S1 and S2. There is no murmur, gallop, rub, or click appreciated.   PERIPHERAL VASCULAR: Carotid upstroke is 2+ bilaterally and without bruits. Radial pulses are 2+ bilaterally. There is no lower extremity edema.  2+ DP pulses bilaterally  ABDOMEN: Normal bowel sounds.  Soft with no tenderness with palpitation. No hepatosplenomegaly  MUSCULOSKELETAL:  No digital cyanosis  NEUROLOGICAL: Nonfocal.  PSYCHIATRIC: Alert, orientated x 3, appropriate affect and mood    Lab results reviewed by myself:  No results found for: CKTOTAL, CKMB, CKMBINDEX, TROPONINI, TROPONINT    Lab Results   Component Value Date    CHOL 231 (H) 10/02/2017    CHOL 233 (H) 11/07/2016    CHOL 203 (H) 07/11/2016     Lab Results   Component Value Date    TRIG 98 05/13/2022    TRIG 189 (H) 04/20/2021    TRIG 138 02/07/2020     Lab Results   Component Value Date    HDL 56 05/13/2022    HDL 56 04/20/2021    HDL 52 02/07/2020     Lab Results   Component Value Date    .4 (H) 05/13/2022     No components found for: LDLDIRECTC    Diagnostic Data:    EKG: Sinus tachycardia, upsloping ST elevation in the inferolateral leads.  No demonstrable OK depression or OK elevation aVR    Tele: Sinus tachycardia         Assessment and  Plan:     Problem list:    * No active hospital problems. *      ASSESSMENT:  1. Respiratory illness  2. Hypotension  3. Possible sepsis  4. Chest pain with evolving EKG and elevated troponin  a. LHC negative for obstructive disease.  DAVID flow seems blunted diffusely concerning for potential heart failure  b. Possible pericarditis versus myocarditis versus other  i. Patient did note that her chest pain was more prominent when laying flat while at [place].  She had chest pain in route to the Cath Lab.  At the end of her heart cath, chest pain had improved  5. Elevated LVEDP 23 mmHg  a. Left ventriculogram not performed due to RYLEY  6. RYLEY  7. Hypertension  8. Hypothyroidism    PLAN:  1. Admission to ICU  2. Continue Sidney-Synephrine to maintain a systolic blood pressure of greater than 100 mmHg  3. Normal saline 150 cc an hour  4. Stat echo  5. Additional chest x-ray, EKG, labs ordered  6. Additional cardiac recommendations pending results of echocardiogram  7. Findings and plan discussed with intensivist, Dr. JUSTINO Tran MD, MSc, FACC, [facility]  Interventional Cardiology  [facility]      ADDENDUM  -Echo reviewed.  Moderate to large pericardial effusion, circumferential, borderline tamponade physiology  -Patient breathing comfortably at rest on room air, not tachypneic.  Borderline blood pressure on Sidney-Synephrine.  Chest pain-free  -Starting prednisone and colchicine.  Not a good candidate for high-dose nonsteroidals due to renal insufficiency  -Continue normal saline 125 cc/h  -Repeat echocardiogram tomorrow morning  -N.p.o. after midnight for possible pericardiocentesis tomorrow  -Discussed findings and plan with the patient, the patient's nurse, and Dr. Reid Tran MD, MSc, FACC, [facility]  Interventional Cardiology  [facility]

## 2022-11-19 NOTE — PROGRESS NOTES
Intensive Care Follow-up      LOS: 0 days     Ms. Razia Sequeira, 57 y.o. female is followed for: Shock (HCC)     Subjective - Interval History     Razia Sequeira is a 57 year-old female with PMH of HTN, asthma, hypothyroidism, allergies, endometriosis, and anxiety that is being admitted to the ICU on 11/18 with chest discomfort and shortness of air.    Ms. Sequeira has been feeling unwell for a couple of weeks. She had a respiratory illness a couple of weeks ago and this Monday, she started feeling worse again. Today, 11/18, she was able to see her PCP for evaluation who sent her to [facility] due to the severity of her SOA. While there, she began experiencing chest discomfort, worse when laying flat, and EKG was noted to have ST elevation in the inferolateral leads with elevated troponin, concerning for an MI. She was transferred to [facility] for a heart cath with Dr. Tran.     [facility] negative for obstructive disease and her CP improved afterwards. LVEDP was elevated at 23, however, left ventriculogram was not done due to her elevated creatinine.     Labs done here show WBC 25.06, Na 131, proBNP 941, BUN/Cr. 18/1.53, ALT/AST 94/95, & total bili 1.7. X-ray negative for acute processes. She will be admitted to the ICU for further workup and higher level of care.    Time spent: 10 minutes  Electronically signed by TAMAR King, 11/18/22, 9:32 PM EST.    Patient is a non-smoker  History of asthma  Unvaccinated against COVID-19  Indicates that she has had 2 COVID test today negative  Recent outpatient amoxicillin use    The patient's relevant past medical, surgical and social history were reviewed and updated in Epic as appropriate.     Objective     Infusions:  sodium chloride, 125 mL/hr, Last Rate: 125 mL/hr (11/18/22 2444)      Medications:  cefTRIAXone, 1 g, Intravenous, Q24H        Vital Sign Min/Max for last 24 hours  No data recorded   BP  Min: 95/80  Max: 109/65   Pulse  Min: 101   "Max: 109   Resp  Min: 16  Max: 18   SpO2  Min: 92 %  Max: 96 %   No data recorded    No intake or output data in the 24 hours ending 11/18/22 3641        Physical Exam:   GENERAL: Awake, no distress   HEENT: No adenopathy or thyromegaly   LUNGS: Clear no wheezes or rhonchi   HEART: Regular rate and rhythm without murmur   ABDOMEN: Soft, nontender   EXTREMITIES: No clubbing, cyanosis, or edema   NEURO/PSYCH: Awake and alert.  Appropriate.    Results from last 7 days   Lab Units 11/18/22  2100   WBC 10*3/mm3 25.06*   HEMOGLOBIN g/dL 14.4   PLATELETS 10*3/mm3 384     Results from last 7 days   Lab Units 11/18/22  2100   SODIUM mmol/L 131*   POTASSIUM mmol/L 4.0   CO2 mmol/L 16.0*   BUN mg/dL 18   CREATININE mg/dL 1.53*   MAGNESIUM mg/dL 2.0   GLUCOSE mg/dL 166*     Estimated Creatinine Clearance: 47.2 mL/min (A) (by C-G formula based on SCr of 1.53 mg/dL (H)).              No results found for: LACTATE       Images: Chest x-ray reveals no effusions, infiltrates, or consolidation.  Cardiac silhouette is enlarged and somewhat of a \"bottle\" shaped    I reviewed the patient's results and images.     Impression      Active Hospital Problems    Diagnosis    • **Shock (HCC)    • RYLEY     • Pericardial effusion    • R/O Sepsis associated hypotension (HCC)    • Elevated LFTs (R/O hepatic congestion)             Plan        She appears to have a pericardial effusion, etiology unclear.  This may be a post viral pericarditis.  TSH normal.  No history of malignancy.  No history of autoimmune disease.    Admit to the intensive care unit  Culture  Obtain labs including coxsackie viral titers, autoimmune labs, procalcitonin, Q-Gold, fungal serologies  Broad-spectrum empiric antibiotics  Try volume resuscitation  May require pericardiocentesis if she becomes hemodynamically compromised  Consider colchicine and steroids  Would avoid nonsteroidal anti-inflammatories due to elevated creatinine     I discussed the patient's findings and my " recommendations with patient, family and nursing staff     Critical Care time spent in direct patient care: 32 minutes (excluding procedure time, if applicable) including high complexity decision making to assess, manipulate, and support vital organ system failure in this individual who has impairment of one or more vital organ systems such that there is a high probability of imminent or life threatening deterioration in the patient’s condition.    REBECA Mathews MD  Pulmonary and Critical Care Medicine  11/18/22 22:11 EST

## 2022-11-19 NOTE — PROGRESS NOTES
Intensive Care Follow-up      LOS: 1 day     Ms. Razia Sequeira, 57 y.o. female is followed for: Glycemic, Electrolyte, Respiratory, and Medical management     Subjective - Interval History     Razia Sequeira is a 57 year-old female with PMH of HTN, asthma, hypothyroidism, allergies, endometriosis, and anxiety that is being admitted to the ICU on 11/18 with chest discomfort and shortness of air.     Ms. Sequeira has been feeling unwell for a couple of weeks. She had a respiratory illness a couple of weeks ago and this Monday, she started feeling worse again. Today, 11/18, she was able to see her PCP for evaluation who sent her to [facility] due to the severity of her SOA. While there, she began experiencing chest discomfort, worse when laying flat, and EKG was noted to have ST elevation in the inferolateral leads with elevated troponin, concerning for an MI. She was transferred to [facility] for a heart cath with Dr. Tran.      C negative for obstructive disease and her CP improved afterwards. LVEDP was elevated at 23, however, left ventriculogram was not done due to her elevated creatinine.      Transthoracic echocardiogram revealed a moderate pericardial effusion with early evidence of impaired filling.  Patient was started on empiric broad-spectrum antibiotics as well as steroids and colchicine for presumed pericarditis.    Interval history    She had an episode of diaphoresis this morning with worsening dyspnea  Somewhat improved  Continues to oxygenate adequately on room air  Remains hypotensive requiring Sidney-Synephrine for blood pressure support    The patient's relevant past medical, surgical and social history were reviewed and updated in Epic as appropriate.     Objective     Infusions:  phenylephrine, 0.5-3 mcg/kg/min, Last Rate: 0.5 mcg/kg/min (11/19/22 0305)      Medications:  busPIRone, 5 mg, Oral, TID  cefTRIAXone, 1 g, Intravenous, Q24H  colchicine, 0.6 mg, Oral, Q12H  FLUoxetine,  20 mg, Oral, Daily  levothyroxine, 100 mcg, Oral, Daily  predniSONE, 40 mg, Oral, Daily With Breakfast      Intake/Output       11/18/22 0700 - 11/19/22 0659    Intake (ml) 1617.2    Output (ml) 250    Net (ml) 1367.2    Last Weight 99.8 kg (220 lb)        Vital Sign Min/Max for last 24 hours  Temp  Min: 96.8 °F (36 °C)  Max: 97.9 °F (36.6 °C)   BP  Min: 85/64  Max: 134/96   Pulse  Min: 98  Max: 109   Resp  Min: 16  Max: 18   SpO2  Min: 91 %  Max: 97 %   No data recorded        Physical Exam:   GENERAL: Awake, no overt distress   HEENT: No adenopathy or thyromegaly   LUNGS: No wheezes or rhonchi   HEART: Regular rate and rhythm without murmurs   GI: Soft, nontender   EXTREMITIES: No clubbing, cyanosis or edema.  Trace lower extremity edema   NEURO/PSYCH: Awake and alert.  Follows commands    Results from last 7 days   Lab Units 11/18/22  2355 11/18/22  2100   WBC 10*3/mm3 25.05* 25.06*   HEMOGLOBIN g/dL 14.7 14.4   PLATELETS 10*3/mm3 449 384     Results from last 7 days   Lab Units 11/19/22  0032 11/18/22  2100   SODIUM mmol/L 135* 131*   POTASSIUM mmol/L 4.7 4.0   CO2 mmol/L 18.0* 16.0*   BUN mg/dL 21* 18   CREATININE mg/dL 1.50* 1.53*   MAGNESIUM mg/dL  --  2.0   GLUCOSE mg/dL 147* 166*     Estimated Creatinine Clearance: 48.1 mL/min (A) (by C-G formula based on SCr of 1.5 mg/dL (H)).    Results from last 7 days   Lab Units 11/18/22  2355   HEMOGLOBIN A1C % 5.90*           Lab Results   Component Value Date    LACTATE 4.7 (C) 11/19/2022          Images: Chest x-ray with cardiomegaly but no consolidation or effusions    I reviewed the patient's results and images.     Impression      Active Hospital Problems    Diagnosis    • **Shock (HCC)    • Pericarditis due to ? (R/O post-viral)    • RYLEY     • Pericardial effusion    • R/O Sepsis associated hypotension (HCC)    • Elevated LFTs (R/O hepatic congestion)          Plan        Continue prednisone and colchicine  Avoiding nonsteroidal anti-inflammatories due to  RYLEY  Follow-up labs for pericarditis  Follow-up cultures  Continue empiric broad-spectrum antibiotics for the time being  Continue to follow closely clinically, may require pericardiocentesis     Plan of care and goals reviewed with Multidisciplinary Team and Antibiotic Stewardship rounds   I discussed the patient's findings and my recommendations with patient and nursing staff      High level of risk due to:  illness with threat to life or bodily function and drugs requiring intensive monitoring for toxicity.     REBECA Mathews MD  Pulmonary and Critical Care Medicine

## 2022-11-19 NOTE — PLAN OF CARE
Goal Outcome Evaluation:  Plan of Care Reviewed With: patient           Outcome Evaluation: Patient had pericardiocentesis today, 500 mls out immediately after procedure. Hemovac drain in place. Sidney off since this morning, VSS. Pain controlled with PRN norco. Adequate UOP. Will continue to monitor.

## 2022-11-19 NOTE — NURSING NOTE
Patient called out. Patient sitting up in bed complaining of SOA, Patient diaphoretic and tachycardic. Patient denies chest pain. Patient's is pale and states she feels like she is going to vomit. RN spoke to cardiology PA, and then Marc CISNEROS. Per Marc CISNEROS continue to monitor at this point. Reid CISNEROS also at bedside. Awaiting morning Echo.     Patient now resting comfortably in bed. Nausea resolved and patient is less diaphoretic. RN continuing to monitor BP closely.

## 2022-11-19 NOTE — POST-PROCEDURE NOTE
Brief postprocedural note  -Successful pericardiocentesis with removal of approximately 500 cc of cloudy straw-colored fluid.  Fluid sent off for testing  -Drain in place, will keep in place at least until Monday.  Monitor output  -Plan repeat limited echocardiogram on Monday    Luís Tran MD, MSc, FACC, [facility]  Interventional Cardiology  [facility]

## 2022-11-19 NOTE — PLAN OF CARE
Problem: Adult Inpatient Plan of Care  Goal: Plan of Care Review  Outcome: Ongoing, Progressing  Flowsheets (Taken 11/19/2022 9535)  Progress: no change  Plan of Care Reviewed With: patient  Outcome Evaluation: Patient arrived on unit post cardiac cath tonight. Patient denies chest pain and states she is feeling better. TR band removed during the night per protocol. Labs collected. Some results still pending. Echo completed at bedside tonight. Plan is for patient to have repeat echo done this morning. Patient has been NPO since midnight for possible surgical intervention for pericardial effusion. Patient has remained afebrile tonight however patient has been occasionally diaphoretic. Patient denies chest pain. NS continue at 125ml/hr. Continue with close monitoring. Continue with current plan of care.

## 2022-11-20 ENCOUNTER — APPOINTMENT (OUTPATIENT)
Dept: GENERAL RADIOLOGY | Facility: HOSPITAL | Age: 57
End: 2022-11-20

## 2022-11-20 LAB
ALBUMIN SERPL-MCNC: 3.1 G/DL (ref 3.5–5.2)
ALBUMIN/GLOB SERPL: 0.9 G/DL
ALP SERPL-CCNC: 81 U/L (ref 39–117)
ALT SERPL W P-5'-P-CCNC: 3257 U/L (ref 1–33)
ANION GAP SERPL CALCULATED.3IONS-SCNC: 10 MMOL/L (ref 5–15)
AST SERPL-CCNC: 2983 U/L (ref 1–32)
BH CV VAS BP LEFT ARM: NORMAL MMHG
BILIRUB SERPL-MCNC: 0.7 MG/DL (ref 0–1.2)
BUN SERPL-MCNC: 22 MG/DL (ref 6–20)
BUN/CREAT SERPL: 24.7 (ref 7–25)
CALCIUM SPEC-SCNC: 8.4 MG/DL (ref 8.6–10.5)
CHLORIDE SERPL-SCNC: 105 MMOL/L (ref 98–107)
CO2 SERPL-SCNC: 21 MMOL/L (ref 22–29)
CREAT SERPL-MCNC: 0.89 MG/DL (ref 0.57–1)
D-LACTATE SERPL-SCNC: 1.2 MMOL/L (ref 0.5–2)
DEPRECATED RDW RBC AUTO: 44.1 FL (ref 37–54)
EGFRCR SERPLBLD CKD-EPI 2021: 74.8 ML/MIN/1.73
ERYTHROCYTE [DISTWIDTH] IN BLOOD BY AUTOMATED COUNT: 13.8 % (ref 12.3–15.4)
GLOBULIN UR ELPH-MCNC: 3.5 GM/DL
GLUCOSE SERPL-MCNC: 111 MG/DL (ref 65–99)
HCT VFR BLD AUTO: 39 % (ref 34–46.6)
HGB BLD-MCNC: 12.8 G/DL (ref 12–15.9)
INR PPP: 1.64 (ref 0.84–1.13)
MAXIMAL PREDICTED HEART RATE: 161 BPM
MAXIMAL PREDICTED HEART RATE: 161 BPM
MCH RBC QN AUTO: 28.8 PG (ref 26.6–33)
MCHC RBC AUTO-ENTMCNC: 32.8 G/DL (ref 31.5–35.7)
MCV RBC AUTO: 87.6 FL (ref 79–97)
PLATELET # BLD AUTO: 201 10*3/MM3 (ref 140–450)
PMV BLD AUTO: 12.1 FL (ref 6–12)
POTASSIUM SERPL-SCNC: 4.1 MMOL/L (ref 3.5–5.2)
PROCALCITONIN SERPL-MCNC: 0.35 NG/ML (ref 0–0.25)
PROT SERPL-MCNC: 6.6 G/DL (ref 6–8.5)
PROTHROMBIN TIME: 19.4 SECONDS (ref 11.4–14.4)
QT INTERVAL: 366 MS
QT INTERVAL: 368 MS
QT INTERVAL: 380 MS
QTC INTERVAL: 474 MS
QTC INTERVAL: 479 MS
QTC INTERVAL: 495 MS
RBC # BLD AUTO: 4.45 10*6/MM3 (ref 3.77–5.28)
SODIUM SERPL-SCNC: 136 MMOL/L (ref 136–145)
STRESS TARGET HR: 137 BPM
STRESS TARGET HR: 137 BPM
WBC NRBC COR # BLD: 16.36 10*3/MM3 (ref 3.4–10.8)

## 2022-11-20 PROCEDURE — 25010000002 CEFTRIAXONE PER 250 MG: Performed by: INTERNAL MEDICINE

## 2022-11-20 PROCEDURE — 71045 X-RAY EXAM CHEST 1 VIEW: CPT

## 2022-11-20 PROCEDURE — 84145 PROCALCITONIN (PCT): CPT | Performed by: INTERNAL MEDICINE

## 2022-11-20 PROCEDURE — 85027 COMPLETE CBC AUTOMATED: CPT | Performed by: INTERNAL MEDICINE

## 2022-11-20 PROCEDURE — 99233 SBSQ HOSP IP/OBS HIGH 50: CPT | Performed by: INTERNAL MEDICINE

## 2022-11-20 PROCEDURE — 85610 PROTHROMBIN TIME: CPT

## 2022-11-20 PROCEDURE — 83605 ASSAY OF LACTIC ACID: CPT | Performed by: INTERNAL MEDICINE

## 2022-11-20 PROCEDURE — 80053 COMPREHEN METABOLIC PANEL: CPT | Performed by: INTERNAL MEDICINE

## 2022-11-20 PROCEDURE — 63710000001 PREDNISONE PER 1 MG: Performed by: INTERNAL MEDICINE

## 2022-11-20 PROCEDURE — 99232 SBSQ HOSP IP/OBS MODERATE 35: CPT | Performed by: INTERNAL MEDICINE

## 2022-11-20 RX ORDER — LISINOPRIL 10 MG/1
10 TABLET ORAL
Status: DISCONTINUED | OUTPATIENT
Start: 2022-11-20 | End: 2022-11-21

## 2022-11-20 RX ADMIN — LEVOTHYROXINE SODIUM 100 MCG: 0.1 TABLET ORAL at 06:47

## 2022-11-20 RX ADMIN — LISINOPRIL 10 MG: 10 TABLET ORAL at 10:50

## 2022-11-20 RX ADMIN — PREDNISONE 40 MG: 20 TABLET ORAL at 08:00

## 2022-11-20 RX ADMIN — COLCHICINE 0.6 MG: 0.6 TABLET, FILM COATED ORAL at 08:00

## 2022-11-20 RX ADMIN — BUSPIRONE HYDROCHLORIDE 5 MG: 5 TABLET ORAL at 08:00

## 2022-11-20 RX ADMIN — BUSPIRONE HYDROCHLORIDE 5 MG: 5 TABLET ORAL at 16:08

## 2022-11-20 RX ADMIN — CEFTRIAXONE 1 G: 1 INJECTION, POWDER, FOR SOLUTION INTRAMUSCULAR; INTRAVENOUS at 23:47

## 2022-11-20 RX ADMIN — FLUOXETINE 20 MG: 20 CAPSULE ORAL at 08:00

## 2022-11-20 RX ADMIN — COLCHICINE 0.6 MG: 0.6 TABLET, FILM COATED ORAL at 20:54

## 2022-11-20 RX ADMIN — BUSPIRONE HYDROCHLORIDE 5 MG: 5 TABLET ORAL at 20:54

## 2022-11-20 NOTE — PLAN OF CARE
Problem: Adult Inpatient Plan of Care  Goal: Plan of Care Review  Outcome: Ongoing, Progressing  Flowsheets (Taken 11/20/2022 2809)  Progress: improving  Plan of Care Reviewed With: patient  Outcome Evaluation: Patient rested well tonight. Patient denies pain. Patient using bedside commode well but remains on bedrest otherwise. Patient remains in NSR and occasional ST with movement. Patient's pericardial drain has put out 75ml tonight. Patient verbalizes that she feels much better. Vital signs remain stable. Patient remains A&Ox4. Patient remains afebrile at this time. Awaiting morning labs. Continue to monitor closely, continue with current plan of care.

## 2022-11-20 NOTE — PROGRESS NOTES
Intensive Care Follow-up      LOS: 2 days     Ms. Razia Sequeira, 57 y.o. female is followed for: Glycemic, Electrolyte, Respiratory, and Medical management     Subjective - Interval History     Razia Sequeira is a 57 year-old female with PMH of HTN, asthma, hypothyroidism, allergies, endometriosis, and anxiety that is being admitted to the ICU on 11/18 with chest discomfort and shortness of air.     Ms. Sequeira has been feeling unwell for a couple of weeks. She had a respiratory illness a couple of weeks ago and this Monday, she started feeling worse again. Today, 11/18, she was able to see her PCP for evaluation who sent her to [facility] due to the severity of her SOA. While there, she began experiencing chest discomfort, worse when laying flat, and EKG was noted to have ST elevation in the inferolateral leads with elevated troponin, concerning for an MI. She was transferred to [facility] for a heart cath with Dr. Tran.      C negative for obstructive disease and her CP improved afterwards. LVEDP was elevated at 23, however, left ventriculogram was not done due to her elevated creatinine.      Transthoracic echocardiogram revealed a moderate pericardial effusion with early evidence of impaired filling.  Patient was started on empiric broad-spectrum antibiotics as well as steroids and colchicine for presumed pericarditis.    Due to persistent hypotension the patient underwent a pericardiocentesis and drain placement yesterday.  Initially 500 mL returned.  High WBC with neutrophils on differential and cell count.  Cultures negative so far     Interval history     Sindey-Synephrine has been weaned off  She had about 75 mL out her drain overnight  Complaining of some left shoulder discomfort  Oxygenating adequately on room air    The patient's relevant past medical, surgical and social history were reviewed and updated in Epic as appropriate.     Objective     Infusions:  phenylephrine, 0.5-3  mcg/kg/min, Last Rate: Stopped (11/19/22 1102)      Medications:  busPIRone, 5 mg, Oral, TID  cefTRIAXone, 1 g, Intravenous, Q24H  colchicine, 0.6 mg, Oral, Q12H  FLUoxetine, 20 mg, Oral, Daily  levothyroxine, 100 mcg, Oral, Q AM  predniSONE, 40 mg, Oral, Daily With Breakfast      Intake/Output       11/19/22 0700 - 11/20/22 0659    Intake (ml) 2155.9    Output (ml) 1925    Net (ml) 230.9    Last Weight 99.8 kg (220 lb 0.3 oz)        Vital Sign Min/Max for last 24 hours  Temp  Min: 96.5 °F (35.8 °C)  Max: 98.3 °F (36.8 °C)   BP  Min: 98/86  Max: 156/103   Pulse  Min: 93  Max: 116   Resp  Min: 16  Max: 20   SpO2  Min: 91 %  Max: 97 %   No data recorded        Physical Exam:   GENERAL: Awake, no distress   HEENT: No adenopathy or thyromegaly   LUNGS: No wheezes or rhonchi   HEART: Regular rate and rhythm no murmurs   GI: Soft, nontender   EXTREMITIES: No clubbing or cyanosis and trace lower extremity edema   NEURO/PSYCH: Awake, alert, appropriate    Results from last 7 days   Lab Units 11/20/22  0336 11/18/22  2355 11/18/22  2100   WBC 10*3/mm3 16.36* 25.05* 25.06*   HEMOGLOBIN g/dL 12.8 14.7 14.4   PLATELETS 10*3/mm3 201 449 384     Results from last 7 days   Lab Units 11/20/22  0336 11/19/22  0032 11/18/22  2100   SODIUM mmol/L 136 135* 131*   POTASSIUM mmol/L 4.1 4.7 4.0   CO2 mmol/L 21.0* 18.0* 16.0*   BUN mg/dL 22* 21* 18   CREATININE mg/dL 0.89 1.50* 1.53*   MAGNESIUM mg/dL  --   --  2.0   GLUCOSE mg/dL 111* 147* 166*     Estimated Creatinine Clearance: 81.1 mL/min (by C-G formula based on SCr of 0.89 mg/dL).    Results from last 7 days   Lab Units 11/18/22  2355   HEMOGLOBIN A1C % 5.90*           Lab Results   Component Value Date    LACTATE 1.2 11/20/2022          Images: Chest x-ray this morning reveals no consolidation or effusions    I reviewed the patient's results and images.     Impression      Active Hospital Problems    Diagnosis    • **Shock (HCC)    • Pericarditis due to ? (R/O post-viral)    • RYLEY      • Pericardial effusion    • R/O Sepsis associated hypotension (HCC)    • Elevated LFTs (R/O hepatic congestion)          Plan        Continue prednisone and colchicine  Follow-up cultures  Continue empiric antibiotics given neutrophil predominance and pericardial fluid  Follow-up on outstanding labs  Pericardial drain management per Dr. Tran  Elevated LFTs most likely shock liver although we will check a hepatitis panel and EBV panel     Plan of care and goals reviewed with Multidisciplinary Team and Antibiotic Stewardship rounds   I discussed the patient's findings and my recommendations with patient and nursing staff      High level of risk due to:  illness with threat to life or bodily function and drugs requiring intensive monitoring for toxicity.     REBECA Mathews MD  Pulmonary and Critical Care Medicine

## 2022-11-20 NOTE — PROGRESS NOTES
[facility] Cardiology    Inpatient Progress Note      Chief Complaint/Reason for service:    · Pericardial effusion         Subjective:       Some left-sided back pain, worse when lying flat.  Otherwise breathing much more comfortably.  In better spirits.  Denies shortness of air or palpitations at rest.  75 cc out from drain since yesterday afternoon    Past medical, surgical, social and family history reviewed in the patient's electronic medical record.    Review of Systems:   Positive for back discomfort  Negative for dyspnea at rest, lower extremity edema, palpitations    Problem List  Active Hospital Problems    Diagnosis  POA   • **Shock (HCC) [R57.9]  Yes   • Pericarditis due to ? (R/O post-viral) [I30.0]  Yes   • RYLEY  [N17.9]  Yes   • Pericardial effusion [I31.39]  Yes   • R/O Sepsis associated hypotension (HCC) [A41.9, I95.9]  Yes   • Elevated LFTs (R/O hepatic congestion) [R79.89]  Yes      Resolved Hospital Problems   No resolved problems to display.            Objective:      Infusions:  phenylephrine, 0.5-3 mcg/kg/min, Last Rate: Stopped (11/19/22 1102)         Medications:    Current Facility-Administered Medications:   •  acetaminophen (TYLENOL) tablet 650 mg, 650 mg, Oral, Q4H PRN, Luís Tran MD  •  busPIRone (BUSPAR) tablet 5 mg, 5 mg, Oral, TID, Luís Tran MD, 5 mg at 11/20/22 0800  •  cefTRIAXone (ROCEPHIN) 1 g/100 mL 0.9% NS (MBP), 1 g, Intravenous, Q24H, Luís Tran MD, 1 g at 11/19/22 2231  •  colchicine tablet 0.6 mg, 0.6 mg, Oral, Q12H, Luís Tran MD, 0.6 mg at 11/20/22 0800  •  FLUoxetine (PROzac) capsule 20 mg, 20 mg, Oral, Daily, Luís Tran MD, 20 mg at 11/20/22 0800  •  HYDROcodone-acetaminophen (NORCO) 5-325 MG per tablet 1 tablet, 1 tablet, Oral, Q6H PRN, Meredith Nolen APRN, 1 tablet at 11/19/22 1303  •  levothyroxine (SYNTHROID, LEVOTHROID) tablet 100 mcg, 100 mcg, Oral, Q AM, Luís Tran MD, 100 mcg at 11/20/22 0647  •   phenylephrine (FRITZ-SYNEPHRINE) 50 mg in sodium chloride 0.9 % 250 mL infusion, 0.5-3 mcg/kg/min, Intravenous, Titrated, Luís Tran MD, Held at 11/19/22 1102  •  predniSONE (DELTASONE) tablet 40 mg, 40 mg, Oral, Daily With Breakfast, Luís Tran MD, 40 mg at 11/20/22 0800  •  sodium chloride 0.9 % bolus 500 mL, 500 mL, Intravenous, Q1H PRN, Luís Tran MD    Vital Sign Min/Max for last 24 hours  Temp  Min: 97.7 °F (36.5 °C)  Max: 98.5 °F (36.9 °C)   BP  Min: 102/85  Max: 156/103   Pulse  Min: 92  Max: 116   Resp  Min: 16  Max: 20   SpO2  Min: 91 %  Max: 97 %   No data recorded      Intake/Output Summary (Last 24 hours) at 11/20/2022 0921  Last data filed at 11/20/2022 0800  Gross per 24 hour   Intake 2258.66 ml   Output 1925 ml   Net 333.66 ml           CONSTITUTIONAL: No acute distress  CARDIOVASCULAR: Regular rate and rhythm, no murmur.  Pericardial drain site clean dry and intact.  Continues to drain    Labs/studies:  Available lab and imaging results were reviewed by myself today    Results for orders placed during the hospital encounter of 11/18/22    Adult Transthoracic Echo Complete w/ Color, Spectral and Contrast if Necessary Per Protocol    Interpretation Summary  •  Left ventricular systolic function is normal. Left ventricular ejection fraction appears to be 66 - 70%.  •  Left ventricular wall thickness is consistent with moderate concentric hypertrophy.  •  There is a moderate (1-2cm) circumferential pericardial effusion.  There are borderline findings suggestive of early cardiac tamponade physiology.      Tele: Sinus rhythm           Assessment/Plan:       ASSESSMENT:  1. Recent respiratory illness  2. Chest pain with abnormal EKG  a. LHC without obstructive CAD  b. Findings consistent with pericarditis  3. Pericardial effusion, features consistent with cardiac tamponade on echo 11/19, progressed from echo performed on 11/18  4. Hypotension  5. RYLEY  a. Improved after  pericardiocentesis  6. Hypertension  7. Hypothyroidism  8. Elevated LFTs  a. Shock liver    PLAN:  1. Keep pericardial drain in place until drainage is minimal, less than 25 cc in a day  2. Restarting lisinopril, monitor renal function  3. Daily LFTs, serology testing as ordered by the intensivist team  4. Okay to transfer to telemetry from a cardiac standpoint      Luís Tran MD, MSc, FACC, [facility]  Interventional Cardiology  [facility]

## 2022-11-21 ENCOUNTER — APPOINTMENT (OUTPATIENT)
Dept: GENERAL RADIOLOGY | Facility: HOSPITAL | Age: 57
End: 2022-11-21

## 2022-11-21 PROBLEM — A41.9 SEPSIS ASSOCIATED HYPOTENSION (HCC): Status: RESOLVED | Noted: 2022-11-18 | Resolved: 2022-11-21

## 2022-11-21 PROBLEM — R57.9 SHOCK: Status: RESOLVED | Noted: 2022-11-18 | Resolved: 2022-11-21

## 2022-11-21 PROBLEM — N17.9 AKI (ACUTE KIDNEY INJURY): Status: RESOLVED | Noted: 2022-11-18 | Resolved: 2022-11-21

## 2022-11-21 PROBLEM — I95.9 SEPSIS ASSOCIATED HYPOTENSION (HCC): Status: RESOLVED | Noted: 2022-11-18 | Resolved: 2022-11-21

## 2022-11-21 LAB
ALBUMIN SERPL-MCNC: 3 G/DL (ref 3.5–5.2)
ALBUMIN/GLOB SERPL: 0.8 G/DL
ALP SERPL-CCNC: 80 U/L (ref 39–117)
ALT SERPL W P-5'-P-CCNC: 1995 U/L (ref 1–33)
ANION GAP SERPL CALCULATED.3IONS-SCNC: 10 MMOL/L (ref 5–15)
AST SERPL-CCNC: 798 U/L (ref 1–32)
BILIRUB SERPL-MCNC: 0.5 MG/DL (ref 0–1.2)
BUN SERPL-MCNC: 16 MG/DL (ref 6–20)
BUN/CREAT SERPL: 20.5 (ref 7–25)
CALCIUM SPEC-SCNC: 8.5 MG/DL (ref 8.6–10.5)
CENTROMERE B AB SER-ACNC: <0.2 AI (ref 0–0.9)
CHLORIDE SERPL-SCNC: 105 MMOL/L (ref 98–107)
CHROMATIN AB SERPL-ACNC: <0.2 AI (ref 0–0.9)
CO2 SERPL-SCNC: 21 MMOL/L (ref 22–29)
CREAT BLDA-MCNC: 1.7 MG/DL (ref 0.6–1.3)
CREAT SERPL-MCNC: 0.78 MG/DL (ref 0.57–1)
CV A16 IGG TITR SER IF: ABNORMAL TITER
CV A16 IGM TITR SER IF: NEGATIVE TITER
CV A24 IGG TITR SER IF: ABNORMAL TITER
CV A24 IGM TITR SER IF: NEGATIVE TITER
CV A7 IGG TITR SER IF: NEGATIVE TITER
CV A7 IGM TITR SER IF: NEGATIVE TITER
CV A9 IGG TITR SER IF: ABNORMAL TITER
CV A9 IGM TITR SER IF: NEGATIVE TITER
DEPRECATED RDW RBC AUTO: 45.4 FL (ref 37–54)
DSDNA AB SER-ACNC: 3 IU/ML (ref 0–9)
EGFRCR SERPLBLD CKD-EPI 2021: 87.6 ML/MIN/1.73
ENA JO1 AB SER-ACNC: <0.2 AI (ref 0–0.9)
ENA RNP AB SER-ACNC: 0.3 AI (ref 0–0.9)
ENA SCL70 AB SER-ACNC: <0.2 AI (ref 0–0.9)
ENA SM AB SER-ACNC: <0.2 AI (ref 0–0.9)
ENA SS-A AB SER-ACNC: <0.2 AI (ref 0–0.9)
ENA SS-B AB SER-ACNC: <0.2 AI (ref 0–0.9)
ERYTHROCYTE [DISTWIDTH] IN BLOOD BY AUTOMATED COUNT: 13.8 % (ref 12.3–15.4)
GLOBULIN UR ELPH-MCNC: 3.8 GM/DL
GLUCOSE SERPL-MCNC: 83 MG/DL (ref 65–99)
HCT VFR BLD AUTO: 39.6 % (ref 34–46.6)
HGB BLD-MCNC: 12.6 G/DL (ref 12–15.9)
Lab: NORMAL
MCH RBC QN AUTO: 28.9 PG (ref 26.6–33)
MCHC RBC AUTO-ENTMCNC: 31.8 G/DL (ref 31.5–35.7)
MCV RBC AUTO: 90.8 FL (ref 79–97)
PLATELET # BLD AUTO: 195 10*3/MM3 (ref 140–450)
PMV BLD AUTO: 11.7 FL (ref 6–12)
POTASSIUM SERPL-SCNC: 3.7 MMOL/L (ref 3.5–5.2)
PROT SERPL-MCNC: 6.8 G/DL (ref 6–8.5)
RBC # BLD AUTO: 4.36 10*6/MM3 (ref 3.77–5.28)
SODIUM SERPL-SCNC: 136 MMOL/L (ref 136–145)
WBC NRBC COR # BLD: 11.35 10*3/MM3 (ref 3.4–10.8)

## 2022-11-21 PROCEDURE — 86665 EPSTEIN-BARR CAPSID VCA: CPT | Performed by: INTERNAL MEDICINE

## 2022-11-21 PROCEDURE — 85027 COMPLETE CBC AUTOMATED: CPT | Performed by: INTERNAL MEDICINE

## 2022-11-21 PROCEDURE — 86708 HEPATITIS A ANTIBODY: CPT | Performed by: INTERNAL MEDICINE

## 2022-11-21 PROCEDURE — 86704 HEP B CORE ANTIBODY TOTAL: CPT | Performed by: INTERNAL MEDICINE

## 2022-11-21 PROCEDURE — 87340 HEPATITIS B SURFACE AG IA: CPT | Performed by: INTERNAL MEDICINE

## 2022-11-21 PROCEDURE — 99232 SBSQ HOSP IP/OBS MODERATE 35: CPT | Performed by: INTERNAL MEDICINE

## 2022-11-21 PROCEDURE — 86705 HEP B CORE ANTIBODY IGM: CPT | Performed by: INTERNAL MEDICINE

## 2022-11-21 PROCEDURE — 86706 HEP B SURFACE ANTIBODY: CPT | Performed by: INTERNAL MEDICINE

## 2022-11-21 PROCEDURE — 63710000001 PREDNISONE PER 1 MG: Performed by: INTERNAL MEDICINE

## 2022-11-21 PROCEDURE — 86709 HEPATITIS A IGM ANTIBODY: CPT | Performed by: INTERNAL MEDICINE

## 2022-11-21 PROCEDURE — 80053 COMPREHEN METABOLIC PANEL: CPT | Performed by: INTERNAL MEDICINE

## 2022-11-21 PROCEDURE — 25010000002 CEFTRIAXONE PER 250 MG: Performed by: NURSE PRACTITIONER

## 2022-11-21 PROCEDURE — 86664 EPSTEIN-BARR NUCLEAR ANTIGEN: CPT | Performed by: INTERNAL MEDICINE

## 2022-11-21 PROCEDURE — 71045 X-RAY EXAM CHEST 1 VIEW: CPT

## 2022-11-21 RX ORDER — LISINOPRIL 20 MG/1
20 TABLET ORAL
Status: DISCONTINUED | OUTPATIENT
Start: 2022-11-22 | End: 2022-11-22

## 2022-11-21 RX ORDER — HYDROCHLOROTHIAZIDE 12.5 MG/1
12.5 CAPSULE, GELATIN COATED ORAL DAILY
Status: DISCONTINUED | OUTPATIENT
Start: 2022-11-21 | End: 2022-11-22 | Stop reason: HOSPADM

## 2022-11-21 RX ADMIN — PREDNISONE 40 MG: 20 TABLET ORAL at 08:26

## 2022-11-21 RX ADMIN — CEFTRIAXONE 1 G: 1 INJECTION, POWDER, FOR SOLUTION INTRAMUSCULAR; INTRAVENOUS at 23:52

## 2022-11-21 RX ADMIN — BUSPIRONE HYDROCHLORIDE 5 MG: 5 TABLET ORAL at 20:36

## 2022-11-21 RX ADMIN — BUSPIRONE HYDROCHLORIDE 5 MG: 5 TABLET ORAL at 08:26

## 2022-11-21 RX ADMIN — HYDROCHLOROTHIAZIDE 12.5 MG: 12.5 CAPSULE ORAL at 11:23

## 2022-11-21 RX ADMIN — COLCHICINE 0.6 MG: 0.6 TABLET, FILM COATED ORAL at 08:26

## 2022-11-21 RX ADMIN — LEVOTHYROXINE SODIUM 100 MCG: 0.1 TABLET ORAL at 05:55

## 2022-11-21 RX ADMIN — COLCHICINE 0.6 MG: 0.6 TABLET, FILM COATED ORAL at 20:36

## 2022-11-21 RX ADMIN — BUSPIRONE HYDROCHLORIDE 5 MG: 5 TABLET ORAL at 15:24

## 2022-11-21 RX ADMIN — LISINOPRIL 10 MG: 10 TABLET ORAL at 08:26

## 2022-11-21 RX ADMIN — FLUOXETINE 20 MG: 20 CAPSULE ORAL at 08:26

## 2022-11-21 NOTE — PLAN OF CARE
Goal Outcome Evaluation:   VSS. RA sats >93%. Ordered to tele. UOP adequate. Afebrile. BM x2. Up to chair most of the day. Shoulder pain resolved. 25 ml out of drain this shift.

## 2022-11-21 NOTE — PLAN OF CARE
Goal Outcome Evaluation:  Plan of Care Reviewed With: patient        Progress: no change  Outcome Evaluation: VSS on RA. No c/o pain. 7 ml output from pericardial drain. Up to toilet x2. Awaiting AM labs. Awaiting telemetry bed.

## 2022-11-21 NOTE — PROGRESS NOTES
[facility] Cardiology    Inpatient Progress Note      Chief Complaint/Reason for service:    Pericardial effusion         Subjective:       Patient sitting up in bed eating breakfast. Continues to have some mild chest soreness.  Denies shortness of breath or palpitations. No acute events overnight.     Past medical, surgical, social and family history reviewed in the patient's electronic medical record.    Review of Systems:   Positive for back discomfort, chest soreness   Negative for dyspnea at rest, lower extremity edema, palpitations    Problem List  Active Hospital Problems    Diagnosis  POA    **Shock (HCC) [R57.9]  Yes    Pericarditis due to ? (R/O post-viral) [I30.0]  Yes    RYLEY  [N17.9]  Yes    Pericardial effusion [I31.39]  Yes    R/O Sepsis associated hypotension (HCC) [A41.9, I95.9]  Yes    Elevated LFTs (R/O hepatic congestion) [R79.89]  Yes      Resolved Hospital Problems   No resolved problems to display.            Objective:      Infusions:  phenylephrine, 0.5-3 mcg/kg/min, Last Rate: Stopped (11/19/22 1102)         Medications:    Current Facility-Administered Medications:     acetaminophen (TYLENOL) tablet 650 mg, 650 mg, Oral, Q4H PRN, Luís Tran MD    busPIRone (BUSPAR) tablet 5 mg, 5 mg, Oral, TID, Luís Tran MD, 5 mg at 11/20/22 2054    cefTRIAXone (ROCEPHIN) 1 g/100 mL 0.9% NS (MBP), 1 g, Intravenous, Q24H, Luís Tran MD, 1 g at 11/20/22 2347    colchicine tablet 0.6 mg, 0.6 mg, Oral, Q12H, Luís Tran MD, 0.6 mg at 11/20/22 2054    FLUoxetine (PROzac) capsule 20 mg, 20 mg, Oral, Daily, Luís Tran MD, 20 mg at 11/20/22 0800    HYDROcodone-acetaminophen (NORCO) 5-325 MG per tablet 1 tablet, 1 tablet, Oral, Q6H PRN, Meredith Nolen APRN, 1 tablet at 11/19/22 1303    levothyroxine (SYNTHROID, LEVOTHROID) tablet 100 mcg, 100 mcg, Oral, Q AM, Luís Tran MD, 100 mcg at 11/21/22 0555    lisinopril (PRINIVIL,ZESTRIL) tablet 10 mg, 10 mg, Oral,  Q24H, Luís Tran MD, 10 mg at 11/20/22 1050    phenylephrine (FRITZ-SYNEPHRINE) 50 mg in sodium chloride 0.9 % 250 mL infusion, 0.5-3 mcg/kg/min, Intravenous, Titrated, Luís Tran MD, Held at 11/19/22 1102    predniSONE (DELTASONE) tablet 40 mg, 40 mg, Oral, Daily With Breakfast, Luís Tran MD, 40 mg at 11/20/22 0800    sodium chloride 0.9 % bolus 500 mL, 500 mL, Intravenous, Q1H PRN, Luís Tran MD    Vital Sign Min/Max for last 24 hours  Temp  Min: 98 °F (36.7 °C)  Max: 98.7 °F (37.1 °C)   BP  Min: 122/81  Max: 160/96   Pulse  Min: 73  Max: 104   Resp  Min: 16  Max: 18   SpO2  Min: 85 %  Max: 96 %   No data recorded      Intake/Output Summary (Last 24 hours) at 11/21/2022 0738  Last data filed at 11/21/2022 0400  Gross per 24 hour   Intake 1079.1 ml   Output 32 ml   Net 1047.1 ml           CONSTITUTIONAL: No acute distress  CARDIOVASCULAR: Regular rate and rhythm, no murmur.  Pericardial drain site clean dry and intact.  Continues to drain but slowing down.     Labs/studies:  Available lab and imaging results were reviewed by myself today    Results for orders placed during the hospital encounter of 11/18/22    Adult Transthoracic Echo Limited W/ Cont if Necessary Per Protocol    Interpretation Summary    This was a postprocedural limited echocardiogram following a pericardiocentesis.    Left ventricular systolic function is normal. Left ventricular ejection fraction appears to be 61 - 65%.    Following the pericardiocentesis, there was no visually evident residual pericardial effusion or tamponade physiology.      Tele: Sinus rhythm           Assessment/Plan:       ASSESSMENT:  Recent respiratory illness  Chest pain with abnormal EKG  LHC without obstructive CAD  Findings consistent with pericarditis  Pericardial effusion, features consistent with cardiac tamponade on echo 11/19, progressed from echo performed on 11/18  Hypotension  RYLEY  Improved after  pericardiocentesis  Hypertension  Hypothyroidism  Elevated LFTs  Shock liver    PLAN:  Keep pericardial drain in place until drainage is minimal, less than 25 cc in a day.  Drainage has slowed over the last 24 hours. Continue to monitor drainage.  No growth on fluid culture.  Cytology pending.  Decreased prednisone to 30 mg daily.  We will need to determine a final taper upon discharge.  Due to the severity of her pericarditis and effusion, we will plan on a slow taper.  Continue colchicine for 3 months.  Increase lisinopril back to 20 mg home dose.  We added HCTZ, home dose  Daily LFTs, serology testing as ordered by the intensivist team  Okay to transfer to telemetry from a cardiac standpoint      Electronically signed by TAMAR Singer, 11/21/22, 7:38 AM EST.

## 2022-11-21 NOTE — CASE MANAGEMENT/SOCIAL WORK
Discharge Planning Assessment  [facility]     Patient Name: Razia Sequeira  MRN: [id number]  Today's Date: 11/21/2022    Admit Date: 11/18/2022    Plan: Home with family   Discharge Needs Assessment     Row Name 11/21/22 0829       Living Environment    People in Home spouse    Name(s) of People in Home Dewayne Sequeira (spouse) [phone]    Current Living Arrangements home    Primary Care Provided by self    Provides Primary Care For no one    Family Caregiver if Needed spouse    Family Caregiver Names Dewayne    Able to Return to Prior Arrangements yes       Resource/Environmental Concerns    Resource/Environmental Concerns none    Transportation Concerns none       Transition Planning    Patient/Family Anticipates Transition to home with family    Patient/Family Anticipated Services at Transition none    Transportation Anticipated family or friend will provide       Discharge Needs Assessment    Readmission Within the Last 30 Days no previous admission in last 30 days    Equipment Currently Used at Home none    Concerns to be Addressed denies needs/concerns at this time    Anticipated Changes Related to Illness none    Equipment Needed After Discharge none               Discharge Plan     Row Name 11/21/22 0881       Plan    Plan Home with family    Patient/Family in Agreement with Plan yes    Plan Comments Spoke to patient at bedside. Lives with Dewayne Sequeira (spouse) [phone] in [facility]. Is independent with ADL's. No problems with [place] [employer] or medications. Uses no DME at home. Has no advanced directives. PCP is TAMAR Monroe. Plan is home with family. CM will continue to follow.    Final Discharge Disposition Code 01 - home or self-care              Continued Care and Services - Admitted Since 11/18/2022    Coordination has not been started for this encounter.          Demographic Summary     Row Name 11/21/22 0829       General Information    Admission Type inpatient    Arrived From [facility]     Referral Source admission list    Reason for Consult discharge planning    Preferred Language English       Contact Information    Permission Granted to Share Info With     Contact Information Obtained for                Functional Status     Row Name 11/21/22 0829       Functional Status    Usual Activity Tolerance good    Current Activity Tolerance good       Functional Status, IADL    Medications independent    Meal Preparation independent    Housekeeping independent    Laundry independent    Shopping independent       Mental Status    General Appearance WDL WDL       Mental Status Summary    Recent Changes in Mental Status/Cognitive Functioning no changes       Employment/    Employment Status homemaker               Psychosocial    No documentation.                Abuse/Neglect    No documentation.                Legal    No documentation.                Substance Abuse    No documentation.                Patient Forms    No documentation.                   Ad Pugh RN

## 2022-11-22 ENCOUNTER — APPOINTMENT (OUTPATIENT)
Dept: CARDIOLOGY | Facility: HOSPITAL | Age: 57
End: 2022-11-22

## 2022-11-22 VITALS
TEMPERATURE: 98.6 F | WEIGHT: 220.02 LBS | OXYGEN SATURATION: 95 % | RESPIRATION RATE: 18 BRPM | BODY MASS INDEX: 35.36 KG/M2 | HEART RATE: 92 BPM | DIASTOLIC BLOOD PRESSURE: 89 MMHG | SYSTOLIC BLOOD PRESSURE: 154 MMHG | HEIGHT: 66 IN

## 2022-11-22 LAB
ALBUMIN FLD-MCNC: 1.6 G/DL
ALBUMIN SERPL-MCNC: 3.4 G/DL (ref 3.5–5.2)
ALBUMIN/GLOB SERPL: 1 G/DL
ALP SERPL-CCNC: 89 U/L (ref 39–117)
ALT SERPL W P-5'-P-CCNC: 1335 U/L (ref 1–33)
ANION GAP SERPL CALCULATED.3IONS-SCNC: 11 MMOL/L (ref 5–15)
AST SERPL-CCNC: 284 U/L (ref 1–32)
BACTERIA FLD CULT: NORMAL
BH CV VAS BP LEFT ARM: NORMAL MMHG
BILIRUB SERPL-MCNC: 0.4 MG/DL (ref 0–1.2)
BUN SERPL-MCNC: 15 MG/DL (ref 6–20)
BUN/CREAT SERPL: 20.3 (ref 7–25)
CALCIUM SPEC-SCNC: 8.8 MG/DL (ref 8.6–10.5)
CHLORIDE SERPL-SCNC: 102 MMOL/L (ref 98–107)
CO2 SERPL-SCNC: 27 MMOL/L (ref 22–29)
CREAT SERPL-MCNC: 0.74 MG/DL (ref 0.57–1)
EBV NA IGG SER IA-ACNC: 87.5 U/ML (ref 0–17.9)
EBV VCA IGG SER IA-ACNC: 158 U/ML (ref 0–17.9)
EBV VCA IGM SER IA-ACNC: <36 U/ML (ref 0–35.9)
EGFRCR SERPLBLD CKD-EPI 2021: 93.3 ML/MIN/1.73
GAMMA INTERFERON BACKGROUND BLD IA-ACNC: 0 IU/ML
GLOBULIN UR ELPH-MCNC: 3.5 GM/DL
GLUCOSE FLD-MCNC: 68 MG/DL
GLUCOSE SERPL-MCNC: 85 MG/DL (ref 65–99)
GRAM STN SPEC: NORMAL
GRAM STN SPEC: NORMAL
HAV AB SER QL IA: POSITIVE
HAV IGM SERPL QL IA: NEGATIVE
HBV CORE AB SERPL QL IA: NEGATIVE
HBV CORE IGM SERPL QL IA: NEGATIVE
HBV SURFACE AB SER QL: NON REACTIVE
HBV SURFACE AG SERPL QL IA: NEGATIVE
LDH FLD L TO P-CCNC: 4461 IU/L
M TB IFN-G BLD-IMP: ABNORMAL
M TB IFN-G CD4+ BCKGRND COR BLD-ACNC: 0 IU/ML
M TB IFN-G CD4+CD8+ BCKGRND COR BLD-ACNC: 0 IU/ML
MAXIMAL PREDICTED HEART RATE: 161 BPM
MITOGEN IGNF BLD-ACNC: 0.04 IU/ML
POTASSIUM SERPL-SCNC: 3.6 MMOL/L (ref 3.5–5.2)
PROT FLD-MCNC: 3.1 G/DL
PROT SERPL-MCNC: 6.9 G/DL (ref 6–8.5)
QUANTIFERON INCUBATION: ABNORMAL
REF LAB TEST METHOD: NORMAL
SERVICE CMNT-IMP: ABNORMAL
SERVICE CMNT-IMP: ABNORMAL
SODIUM SERPL-SCNC: 140 MMOL/L (ref 136–145)
STRESS TARGET HR: 137 BPM

## 2022-11-22 PROCEDURE — 63710000001 PREDNISONE PER 5 MG: Performed by: INTERNAL MEDICINE

## 2022-11-22 PROCEDURE — 93308 TTE F-UP OR LMTD: CPT

## 2022-11-22 PROCEDURE — 93321 DOPPLER ECHO F-UP/LMTD STD: CPT | Performed by: INTERNAL MEDICINE

## 2022-11-22 PROCEDURE — 93321 DOPPLER ECHO F-UP/LMTD STD: CPT

## 2022-11-22 PROCEDURE — 99239 HOSP IP/OBS DSCHRG MGMT >30: CPT | Performed by: INTERNAL MEDICINE

## 2022-11-22 PROCEDURE — 63710000001 PREDNISONE PER 1 MG: Performed by: INTERNAL MEDICINE

## 2022-11-22 PROCEDURE — 93308 TTE F-UP OR LMTD: CPT | Performed by: INTERNAL MEDICINE

## 2022-11-22 PROCEDURE — 80053 COMPREHEN METABOLIC PANEL: CPT | Performed by: NURSE PRACTITIONER

## 2022-11-22 RX ORDER — PREDNISONE 10 MG/1
TABLET ORAL
Qty: 95 TABLET | Refills: 0 | Status: SHIPPED | OUTPATIENT
Start: 2022-11-23 | End: 2023-01-11

## 2022-11-22 RX ORDER — COLCHICINE 0.6 MG/1
0.6 TABLET ORAL EVERY 12 HOURS SCHEDULED
Qty: 60 TABLET | Refills: 2 | Status: SHIPPED | OUTPATIENT
Start: 2022-11-22 | End: 2023-01-30

## 2022-11-22 RX ORDER — PREDNISONE 2.5 MG
TABLET ORAL
Qty: 11 TABLET | Refills: 0 | Status: SHIPPED | OUTPATIENT
Start: 2023-01-11 | End: 2022-12-01

## 2022-11-22 RX ORDER — LISINOPRIL 20 MG/1
20 TABLET ORAL
Status: DISCONTINUED | OUTPATIENT
Start: 2022-11-22 | End: 2022-11-22 | Stop reason: HOSPADM

## 2022-11-22 RX ADMIN — PREDNISONE 30 MG: 20 TABLET ORAL at 09:02

## 2022-11-22 RX ADMIN — COLCHICINE 0.6 MG: 0.6 TABLET, FILM COATED ORAL at 09:01

## 2022-11-22 RX ADMIN — HYDROCHLOROTHIAZIDE 12.5 MG: 12.5 CAPSULE ORAL at 09:01

## 2022-11-22 RX ADMIN — LEVOTHYROXINE SODIUM 100 MCG: 0.1 TABLET ORAL at 05:11

## 2022-11-22 RX ADMIN — BUSPIRONE HYDROCHLORIDE 5 MG: 5 TABLET ORAL at 09:01

## 2022-11-22 RX ADMIN — BUSPIRONE HYDROCHLORIDE 5 MG: 5 TABLET ORAL at 17:43

## 2022-11-22 RX ADMIN — LISINOPRIL 20 MG: 20 TABLET ORAL at 05:11

## 2022-11-22 RX ADMIN — FLUOXETINE 20 MG: 20 CAPSULE ORAL at 09:03

## 2022-11-22 NOTE — PROGRESS NOTES
Brief cardiology update note   -Pericardial drain with minimal residual drainage over the last 24 hours, less than 5 cc.  Pericardial fluid culture negative and final  -Pericardial drain removed at bedside this morning  -Repeat echo later today  -Repeat CMP reordered and pending  -If echo and CMP acceptable, later this afternoon, okay for discharge  -We will arrange for follow-up echo and CMP, follow-up visit  -Long-term if hypertension not at goal, will uptitrate antihypertensives    Luís Tran MD, MSc, FACC, [facility]  Interventional Cardiology  [facility]

## 2022-11-22 NOTE — DISCHARGE SUMMARY
[facility] Cardiology  DISCHARGE SUMMARY    Admission Date: 11/18/2022       Date of Discharge:  11/22/2022    Discharge Diagnosis: Pericardial effusion, cardiac tamponade    Presenting Problem/History of Present Illness  Shock (HCC) [R57.9]    Hospital Course  Patient is a 57 y.o. female presented with recent upper respiratory syndrome.  Initially had improvement and then had clinical decline 5 days prior to presentation.  Presented with chest pain, dyspnea.  Had evidence of ST elevation on EKG.  Underwent urgent heart catheterization which did not reveal obstructive CAD.  Underwent a stat echo which revealed a normal LVEF but large pericardial effusion.  Patient developed features of tamponade, confirmed on echo the following day.  Underwent a pericardiocentesis.  Monitored closely for several days.  On the day of discharge, pericardial drain was removed and repeat echo revealed trivial effusion and no evidence of tamponade.    Through this process the patient did develop shock liver, which was treated conservatively.  Liver enzymes decreased each subsequent day.    Was given antibiotics during the admission, but cultures ultimately came back negative.    Had RYLEY on admission, this improved    Initially hypotensive.  After pericardiocentesis, slowly became more hypertensive despite restarting home medications.  Reassess BP at follow-up.  Consider additional antihypertensives as needed    Discharge plan:  -Follow-up in the heart valve clinic in 1 week.    -Repeat limited echo, labs (CMP and CRP) at that time.    -If symptoms controlled, echo acceptable, and C-reactive protein downtrending, continue steroid taper (Prednisone 30 mg once a day for 2 weeks, 25 mg a day for 1 week, 20 mg a day for 1 week, 15 mg a day for 1 week, 10 mg a day for 1 week, 5 mg a day for 1 week, 2.5 mg a day for 1 week, and 1.25 mg a day for 1 week)      Consults:   Consults     No orders found from 10/20/2022 to  11/19/2022.          Pertinent Test/Procedure Results: See electronic medical    Condition on Discharge:  Stable    Physical Exam at Discharge  Vital Signs  Temp:  [97.8 °F (36.6 °C)-99.6 °F (37.6 °C)] 98.6 °F (37 °C)  Heart Rate:  [75-89] 86  Resp:  [16-18] 18  BP: (138-165)/() 154/89  Physical Exam:  GEN: No acute distress  PULM: Clear to auscultation  CV: Regular rate and rhythm    Discharge Disposition: Home    Discharge Diet: Cardiac heart healthy diet    Activity at Discharge: As tolerated    Follow-up Appointments  Future Appointments   Date Time Provider Department Center   11/30/2022  8:45 AM [facility] ADDED VIEWS [facility]     Additional Instructions for the Follow-ups that You Need to Schedule     Discharge Follow-up with PCP   As directed       Currently Documented PCP:    Swapna Moulton APRN    PCP Phone Number:    [phone]     Follow Up Details: 1 to 2 weeks         Discharge Follow-up with Specialty: Dr. Tran in 6 to 8 weeks   As directed      Specialty: Dr. Tran in 6 to 8 weeks         Discharge Follow-up with Specialty: Heart and valve clinic 1 week   As directed      Specialty: Heart and valve clinic 1 week         C-reactive Protein    Nov 27, 2022 (Approximate)      Have blood checked on day of follow-up with heart valve clinic    Order Comments: Have blood checked on day of follow-up with heart valve clinic     Release to patient: Routine Release               Discharge Medications     Discharge Medications      New Medications      Instructions Start Date   colchicine 0.6 MG tablet   0.6 mg, Oral, Every 12 Hours Scheduled      predniSONE 10 MG tablet  Commonly known as: DELTASONE   Take 3 tablets by mouth Daily With Breakfast for 14 days, THEN 2.5 tablets Daily With Breakfast for 7 days, THEN 2 tablets Daily With Breakfast for 7 days, THEN 1.5 tablets Daily With Breakfast for 7 days, THEN 1 tablet Daily With Breakfast for 7 days, THEN 0.5 tablets Daily With Breakfast for 7 days.    Start Date: November 23, 2022     predniSONE 2.5 MG tablet  Commonly known as: DELTASONE   Take 1 tablet by mouth Daily for 7 days, THEN 0.5 tablets Daily for 7 days.   Start Date: January 11, 2023        Continue These Medications      Instructions Start Date   albuterol sulfate  (90 Base) MCG/ACT inhaler  Commonly known as: PROVENTIL HFA;VENTOLIN HFA;PROAIR HFA   INHALE 2 PUFFS BY MOUTH EVERY 4 HOURS AS NEEDED FOR WHEEZING      busPIRone 5 MG tablet  Commonly known as: BUSPAR   5 mg, Oral, 3 Times Daily      EQ Loratadine 10 MG tablet  Generic drug: loratadine   Take 1 tablet by mouth once daily      estradiol 0.0375 MG/24HR patch  Commonly known as: VIVELLE-DOT   1 patch, Transdermal, 2 Times Weekly      fish oil 1000 MG capsule capsule   Oral, Daily With Breakfast      FLAX SEEDS PO   Oral      FLUoxetine 20 MG capsule  Commonly known as: PROzac   20 mg, Oral, Daily      fluticasone 50 MCG/ACT nasal spray  Commonly known as: Flonase   1-2 sprays in each nostril daily      levothyroxine 100 MCG tablet  Commonly known as: Euthyrox   100 mcg, Oral, Daily      lisinopril-hydrochlorothiazide 20-12.5 MG per tablet  Commonly known as: PRINZIDE,ZESTORETIC   1 tablet, Oral, Daily      MAGNESIUM DR PO   Oral      mometasone 110 MCG/INH inhaler  Commonly known as: ASMANEX TWISTHALER   2 puffs, Inhalation, Daily - RT      montelukast 10 MG tablet  Commonly known as: Singulair   10 mg, Oral, Nightly      multivitamin with minerals tablet tablet   Oral      vitamin C 250 MG tablet  Commonly known as: ASCORBIC ACID   250 mg, Oral, Daily      ZINC ACETATE PO   Oral              Luís Tran MD  11/22/22  17:22 EST    Time: I spent 35 minutes on this discharge activity which included: face-to-face encounter with the patient, reviewing the data in the system, coordination of the care with the nursing staff as well as consultants, documentation, and entering orders.

## 2022-11-22 NOTE — PLAN OF CARE
Goal Outcome Evaluation:  Plan of Care Reviewed With: patient        Progress: improving     Pt remains AxOx4, no acute events overnight. PT had HTN this AM, called for orders and gave lisinopril 20 mg early. Pt had 2 ML out of her pericardial drain. Pt has no complaints.

## 2022-11-23 ENCOUNTER — READMISSION MANAGEMENT (OUTPATIENT)
Dept: CALL CENTER | Facility: HOSPITAL | Age: 57
End: 2022-11-23

## 2022-11-23 LAB
CV B1 AB TITR SER CF: ABNORMAL {TITER}
CV B2 AB TITR SER CF: ABNORMAL {TITER}
CV B3 AB TITR SER CF: ABNORMAL {TITER}
CV B4 AB TITR SER CF: ABNORMAL {TITER}
CV B5 AB TITR SER CF: ABNORMAL {TITER}
CV B6 AB TITR SER CF: ABNORMAL {TITER}

## 2022-11-23 NOTE — PAYOR COMM NOTE
"Razia Borja (57 y.o. Female)     [id number]    Suzanna Arriaga, RN  Utilization Review  [phone]  [phone]      Date of Birth   1965    Social Security Number       Address   [address] [place], [place] [address]    Home Phone   [phone]    MRN   [id number]       Scientology   None    Marital Status                               Admission Date   11/18/22    Admission Type   Elective    Admitting Provider   Luís Tran MD    Attending Provider       Department, Room/Bed   [address], S482/1       Discharge Date   11/22/2022    Discharge Disposition   Home or Self Care    Discharge Destination                               Attending Provider: (none)   Allergies: No Known Allergies    Isolation: None   Infection: None   Code Status: Prior    Ht: 167.6 cm (65.98\")   Wt: 99.8 kg (220 lb 0.3 oz)    Admission Cmt: None   Principal Problem: Acute idiopathic pericarditis [I30.0]                 Active Insurance as of 11/18/2022     Primary Coverage     Payor Plan Insurance Group Employer/Plan Group    [id number] PPO [id number]     Payor Plan Address Payor Plan Phone Number Payor Plan Fax Number Effective Dates    [address] [phone]  5/1/2016 - None Entered    [place] [address]       Subscriber Name Subscriber Birth Date Member ID       BORJA,DAVID [birth year]1962 [id number]                 Emergency Contacts      (Rel.) Home Phone Work Phone Mobile Phone    Jose Borja (Spouse) [phone] -- [phone]               Discharge Summary      Luís Tran MD at 11/22/22 1721            [facility] Cardiology  DISCHARGE SUMMARY    Admission Date: 11/18/2022       Date of Discharge:  11/22/2022    Discharge Diagnosis: Pericardial effusion, cardiac tamponade    Presenting Problem/History of Present Illness  Shock (HCC) [R57.9]    Hospital Course  Patient is a 57 y.o. female " presented with recent upper respiratory syndrome.  Initially had improvement and then had clinical decline 5 days prior to presentation.  Presented with chest pain, dyspnea.  Had evidence of ST elevation on EKG.  Underwent urgent heart catheterization which did not reveal obstructive CAD.  Underwent a stat echo which revealed a normal LVEF but large pericardial effusion.  Patient developed features of tamponade, confirmed on echo the following day.  Underwent a pericardiocentesis.  Monitored closely for several days.  On the day of discharge, pericardial drain was removed and repeat echo revealed trivial effusion and no evidence of tamponade.    Through this process the patient did develop shock liver, which was treated conservatively.  Liver enzymes decreased each subsequent day.    Was given antibiotics during the admission, but cultures ultimately came back negative.    Had RYLEY on admission, this improved    Initially hypotensive.  After pericardiocentesis, slowly became more hypertensive despite restarting home medications.  Reassess BP at follow-up.  Consider additional antihypertensives as needed    Discharge plan:  -Follow-up in the heart valve clinic in 1 week.    -Repeat limited echo, labs (CMP and CRP) at that time.    -If symptoms controlled, echo acceptable, and C-reactive protein downtrending, continue steroid taper (Prednisone 30 mg once a day for 2 weeks, 25 mg a day for 1 week, 20 mg a day for 1 week, 15 mg a day for 1 week, 10 mg a day for 1 week, 5 mg a day for 1 week, 2.5 mg a day for 1 week, and 1.25 mg a day for 1 week)      Consults:   Consults     No orders found from 10/20/2022 to 11/19/2022.          Pertinent Test/Procedure Results: See electronic medical    Condition on Discharge:  Stable    Physical Exam at Discharge  Vital Signs  Temp:  [97.8 °F (36.6 °C)-99.6 °F (37.6 °C)] 98.6 °F (37 °C)  Heart Rate:  [75-89] 86  Resp:  [16-18] 18  BP: (138-165)/() 154/89  Physical Exam:  GEN: No  acute distress  PULM: Clear to auscultation  CV: Regular rate and rhythm    Discharge Disposition: Home    Discharge Diet: Cardiac heart healthy diet    Activity at Discharge: As tolerated    Follow-up Appointments  Future Appointments   Date Time Provider Department Center   11/30/2022  8:45 AM [facility] ADDED VIEWS [facility]     Additional Instructions for the Follow-ups that You Need to Schedule     Discharge Follow-up with PCP   As directed       Currently Documented PCP:    Swapna Moulton APRN    PCP Phone Number:    [phone]     Follow Up Details: 1 to 2 weeks         Discharge Follow-up with Specialty: Dr. Tran in 6 to 8 weeks   As directed      Specialty: Dr. Tran in 6 to 8 weeks         Discharge Follow-up with Specialty: Heart and valve clinic 1 week   As directed      Specialty: Heart and valve clinic 1 week         C-reactive Protein    Nov 27, 2022 (Approximate)      Have blood checked on day of follow-up with heart valve clinic    Order Comments: Have blood checked on day of follow-up with heart valve clinic     Release to patient: Routine Release               Discharge Medications     Discharge Medications      New Medications      Instructions Start Date   colchicine 0.6 MG tablet   0.6 mg, Oral, Every 12 Hours Scheduled      predniSONE 10 MG tablet  Commonly known as: DELTASONE   Take 3 tablets by mouth Daily With Breakfast for 14 days, THEN 2.5 tablets Daily With Breakfast for 7 days, THEN 2 tablets Daily With Breakfast for 7 days, THEN 1.5 tablets Daily With Breakfast for 7 days, THEN 1 tablet Daily With Breakfast for 7 days, THEN 0.5 tablets Daily With Breakfast for 7 days.   Start Date: November 23, 2022     predniSONE 2.5 MG tablet  Commonly known as: DELTASONE   Take 1 tablet by mouth Daily for 7 days, THEN 0.5 tablets Daily for 7 days.   Start Date: January 11, 2023        Continue These Medications      Instructions Start Date   albuterol sulfate  (90 Base) MCG/ACT  inhaler  Commonly known as: PROVENTIL HFA;VENTOLIN HFA;PROAIR HFA   INHALE 2 PUFFS BY MOUTH EVERY 4 HOURS AS NEEDED FOR WHEEZING      busPIRone 5 MG tablet  Commonly known as: BUSPAR   5 mg, Oral, 3 Times Daily      EQ Loratadine 10 MG tablet  Generic drug: loratadine   Take 1 tablet by mouth once daily      estradiol 0.0375 MG/24HR patch  Commonly known as: VIVELLE-DOT   1 patch, Transdermal, 2 Times Weekly      fish oil 1000 MG capsule capsule   Oral, Daily With Breakfast      FLAX SEEDS PO   Oral      FLUoxetine 20 MG capsule  Commonly known as: PROzac   20 mg, Oral, Daily      fluticasone 50 MCG/ACT nasal spray  Commonly known as: Flonase   1-2 sprays in each nostril daily      levothyroxine 100 MCG tablet  Commonly known as: Euthyrox   100 mcg, Oral, Daily      lisinopril-hydrochlorothiazide 20-12.5 MG per tablet  Commonly known as: PRINZIDE,ZESTORETIC   1 tablet, Oral, Daily      MAGNESIUM DR PO   Oral      mometasone 110 MCG/INH inhaler  Commonly known as: ASMANEX TWISTHALER   2 puffs, Inhalation, Daily - RT      montelukast 10 MG tablet  Commonly known as: Singulair   10 mg, Oral, Nightly      multivitamin with minerals tablet tablet   Oral      vitamin C 250 MG tablet  Commonly known as: ASCORBIC ACID   250 mg, Oral, Daily      ZINC ACETATE PO   Oral              Luís Tran MD  11/22/22  17:22 EST    Time: I spent 35 minutes on this discharge activity which included: face-to-face encounter with the patient, reviewing the data in the system, coordination of the care with the nursing staff as well as consultants, documentation, and entering orders.        Electronically signed by Luís Tran MD at 11/22/22 6498

## 2022-11-23 NOTE — OUTREACH NOTE
Prep Survey    Flowsheet Row Responses   [facility] facility patient discharged from? [facility]   Is LACE score < 7 ? No   Emergency Room discharge w/ pulse ox? No   Eligibility Readm Mgmt   Discharge diagnosis  cardiac tamponade   Does the patient have one of the following disease processes/diagnoses(primary or secondary)? Other   Does the patient have Home health ordered? No   Is there a DME ordered? No   Prep survey completed? Yes          MILLY CROUCH - Registered Nurse

## 2022-11-24 LAB
BACTERIA SPEC AEROBE CULT: NORMAL
BACTERIA SPEC AEROBE CULT: NORMAL

## 2022-11-29 ENCOUNTER — READMISSION MANAGEMENT (OUTPATIENT)
Dept: CALL CENTER | Facility: HOSPITAL | Age: 57
End: 2022-11-29

## 2022-11-29 NOTE — OUTREACH NOTE
Medical Week 1 Survey    Flowsheet Row Responses   [facility] patient discharged from? [facility]   Does the patient have one of the following disease processes/diagnoses(primary or secondary)? Other   Week 1 attempt successful? Yes   Call start time 1412   Call end time 1413   Discharge diagnosis cardiac tamponade,  PERICARDIOCENTESIS   Meds reviewed with patient/caregiver? Yes   Does the patient have all medications ordered at discharge? Yes   Is the patient taking all medications as directed (includes completed medication regime)? Yes   Comments regarding appointments Appt for mammogram is on 11/30/22   Does the patient have a primary care provider?  Yes   Does the patient have an appointment with their PCP within 7 days of discharge? Yes   Comments regarding PCP 12/1/22   Has the patient kept scheduled appointments due by today? N/A   Psychosocial issues? No   Did the patient receive a copy of their discharge instructions? Yes   Nursing interventions Reviewed instructions with patient   What is the patient's perception of their health status since discharge? Improving   Is the patient/caregiver able to teach back signs and symptoms related to disease process for when to call PCP? Yes   Is the patient/caregiver able to teach back signs and symptoms related to disease process for when to call 911? Yes   Is the patient/caregiver able to teach back the hierarchy of who to call/visit for symptoms/problems? PCP, Specialist, Home health nurse, Urgent Care, ED, 911 Yes   If the patient is a current smoker, are they able to teach back resources for cessation? Not a smoker   Week 1 call completed? Yes   Is the patient interested in additional calls from an ambulatory ?  NOTE:  applies to high risk patients requiring additional follow-up. No   Revoked No further contact(revokes)-requires comment   Graduated/Revoked comments Pt is improving          ETTA SANTOS - Registered Nurse

## 2022-11-30 ENCOUNTER — HOSPITAL ENCOUNTER (OUTPATIENT)
Dept: MAMMOGRAPHY | Facility: HOSPITAL | Age: 57
Discharge: HOME | End: 2022-11-30

## 2022-11-30 ENCOUNTER — HOSPITAL ENCOUNTER (OUTPATIENT)
Dept: ULTRASOUND IMAGING | Facility: HOSPITAL | Age: 57
Discharge: HOME | End: 2022-11-30

## 2022-11-30 DIAGNOSIS — R92.8 ABNORMAL MAMMOGRAM: ICD-10-CM

## 2022-11-30 PROCEDURE — 77061 BREAST TOMOSYNTHESIS UNI: CPT | Performed by: RADIOLOGY

## 2022-11-30 PROCEDURE — G0279 TOMOSYNTHESIS, MAMMO: HCPCS

## 2022-11-30 PROCEDURE — 76642 ULTRASOUND BREAST LIMITED: CPT

## 2022-11-30 PROCEDURE — 76642 ULTRASOUND BREAST LIMITED: CPT | Performed by: RADIOLOGY

## 2022-11-30 PROCEDURE — 77065 DX MAMMO INCL CAD UNI: CPT | Performed by: RADIOLOGY

## 2022-11-30 PROCEDURE — 77065 DX MAMMO INCL CAD UNI: CPT

## 2022-12-01 ENCOUNTER — HOSPITAL ENCOUNTER (OUTPATIENT)
Dept: CARDIOLOGY | Facility: HOSPITAL | Age: 57
Discharge: HOME | End: 2022-12-01

## 2022-12-01 ENCOUNTER — OFFICE VISIT (OUTPATIENT)
Dept: CARDIOLOGY | Facility: HOSPITAL | Age: 57
End: 2022-12-01

## 2022-12-01 ENCOUNTER — OFFICE VISIT (OUTPATIENT)
Dept: FAMILY MEDICINE CLINIC | Facility: CLINIC | Age: 57
End: 2022-12-01

## 2022-12-01 ENCOUNTER — LAB (OUTPATIENT)
Dept: LAB | Facility: HOSPITAL | Age: 57
End: 2022-12-01

## 2022-12-01 VITALS
HEIGHT: 66 IN | OXYGEN SATURATION: 98 % | RESPIRATION RATE: 17 BRPM | WEIGHT: 223.6 LBS | HEART RATE: 75 BPM | SYSTOLIC BLOOD PRESSURE: 129 MMHG | BODY MASS INDEX: 35.93 KG/M2 | TEMPERATURE: 96.9 F | DIASTOLIC BLOOD PRESSURE: 75 MMHG

## 2022-12-01 VITALS
TEMPERATURE: 97.8 F | HEIGHT: 66 IN | OXYGEN SATURATION: 96 % | DIASTOLIC BLOOD PRESSURE: 95 MMHG | RESPIRATION RATE: 20 BRPM | HEART RATE: 104 BPM | WEIGHT: 222 LBS | SYSTOLIC BLOOD PRESSURE: 140 MMHG | BODY MASS INDEX: 35.68 KG/M2

## 2022-12-01 VITALS — HEIGHT: 66 IN | BODY MASS INDEX: 35.79 KG/M2 | WEIGHT: 222.66 LBS

## 2022-12-01 DIAGNOSIS — I30.0 ACUTE IDIOPATHIC PERICARDITIS: ICD-10-CM

## 2022-12-01 DIAGNOSIS — I31.39 PERICARDIAL EFFUSION: Primary | ICD-10-CM

## 2022-12-01 DIAGNOSIS — R79.89 ELEVATED LFTS: ICD-10-CM

## 2022-12-01 DIAGNOSIS — E06.3 HYPOTHYROIDISM DUE TO HASHIMOTO'S THYROIDITIS: ICD-10-CM

## 2022-12-01 DIAGNOSIS — I31.39 PERICARDIAL EFFUSION: ICD-10-CM

## 2022-12-01 DIAGNOSIS — E03.8 HYPOTHYROIDISM DUE TO HASHIMOTO'S THYROIDITIS: ICD-10-CM

## 2022-12-01 DIAGNOSIS — F32.A DEPRESSION, UNSPECIFIED DEPRESSION TYPE: ICD-10-CM

## 2022-12-01 DIAGNOSIS — Z09 HOSPITAL DISCHARGE FOLLOW-UP: ICD-10-CM

## 2022-12-01 DIAGNOSIS — T38.0X5A ADVERSE EFFECT OF CORTICOSTEROIDS, INITIAL ENCOUNTER: ICD-10-CM

## 2022-12-01 DIAGNOSIS — I10 PRIMARY HYPERTENSION: ICD-10-CM

## 2022-12-01 DIAGNOSIS — R10.13 DYSPEPSIA: Primary | ICD-10-CM

## 2022-12-01 DIAGNOSIS — F41.9 ANXIETY: ICD-10-CM

## 2022-12-01 LAB
ALBUMIN SERPL-MCNC: 4.7 G/DL (ref 3.5–5.2)
ALBUMIN/GLOB SERPL: 1.7 G/DL
ALP SERPL-CCNC: 107 U/L (ref 39–117)
ALT SERPL W P-5'-P-CCNC: 163 U/L (ref 1–33)
ANION GAP SERPL CALCULATED.3IONS-SCNC: 11 MMOL/L (ref 5–15)
AST SERPL-CCNC: 47 U/L (ref 1–32)
BILIRUB SERPL-MCNC: 0.4 MG/DL (ref 0–1.2)
BUN SERPL-MCNC: 23 MG/DL (ref 6–20)
BUN/CREAT SERPL: 18.3 (ref 7–25)
CALCIUM SPEC-SCNC: 10 MG/DL (ref 8.6–10.5)
CHLORIDE SERPL-SCNC: 99 MMOL/L (ref 98–107)
CO2 SERPL-SCNC: 25 MMOL/L (ref 22–29)
CREAT SERPL-MCNC: 1.26 MG/DL (ref 0.57–1)
CRP SERPL-MCNC: <0.3 MG/DL (ref 0–0.5)
EGFRCR SERPLBLD CKD-EPI 2021: 49.3 ML/MIN/1.73
GLOBULIN UR ELPH-MCNC: 2.7 GM/DL
GLUCOSE SERPL-MCNC: 118 MG/DL (ref 65–99)
POTASSIUM SERPL-SCNC: 4.7 MMOL/L (ref 3.5–5.2)
PROT SERPL-MCNC: 7.4 G/DL (ref 6–8.5)
SODIUM SERPL-SCNC: 135 MMOL/L (ref 136–145)
TSH SERPL DL<=0.05 MIU/L-ACNC: 1.04 UIU/ML (ref 0.27–4.2)

## 2022-12-01 PROCEDURE — 84443 ASSAY THYROID STIM HORMONE: CPT

## 2022-12-01 PROCEDURE — 93308 TTE F-UP OR LMTD: CPT

## 2022-12-01 PROCEDURE — 36415 COLL VENOUS BLD VENIPUNCTURE: CPT

## 2022-12-01 PROCEDURE — 99214 OFFICE O/P EST MOD 30 MIN: CPT | Performed by: NURSE PRACTITIONER

## 2022-12-01 PROCEDURE — 99214 OFFICE O/P EST MOD 30 MIN: CPT | Performed by: FAMILY MEDICINE

## 2022-12-01 PROCEDURE — 86140 C-REACTIVE PROTEIN: CPT

## 2022-12-01 PROCEDURE — 80053 COMPREHEN METABOLIC PANEL: CPT

## 2022-12-01 PROCEDURE — 93308 TTE F-UP OR LMTD: CPT | Performed by: INTERNAL MEDICINE

## 2022-12-01 RX ORDER — FLUOXETINE HYDROCHLORIDE 20 MG/1
20 CAPSULE ORAL DAILY
Qty: 90 CAPSULE | Refills: 3 | Status: SHIPPED | OUTPATIENT
Start: 2022-12-01

## 2022-12-01 RX ORDER — BUSPIRONE HYDROCHLORIDE 10 MG/1
10 TABLET ORAL DAILY
COMMUNITY
End: 2023-01-30 | Stop reason: ALTCHOICE

## 2022-12-01 RX ORDER — OMEPRAZOLE 20 MG/1
20 CAPSULE, DELAYED RELEASE ORAL DAILY
Qty: 30 CAPSULE | Refills: 1 | Status: SHIPPED | OUTPATIENT
Start: 2022-12-01 | End: 2023-01-30

## 2022-12-01 NOTE — PROGRESS NOTES
"[facility], [facility] V[facility]    Chief Complaint  Hospital Follow Up Visit and Hypertension    Subjective    History of Present Illness {CC  Problem List  Visit  Diagnosis   Encounters  Notes  Medications  Labs  Result Review Imaging  Media :23}     Razia Sequeira presents to [facility] CARDIOLOGY for   History of Present Illness     58 yo female presented to [facility] with upper respiratory syndrome.  Initially improved and then declined.  She developed shock, pericardial effusion/cardiac tamponade.  Left heart catheterization completed without obstructive CAD.  Status post pericardiocentesis    NO CP or pressure.  Mild dyspnea with exertion.  Improving but not at baseline. Mild dizziness bending.  NO near syncope, syncope.  No fevers or chills.  Increased diaphoresis.     Woke with morning with chest discomfort, indigestion.  PCP called in PPI today. Short lived.     Objective     Vital Signs:   Vitals:    12/01/22 1445 12/01/22 1446 12/01/22 1447   BP: 136/73 131/73 129/75   BP Location: Right arm Left arm Left arm   Patient Position: Sitting Sitting Standing   Cuff Size: Adult Adult Adult   Pulse: 101 102 75   Resp: 17     Temp: 96.9 °F (36.1 °C)     TempSrc: Temporal     SpO2: 98%     Weight: 101 kg (223 lb 9.6 oz)     Height: 167.6 cm (65.98\")       Body mass index is 36.11 kg/m².  Physical Exam  Vitals reviewed.   Constitutional:       General: She is not in acute distress.     Appearance: Normal appearance.   Cardiovascular:      Rate and Rhythm: Normal rate and regular rhythm.      Pulses:           Radial pulses are 2+ on the right side.        Dorsalis pedis pulses are 2+ on the right side.        Posterior tibial pulses are 2+ on the right side.      Heart sounds: Normal heart sounds.   Pulmonary:      Effort: Pulmonary effort is normal.      Breath sounds: Normal breath sounds.   Musculoskeletal:      Right lower leg: No edema.      Left " lower leg: No edema.   Skin:     General: Skin is warm and dry.   Neurological:      Mental Status: She is alert.   Psychiatric:         Mood and Affect: Mood normal.         Behavior: Behavior is cooperative.              Result Review  Data Reviewed:{ Labs  Result Review  Imaging  Med Tab  Media :23}   Echo 11/22/2022: EF 61 to 65%.  Trivial pericardial effusion no evidence of tamponade    Lab Results   Component Value Date    GLUCOSE 85 11/22/2022    BUN 15 11/22/2022    CREATININE 0.74 11/22/2022    EGFRIFNONA >60 05/13/2022    EGFRIFAFRI >60 05/13/2022    BCR 20.3 11/22/2022    K 3.6 11/22/2022    CO2 27.0 11/22/2022    CALCIUM 8.8 11/22/2022    PROTENTOTREF 7.0 04/20/2021    ALBUMIN 3.40 (L) 11/22/2022    LABIL2 1.7 04/20/2021     (H) 11/22/2022    ALT 1,335 (H) 11/22/2022     Lab Results   Component Value Date    WBC 11.35 (H) 11/21/2022    HGB 12.6 11/21/2022    HCT 39.6 11/21/2022    MCV 90.8 11/21/2022     11/21/2022                   Assessment and Plan {CC Problem List  Visit Diagnosis  ROS  Review (Popup)  Health Maintenance  Quality  BestPractice  Medications  SmartSets  SnapShot Encounters  Media :23}   1. Pericardial effusion  Echo and labs today as ordered  Colchichine, prednisone    2. Primary hypertension  Lisinopril/hctz  Continue to monitor.     F/u with PCP and Dr. Tran.  [facility] appointment pending.  F/u H&V center a needed or as determined by cardiology.       Follow Up {Instructions Charge Capture  Follow-up Communications :23}   Return if symptoms worsen or fail to improve.    Patient was given instructions and counseling regarding her condition or for health maintenance advice. Please see specific information pulled into the AVS if appropriate.  Patient was instructed to call the Heart and Valve Center with any questions, concerns, or worsening symptoms.

## 2022-12-01 NOTE — PROGRESS NOTES
"Chief Complaint   Patient presents with   • NP / establish PCP      Last PCP was [facility]    • FU [facility] discharge / pericarditis      Pt is following up w/ cardio later this afternoon        Subjective      Razia Sequeira is a 57 y.o. who presents for follow-up after she was hospitalized for cardiac tamponade from a large pericardial effusion.  Patient was admitted to [facility] on November 18.  She underwent urgent cardiac catheterization due to abnormal appearance of EKG.  She had no coronary disease.  Subsequent echocardiogram revealed large pericardial effusion.  Pericardial drain was placed.  After 4-day hospitalization patient was discharged.  She is on colchicine and prednisone.    Blood pressure was elevated during hospitalization.  Patient reports at home blood pressures have been in the 110s over 70s.    Patient has had some struggles with the prednisone since discharge noting emotional lability and indigestion.  Her indigestion has responded to her 's omeprazole.    Patient has long history of anxiety for which she takes fluoxetine regularly and buspirone as needed.  She is in need of a refill of fluoxetine    Since hospitalization she she notes that she is easily fatigued    The following portions of the patient's history were reviewed and updated as appropriate: allergies, current medications, past family history, past medical history, past social history, past surgical history and problem list.    Review of Systems    Objective   Vital Signs:  /95   Pulse 104   Temp 97.8 °F (36.6 °C)   Resp 20   Ht 167.6 cm (65.98\")   Wt 101 kg (222 lb)   SpO2 96%   BMI 35.85 kg/m²     Class 2 Severe Obesity (BMI >=35 and <=39.9). Obesity-related health conditions include the following: hypertension. Obesity is unchanged. BMI is is above average; BMI management plan is completed. We discussed portion control.        Physical Exam  Constitutional:       Appearance: Normal appearance.   Cardiovascular: "      Rate and Rhythm: Normal rate.      Pulses: Normal pulses.      Heart sounds: Normal heart sounds.   Pulmonary:      Effort: Pulmonary effort is normal.      Breath sounds: Normal breath sounds.   Musculoskeletal:      Cervical back: Normal range of motion and neck supple.   Neurological:      Mental Status: She is alert.          Result Review   The following data was reviewed by: Jeronimo Carias MD on 12/01/2022:  Common labs    Common Labs 11/20/22 11/20/22 11/21/22 11/21/22 11/22/22    0336 0336 0514 0514    Glucose  111 (A)  83 85   BUN  22 (A)  16 15   Creatinine  0.89  0.78 0.74   Sodium  136  136 140   Potassium  4.1  3.7 3.6   Chloride  105  105 102   Calcium  8.4 (A)  8.5 (A) 8.8   Albumin  3.10 (A)  3.00 (A) 3.40 (A)   Total Bilirubin  0.7  0.5 0.4   Alkaline Phosphatase  81  80 89   AST (SGOT)  2,983 (A)  798 (A) 284 (A)   ALT (SGPT)  3,257 (A)  1,995 (A) 1,335 (A)   WBC 16.36 (A)  11.35 (A)     Hemoglobin 12.8  12.6     Hematocrit 39.0  39.6     Platelets 201  195     (A) Abnormal value       Comments are available for some flowsheets but are not being displayed.           Data reviewed: Recent hospitalization notes [facility], November 2022              Assessment and Plan  Diagnoses and all orders for this visit:    1. Dyspepsia (Primary)  Comments:  Start omeprazole while patient continues on prednisone  Orders:  -     omeprazole (priLOSEC) 20 MG capsule; Take 1 capsule by mouth Daily.  Dispense: 30 capsule; Refill: 1    2. Pericardial effusion  Comments:  Cardiology follow-up today with planned labs and limited echo    3. Anxiety  Comments:  Refill fluoxetine for 1 year  Orders:  -     FLUoxetine (PROzac) 20 MG capsule; Take 1 capsule by mouth Daily.  Dispense: 90 capsule; Refill: 3    4. Hypothyroidism due to Hashimoto's thyroiditis  Comments:  TSH during hospitalization within range.  Repeat in 3 months.  Order placed  Orders:  -     TSH; Future    5. Adverse effect of corticosteroids, initial  encounter    6. Depression, unspecified depression type  -     FLUoxetine (PROzac) 20 MG capsule; Take 1 capsule by mouth Daily.  Dispense: 90 capsule; Refill: 3    7. Hospital discharge follow-up            Follow Up  Return in about 6 months (around 6/1/2023) for Recheck.  Patient was given instructions and counseling regarding her condition or for health maintenance advice. Please see specific information pulled into the AVS if appropriate.

## 2022-12-02 ENCOUNTER — TELEPHONE (OUTPATIENT)
Dept: CARDIOLOGY | Facility: CLINIC | Age: 57
End: 2022-12-02

## 2022-12-02 DIAGNOSIS — I10 ESSENTIAL HYPERTENSION: Primary | ICD-10-CM

## 2022-12-02 DIAGNOSIS — I31.39 PERICARDIAL EFFUSION: ICD-10-CM

## 2022-12-02 LAB
BH CV VAS BP RIGHT ARM: NORMAL MMHG
MAXIMAL PREDICTED HEART RATE: 161 BPM
STRESS TARGET HR: 137 BPM

## 2022-12-02 NOTE — TELEPHONE ENCOUNTER
----- Message from Luís Tran MD sent at 12/2/2022  9:59 AM EST -----  Please let patient know echocardiogram looked good.  No recurrent pericardial effusion.  Labs looked okay, her kidneys are little strained again but not as bad as before.  Liver enzymes are much better, near normal. Recommend that she stay extra hydrated.  Repeat BMP in another 2 weeks.  ----- Message -----  From: Lab, Background User  Sent: 12/1/2022   6:41 PM EST  To: Luís Tran MD

## 2022-12-02 NOTE — TELEPHONE ENCOUNTER
Spoke with pt regarding echo and labs results. Gave MJS recommendations. She will get repeat BMP in 2 weeks. No further questions at this time.

## 2022-12-09 DIAGNOSIS — E03.8 OTHER SPECIFIED HYPOTHYROIDISM: ICD-10-CM

## 2022-12-09 RX ORDER — LEVOTHYROXINE SODIUM 0.1 MG/1
100 TABLET ORAL DAILY
Qty: 90 TABLET | Refills: 3 | Status: SHIPPED | OUTPATIENT
Start: 2022-12-09

## 2022-12-09 NOTE — TELEPHONE ENCOUNTER
Caller: Sequeira, Mary LISBETH    Relationship: Self    Best call back number: [phone]    Requested Prescriptions:   Requested Prescriptions     Pending Prescriptions Disp Refills   • levothyroxine (Euthyrox) 100 MCG tablet 90 tablet 3     Sig: Take 1 tablet by mouth Daily.        Pharmacy where request should be sent: [facility] [address] [place] [address] [phone] [facility] [phone] FX     Additional details provided by patient: PATIENT HAS 2 PILLS LEFT.      Does the patient have less than a 3 day supply:  [x] Yes  [] No    Would you like a call back once the refill request has been completed: [] Yes [x] No    If the office needs to give you a call back, can they leave a voicemail: [] Yes [x] No    Zuri Batista   12/09/22 08:13 EST

## 2022-12-31 LAB
MYCOBACTERIUM SPEC CULT: NORMAL
NIGHT BLUE STAIN TISS: NORMAL

## 2023-01-25 NOTE — PROGRESS NOTES
[facility] Cardiology   [address], [address]  [place], [place], [address]    [phone]  [facility]           OUTPATIENT CLINIC FOLLOW UP NOTE    Patient Care Team:  Patient Care Team:  Jeronimo Carias MD as PCP - General (Family Medicine)  Luís Tran MD as Consulting Physician (Cardiology)    Subjective:      Chief Complaint   Patient presents with   • Pericardial Effusion   • Hospital Follow Up Visit       HPI:    Razia Sequeira is a 57 y.o. female.  Cardiac focused, problem list:  1. Respiratory illness 11/2022  2. Chest pain 11/2022, ST elevation in the inferolateral leads, upsloping, possible pericarditis  1. LHC 11/18/2022:  Coronary anatomy with minimal luminal irregularities. Elevated LVEDP of 23 mmHg.  2. Echocardiogram 11/19/2022:  Large (>2cm) circumferential pericardial effusion with evidence of cardiac tamponade.   3. Pericardial effusion with cardiac tamponade  1. Pericardiocentesis 11/19/2022:  Removal of 500 ml of straw-colored, cloudy fluid.   2. Repeat echo, 11/22/2022:  LVEF 61-65%. Trivial pericardial effusion. No evidence of cardiac tamponade.   4. Hypothyroidism  5. Hypertension    Patient presents today for hospital follow up.  She has been doing well from a cardiac standpoint since her hospitalization.  Has had follow-up in the heart valve clinic with repeat limited echo revealing no pericardial effusion.  She recently returned to the gym last week and has been walking on the treadmill without cardiopulmonary symptoms.  Denies chest pain, shortness of breath, palpitations, lower extremity edema, lightheadedness or syncope.    Review of Systems:  As noted above in the HPI     PFSH:  Patient Active Problem List   Diagnosis   • Depression   • Well-controlled hypertension   • Hypothyroidism   • Arthritis   • Anxiety   • Essential hypertension   • Decreased libido   • Numerous skin moles   • Low serum vitamin B12   • Reactive airway disease  "without complication   • Pericardial effusion   • Elevated LFTs (R/O hepatic congestion)   • Acute idiopathic pericarditis         Current Outpatient Medications:   •  albuterol sulfate  (90 Base) MCG/ACT inhaler, INHALE 2 PUFFS BY MOUTH EVERY 4 HOURS AS NEEDED FOR WHEEZING, Disp: 18 g, Rfl: 0  •  EQ Loratadine 10 MG tablet, Take 1 tablet by mouth once daily, Disp: 90 tablet, Rfl: 1  •  FLUoxetine (PROzac) 20 MG capsule, Take 1 capsule by mouth Daily., Disp: 90 capsule, Rfl: 3  •  fluticasone (Flonase) 50 MCG/ACT nasal spray, 1-2 sprays in each nostril daily (Patient taking differently: As Needed. 1-2 sprays in each nostril daily), Disp: 3 g, Rfl: 3  •  levothyroxine (Euthyrox) 100 MCG tablet, Take 1 tablet by mouth Daily., Disp: 90 tablet, Rfl: 3  •  lisinopril-hydrochlorothiazide (PRINZIDE,ZESTORETIC) 20-12.5 MG per tablet, Take 1 tablet by mouth Daily., Disp: 90 tablet, Rfl: 3  •  Magnesium Chloride (MAGNESIUM DR PO), Take  by mouth., Disp: , Rfl:   •  Multiple Vitamins-Minerals (MULTIVITAMIN ADULT PO), Take  by mouth., Disp: , Rfl:   •  Omega-3 Fatty Acids (fish oil) 1000 MG capsule capsule, Take  by mouth Daily With Breakfast., Disp: , Rfl:   •  vitamin C (ASCORBIC ACID) 250 MG tablet, Take 250 mg by mouth Daily., Disp: , Rfl:   •  Zinc Acetate, Oral, (ZINC ACETATE PO), Take  by mouth., Disp: , Rfl:      reports that she has quit smoking. Her smoking use included cigarettes. She has a 0.50 pack-year smoking history. She has never used smokeless tobacco.      Objective:   Physical exam:  /88 (BP Location: Right arm, Patient Position: Sitting)   Pulse 76   Ht 167.6 cm (66\")   Wt 106 kg (233 lb 6.4 oz)   SpO2 96%   BMI 37.67 kg/m²   CONSTITUTIONAL: No acute distress  RESPIRATORY: Normal effort. Clear to auscultation bilaterally without wheezing or rales  CARDIOVASCULAR: Regular rate and rhythm with normal S1 and S2. Without murmur.   PERIPHERAL VASCULAR: No carotid bruit bilaterally.  Normal " radial pulse. There is no lower extremity edema bilaterally.      Labs:    Lab Results   Component Value Date    LDL 97 11/19/2022     No components found for: LDLDIRECTC    Diagnostic Data:    Procedures    C 11/18/2022  •  Coronary anatomy with minimal luminal irregularities  •  Elevated LVEDP of 23 mmHg      TTE 12/01/2022  •  This was a limited echocardiogram to reassess left ventricular function only.  •  Left ventricular systolic function is normal. Left ventricular ejection fraction appears to be 56 - 60%.  •  There is no evidence of pericardial effusion.      Assessment and Plan:     Pericardial effusion  Cardiac tamponade  Acute idiopathic pericarditis   -Status post pericardiocentesis.  C during admission without obstructive CAD.   -Recent repeat echo without evidence of pericardial effusion.  -Currently without dyspnea, chest pain  -Continue to monitor clinically.     RYLEY  -Patient with RYLEY during hospitalization that resolved.    -Repeat blood work revealing slightly elevated creatinine.   -Will repeat BMP today to monitor renal function. We will contact patient with results.     Essential hypertension  -Blood pressure well controlled.  -Continue current related medication.       - Return in about 6 months (around 7/30/2023) for Next scheduled follow up.    Electronically signed by TAMAR Singer, 01/30/23, 8:41 AM EST.

## 2023-01-30 ENCOUNTER — LAB (OUTPATIENT)
Dept: LAB | Facility: HOSPITAL | Age: 58
End: 2023-01-30
Payer: COMMERCIAL

## 2023-01-30 ENCOUNTER — OFFICE VISIT (OUTPATIENT)
Dept: CARDIOLOGY | Facility: CLINIC | Age: 58
End: 2023-01-30
Payer: COMMERCIAL

## 2023-01-30 VITALS
HEIGHT: 66 IN | OXYGEN SATURATION: 96 % | DIASTOLIC BLOOD PRESSURE: 88 MMHG | HEART RATE: 76 BPM | BODY MASS INDEX: 37.51 KG/M2 | WEIGHT: 233.4 LBS | SYSTOLIC BLOOD PRESSURE: 134 MMHG

## 2023-01-30 DIAGNOSIS — I10 ESSENTIAL HYPERTENSION: ICD-10-CM

## 2023-01-30 DIAGNOSIS — I30.0 ACUTE IDIOPATHIC PERICARDITIS: ICD-10-CM

## 2023-01-30 DIAGNOSIS — I10 ESSENTIAL HYPERTENSION: Primary | ICD-10-CM

## 2023-01-30 DIAGNOSIS — I31.39 PERICARDIAL EFFUSION: ICD-10-CM

## 2023-01-30 LAB
ANION GAP SERPL CALCULATED.3IONS-SCNC: 10.7 MMOL/L (ref 5–15)
BUN SERPL-MCNC: 16 MG/DL (ref 6–20)
BUN/CREAT SERPL: 16.5 (ref 7–25)
CALCIUM SPEC-SCNC: 10 MG/DL (ref 8.6–10.5)
CHLORIDE SERPL-SCNC: 102 MMOL/L (ref 98–107)
CO2 SERPL-SCNC: 27.3 MMOL/L (ref 22–29)
CREAT SERPL-MCNC: 0.97 MG/DL (ref 0.57–1)
EGFRCR SERPLBLD CKD-EPI 2021: 67.5 ML/MIN/1.73
GLUCOSE SERPL-MCNC: 111 MG/DL (ref 65–99)
POTASSIUM SERPL-SCNC: 4.5 MMOL/L (ref 3.5–5.2)
SODIUM SERPL-SCNC: 140 MMOL/L (ref 136–145)

## 2023-01-30 PROCEDURE — 99213 OFFICE O/P EST LOW 20 MIN: CPT | Performed by: INTERNAL MEDICINE

## 2023-01-30 PROCEDURE — 80048 BASIC METABOLIC PNL TOTAL CA: CPT

## 2023-01-30 PROCEDURE — 36415 COLL VENOUS BLD VENIPUNCTURE: CPT

## 2023-03-08 ENCOUNTER — TRANSCRIBE ORDERS (OUTPATIENT)
Dept: PHYSICAL THERAPY | Facility: CLINIC | Age: 58
End: 2023-03-08
Payer: COMMERCIAL

## 2023-03-08 ENCOUNTER — TRANSCRIBE ORDERS (OUTPATIENT)
Dept: ADMINISTRATIVE | Facility: HOSPITAL | Age: 58
End: 2023-03-08
Payer: COMMERCIAL

## 2023-03-08 DIAGNOSIS — Z78.0 POST-MENOPAUSAL: Primary | ICD-10-CM

## 2023-03-08 DIAGNOSIS — R10.2 PELVIC PAIN: Primary | ICD-10-CM

## 2023-04-25 ENCOUNTER — TREATMENT (OUTPATIENT)
Dept: PHYSICAL THERAPY | Facility: CLINIC | Age: 58
End: 2023-04-25
Payer: COMMERCIAL

## 2023-04-25 DIAGNOSIS — M62.89 PELVIC FLOOR DYSFUNCTION: Primary | ICD-10-CM

## 2023-04-25 DIAGNOSIS — R39.15 URGENCY OF URINATION: ICD-10-CM

## 2023-04-25 NOTE — PROGRESS NOTES
Physical Therapy Initial Evaluation and Plan of Care  [address], [address]; [place] [address]      Patient: Razia Sequeira   : 1965  Diagnosis/ICD-10 Code:  Pelvic floor dysfunction [M62.89]  Referring practitioner: Savita Núñez MD  Date of Initial Visit: 2023  Today's Date: 2023  Patient seen for 1 sessions    Subjective   On way to bathroom; if don't run, will not get there,   Almost peeing before getting to the toilet.  Over 6 months.      Chief Complaint:   Chief Complaint   Patient presents with   • Initial Evaluation      Functional Outcome Measure: PFDI-20 score: 10 %    Pain  Denies pelvic pain    Bladder function:    Incontinence: yes  # of pads in 24 hours: pantyliner   How soaked is pad when changed: some days dry, some days damp  What specifically makes you leak: trying to get to the bathroom  Leak at night: no  Urination at night: occ  Use bathroom “just in case”: no  Strong urinary urge: yes  Leaking with urge: yes  Urge triggers: no  Complete bladder voiding %: yes  Post-micturition dribble: no  Frequency of urination: every 3-4  Fluid irritants consumed daily: 2-3 32 oz water; 1 cup coffee in AM: tea occ.    Bowel function:    How many episodes of leakage/month: no  How many BM's/day: daily  Smyth Stool Scale Type: 3-4  Discomfort with BM: no  Complete bowel voiding %: yes  Assistance to pass stool: no  Diet: good fiber  Incontinence with gas: no  Ability to pass gas: yes    Sexual activity  Sexually active: yes, no discomfort.  Lubrication: dryness occ, no lub    Hx of sexual trauma: no      Diagnostics:    PMH:   Past Medical History:   Diagnosis Date   • Allergy    • Anxiety    • Hypertension    • Ovarian cyst         Surgical HX:   Past Surgical History:   Procedure Laterality Date   • BREAST SURGERY      breast reduction   • CARDIAC CATHETERIZATION N/A 2022    Procedure: Left Heart Cath;  Surgeon: Luís Tran MD;  Location: [facility]  LOCATION;  Service: Cardiovascular;  Laterality: N/A;   • CARDIAC CATHETERIZATION N/A 11/19/2022    Procedure: PERICARDIOCENTESIS;  Surgeon: Luís Tran MD;  Location: [facility] INVASIVE LOCATION;  Service: Cardiology;  Laterality: N/A;   • HYSTERECTOMY      age 39   • OOPHORECTOMY     • REDUCTION MAMMAPLASTY Bilateral 1998   • TONSILLECTOMY          Menstrual cycle: hysterectomy;     Birth Hx: 3 pregnancies; 1 miscarriage, 2 vaginal deliveries; no complications.    Meds:   Current Outpatient Medications:   •  albuterol sulfate  (90 Base) MCG/ACT inhaler, INHALE 2 PUFFS BY MOUTH EVERY 4 HOURS AS NEEDED FOR WHEEZING, Disp: 18 g, Rfl: 0  •  EQ Loratadine 10 MG tablet, Take 1 tablet by mouth once daily, Disp: 90 tablet, Rfl: 1  •  FLUoxetine (PROzac) 20 MG capsule, Take 1 capsule by mouth Daily., Disp: 90 capsule, Rfl: 3  •  fluticasone (Flonase) 50 MCG/ACT nasal spray, 1-2 sprays in each nostril daily (Patient taking differently: As Needed. 1-2 sprays in each nostril daily), Disp: 3 g, Rfl: 3  •  levothyroxine (Euthyrox) 100 MCG tablet, Take 1 tablet by mouth Daily., Disp: 90 tablet, Rfl: 3  •  lisinopril-hydrochlorothiazide (PRINZIDE,ZESTORETIC) 20-12.5 MG per tablet, Take 1 tablet by mouth Daily., Disp: 90 tablet, Rfl: 3  •  Magnesium Chloride (MAGNESIUM DR PO), Take  by mouth., Disp: , Rfl:   •  Multiple Vitamins-Minerals (MULTIVITAMIN ADULT PO), Take  by mouth., Disp: , Rfl:   •  Omega-3 Fatty Acids (fish oil) 1000 MG capsule capsule, Take  by mouth Daily With Breakfast., Disp: , Rfl:   •  vitamin C (ASCORBIC ACID) 250 MG tablet, Take 250 mg by mouth Daily., Disp: , Rfl:   •  Zinc Acetate, Oral, (ZINC ACETATE PO), Take  by mouth., Disp: , Rfl:      Occupation: retired from hospice    Activity level/exercise routine: walk, not faithfully exercising; has gym membership    Objective    Patient independently ambulates into clinic.     Verbal consent obtained for internal pelvic exam/treatment; declined  need for second person in room  Posture:ant pelvic tilt, increased lumbar lordosis, forward head, rounded shoulders  Bony Landmarks: level  Palpation: Tender L QL  Squat full  Flexion: full  Seated LE MMT: 4/5    Supine:   Infrasternal Angle: >90  Breathing pattern:  shallow  Abdominals: WNl  DANIA:  - FW-umbilicus,   - FW 5cm above   - FW 5cm below  - Tension   + Doming Noted  Iliacus : Unremarkable for tension B    PELVIC FLOOR ASSESSMENT  External:    Sensation: Intact internally and externally B to light touch / pressure    Skin quality/Color/Atrophy: WNL   Ischiocavernosus: WNL   Transverse perineal muscle: WNL   Perineal body: WNL    Scar:assessment: -   PFC: +   Cough Reflex: +   Prolapse with cough/bear down: -   Decent with Bear down: +   Urethral Caruncle: -       Internal:   Wall Laxity: -   Internal superficial perineal body:-   Perineal body mobility: +  Tension/Trigger Points:   Levator ani: Tension mild L   Obturator internus: -   Compressor urethra: + urge    PERF SCALE:  Power: 3+  Endurance: 10  Repetitions: 4  Fast twitch: 8  PFM:  Recruitment: good  Derecruitment: good    See flowsheet for details of treatment following evaluation.    Basic information was provided regarding pelvic floor anatomy, explanation of the function of the pelvic floor, interaction between diaphragm and PFM. Patient was provided with a bladder diary to be completed and returned to next visit. Instruction began regarding fluid intake, micturition and defecation behavior, and use of squatty potty.    Assessment/Plan:     Assessment/Plan    The patient is a 57 y.o. female who presents to physical therapy today for pelvic floor dysfunction. Upon initial evaluation, the patient demonstrates the following impairments: urinary urgency, pelvic floor tension, dysfunction. Due to these impairments, the patient is unable to control strong urinary urges, with occassional leakage. The patient would benefit from skilled pelvic PT  services to address functional limitations and impairments and to improve patient quality of life.      Goals:   STG's: 4 weeks  · Patient will report > 50% improvement in urinary symptoms for improved QOL  · Patient will be able to actively and consistently contract PFM 5/5 trials for progress toward LTG  · Patient will be independent with prolapse precautions and pelvic brace with lifting  · Patient will be able to perform HEP with minimal verbal cues    LTG's: By discharge  · Patient will report >75% improvement in urinary symptoms   · Patient will report an elimination of pad usage with no incontinence x 2-3 weeks for improved QOL  · Patient will report an elimination of urinary urgency   · Patient will report an elimination of nocturia  · Patient will report improved voiding frequency to once every 3-4 hours for improved function with ADLs.  · Patient will be independent with HEP    Plan  Therapy options: will be seen for skilled physical therapy services  Planned modality interventions: TENS, ultrasound, cryotherapy, thermotherapy (hydrocollator packs)  Planned therapy interventions: abdominal trunk stabilization, manual therapy, neuromuscular re-education, body mechanics training, flexibility, functional ROM exercises, gait training, home exercise program, joint mobilization, therapeutic activities, stretching, strengthening, spinal/joint mobilization, soft tissue mobilization and postural training, dry needling.  Pt prognosis: good  Frequency: every 2 weeks,   Duration in visits: 6  Duration in weeks: 12  Treatment plan discussed with: patient    Treatment Time: 1040 - 1120  Timed:         Manual Therapy:    0     mins  96815;     Therapeutic Exercise:    0     mins  37674;     Neuromuscular Raza:    0    mins  50007;    Therapeutic Activity:     15     mins  40199;     Gait Trainin     mins  36565;     Ultrasound:     0     mins  00234;    Ionto                               0    mins   76540  Self  Care                       0     mins   47077  Canalith Repos    0     mins 31041      Un-Timed:  Electrical Stimulation:    0     mins  74415 ( );  Dry Needling     0     mins self-pay  Traction     0     mins 56861  Low Eval     0     Mins  82375  Mod Eval     25     Mins  48991  High Eval                       0     Mins  98010  Re-Eval                           0    mins  98609        Timed Treatment:   15   mins   Total Treatment:     40   mins    PT SIGNATURE:Fariha Marroquinra, CHAS  [place] License: [id number]    DATE TREATMENT INITIATED: 4/25/2023    Initial Certification  Certification Period: 7/24/2023  I certify that the therapy services are furnished while this patient is under my care.  The services outlined above are required by this patient, and will be reviewed every 90 days.     PHYSICIAN: Savita Núñez MD  NPI: [id number]       DATE: 04/25/2023     Please sign and return via fax to [phone]. Thank you, [facility].

## 2023-05-23 ENCOUNTER — TREATMENT (OUTPATIENT)
Dept: PHYSICAL THERAPY | Facility: CLINIC | Age: 58
End: 2023-05-23
Payer: COMMERCIAL

## 2023-05-23 DIAGNOSIS — R39.15 URGENCY OF URINATION: Primary | ICD-10-CM

## 2023-05-23 DIAGNOSIS — M62.89 PELVIC FLOOR DYSFUNCTION: ICD-10-CM

## 2023-05-23 NOTE — PROGRESS NOTES
PROGRESS NOTE  Physical Therapy Daily Treatment Note  [address], [address]; [place] [address]        Patient: Razia Sequeira   : 1965  Referring practitioner: Savita Núñez MD  Date of Initial Visit: Type: THERAPY  Noted: 2023  Today's Date: 2023  Patient seen for 2 sessions       Visit Diagnoses:    ICD-10-CM ICD-9-CM   1. Urgency of urination  R39.15 788.63   2. Pelvic floor dysfunction  M62.89 618.83       Subjective Noticed improvement with urgency.  Only 1 leakage.  Delayed urination; urge suppression.  Doing kegels laying down.    Drinking 70+ ounces of water    Decreasing nocturia; 0-1x night.    Walking regularly;    PFDI-20 9%    Objective   See Exercise, Manual, and Modality Logs for complete treatment.     verbal consent obtained for internal pelvic exam/treatment; declined need for second person in room     Assessment/Plan  Reports 75% improvement with urinary symptoms.  Cont POC with 1-2 more visits over the next 2 months.  HEP advanced.  Patient to purchase squatty potty.  To incorporate pelvic brace with work activities;     Goals:   STG's: 4 weeks  • Patient will report > 50% improvement in urinary symptoms for improved QOL MET  • Patient will be able to actively and consistently contract PFM 5/5 trials for progress toward LTG MET  • Patient will be independent with prolapse precautions and pelvic brace with lifting Partially MET  • Patient will be able to perform HEP with minimal verbal cues MET     LTG's: By discharge  • Patient will report >75% improvement in urinary symptoms MET  • Patient will report an elimination of pad usage with no incontinence x 2-3 weeks for improved QOL partially Met, only 1 episode of leakage in   • Patient will report an elimination of urinary urgency MET  • Patient will report an elimination of nocturia Partially MET< 0-1 x   • Patient will report improved voiding frequency to once every 3-4 hours for improved function with ADLs.  MET  • Patient will be independent with HEP Partially MET       Timed:  859  941     Manual Therapy:    0     mins  90187;     Therapeutic Exercise:    15     mins  95943;     Neuromuscular Raza:    0    mins  37466;    Therapeutic Activity:     26     mins  39545;     Gait Trainin     mins  71804;     Ultrasound:     0     mins  44351;    Ionto                               0    mins   39108  Self Care                       0     mins   15702  Canalith Repos    0     mins 30526      Un-Timed:  Electrical Stimulation:    0     mins  70125 ( );  Dry Needling     0     mins self-pay  Traction     0     mins 96306      Timed Treatment:   41   mins   Total Treatment:     41   mins    Fariha Carlin PT  [place] License: [id number]

## 2023-06-01 ENCOUNTER — OFFICE VISIT (OUTPATIENT)
Dept: FAMILY MEDICINE CLINIC | Facility: CLINIC | Age: 58
End: 2023-06-01

## 2023-06-01 VITALS
BODY MASS INDEX: 37.45 KG/M2 | WEIGHT: 233 LBS | SYSTOLIC BLOOD PRESSURE: 120 MMHG | RESPIRATION RATE: 20 BRPM | HEART RATE: 96 BPM | OXYGEN SATURATION: 96 % | DIASTOLIC BLOOD PRESSURE: 82 MMHG | TEMPERATURE: 98.7 F | HEIGHT: 66 IN

## 2023-06-01 DIAGNOSIS — R79.89 ELEVATED LFTS: ICD-10-CM

## 2023-06-01 DIAGNOSIS — I10 WELL-CONTROLLED HYPERTENSION: Primary | ICD-10-CM

## 2023-06-01 DIAGNOSIS — J30.89 ENVIRONMENTAL AND SEASONAL ALLERGIES: ICD-10-CM

## 2023-06-01 DIAGNOSIS — L65.9 HAIR LOSS: ICD-10-CM

## 2023-06-01 DIAGNOSIS — E03.8 HYPOTHYROIDISM DUE TO HASHIMOTO'S THYROIDITIS: ICD-10-CM

## 2023-06-01 DIAGNOSIS — F41.9 ANXIETY: ICD-10-CM

## 2023-06-01 DIAGNOSIS — E06.3 HYPOTHYROIDISM DUE TO HASHIMOTO'S THYROIDITIS: ICD-10-CM

## 2023-06-01 LAB
ALBUMIN SERPL-MCNC: 4.6 G/DL (ref 3.5–5.2)
ALBUMIN/GLOB SERPL: 1.7 G/DL
ALP SERPL-CCNC: 103 U/L (ref 39–117)
ALT SERPL-CCNC: 49 U/L (ref 1–33)
AST SERPL-CCNC: 40 U/L (ref 1–32)
BILIRUB SERPL-MCNC: 0.5 MG/DL (ref 0–1.2)
BUN SERPL-MCNC: 17 MG/DL (ref 6–20)
BUN/CREAT SERPL: 16 (ref 7–25)
CALCIUM SERPL-MCNC: 10.5 MG/DL (ref 8.6–10.5)
CHLORIDE SERPL-SCNC: 101 MMOL/L (ref 98–107)
CHOLEST SERPL-MCNC: 249 MG/DL (ref 0–200)
CO2 SERPL-SCNC: 28.2 MMOL/L (ref 22–29)
CREAT SERPL-MCNC: 1.06 MG/DL (ref 0.57–1)
EGFRCR SERPLBLD CKD-EPI 2021: 60.6 ML/MIN/1.73
FERRITIN SERPL-MCNC: 162 NG/ML (ref 13–150)
GLOBULIN SER CALC-MCNC: 2.7 GM/DL
GLUCOSE SERPL-MCNC: 99 MG/DL (ref 65–99)
HDLC SERPL-MCNC: 56 MG/DL (ref 40–60)
IRON SATN MFR SERPL: 24 % (ref 20–50)
IRON SERPL-MCNC: 96 MCG/DL (ref 37–145)
LDLC SERPL CALC-MCNC: 163 MG/DL (ref 0–100)
POTASSIUM SERPL-SCNC: 4.3 MMOL/L (ref 3.5–5.2)
PROT SERPL-MCNC: 7.3 G/DL (ref 6–8.5)
SODIUM SERPL-SCNC: 140 MMOL/L (ref 136–145)
TIBC SERPL-MCNC: 396 MCG/DL
TRIGL SERPL-MCNC: 167 MG/DL (ref 0–150)
TSH SERPL DL<=0.005 MIU/L-ACNC: 0.15 UIU/ML (ref 0.27–4.2)
UIBC SERPL-MCNC: 300 MCG/DL (ref 112–346)
VLDLC SERPL CALC-MCNC: 30 MG/DL (ref 5–40)

## 2023-06-01 PROCEDURE — 99214 OFFICE O/P EST MOD 30 MIN: CPT | Performed by: FAMILY MEDICINE

## 2023-06-01 RX ORDER — LISINOPRIL AND HYDROCHLOROTHIAZIDE 20; 12.5 MG/1; MG/1
1 TABLET ORAL DAILY
Qty: 90 TABLET | Refills: 3 | Status: SHIPPED | OUTPATIENT
Start: 2023-06-01

## 2023-06-01 RX ORDER — FLUTICASONE PROPIONATE 50 MCG
SPRAY, SUSPENSION (ML) NASAL
Qty: 3 G | Refills: 3 | Status: SHIPPED | OUTPATIENT
Start: 2023-06-01

## 2023-06-01 NOTE — PROGRESS NOTES
"Chief Complaint   Patient presents with   • Follow-up   • Hypothyroidism   • dyspepsia   • Anxiety   • Depression   • lab work / pt is fasting        Subjective      Razia Sequeira is a 57 y.o. who presents for hypertension, hypothyroidism, dyspepsia, anxiety.    Hypertension.  Blood pressures been well controlled on current regimen.  Denies side effects of medicines.    Hypothyroidism.  Last TSH  was within range.  Patient needs refill on levothyroxine today.    Dyspepsia.  Resolved and short-term use of omeprazole.    Anxiety.  Patient reports recent increase in anxiety.  She has continued to take Prozac 20 mg daily.  She previously used BuSpar as well.  She feels like allergies, which are not always well controlled, can contribute to her anxiety and she will notice sensation of chest tightness.  The chest tightness can be alleviated with use of her albuterol.    She does note an additional concern over hair loss.  She initially believed this may be a thyroid related problem but someone else is informed her it may be related to her hospitalization back in October.    The following portions of the patient's history were reviewed and updated as appropriate: allergies, current medications, past family history, past medical history, past social history, past surgical history and problem list.    Review of Systems    Objective   Vital Signs:  /82   Pulse 96   Temp 98.7 °F (37.1 °C)   Resp 20   Ht 167.6 cm (65.98\")   Wt 106 kg (233 lb)   SpO2 96%   BMI 37.63 kg/m²     Class 2 Severe Obesity (BMI >=35 and <=39.9). Obesity-related health conditions include the following: hypertension. Obesity is unchanged. BMI is is above average; BMI management plan is completed. We discussed increasing exercise.        Physical Exam  Vitals reviewed.   Constitutional:       Appearance: Normal appearance.   HENT:      Head: Normocephalic and atraumatic.      Right Ear: Tympanic membrane normal.      Left Ear: Tympanic " membrane normal.      Nose: Nose normal.      Mouth/Throat:      Mouth: Mucous membranes are moist.      Pharynx: Oropharynx is clear.   Cardiovascular:      Rate and Rhythm: Normal rate and regular rhythm.      Pulses: Normal pulses.      Heart sounds: Normal heart sounds.   Pulmonary:      Effort: Pulmonary effort is normal.      Breath sounds: Normal breath sounds.   Neurological:      Mental Status: She is alert.          Result Review   The following data was reviewed by: Jeronimo Carias MD on 06/01/2023:  CMP        11/22/2022    11:33 12/1/2022    15:29 1/30/2023    08:47   CMP   Glucose 85   118   111     BUN 15   23   16     Creatinine 0.74   1.26   0.97     EGFR 93.3   49.3   67.5     Sodium 140   135   140     Potassium 3.6   4.7   4.5     Chloride 102   99   102     Calcium 8.8   10.0   10.0     Total Protein 6.9   7.4      Albumin 3.40   4.70      Globulin 3.5   2.7      Total Bilirubin 0.4   0.4      Alkaline Phosphatase 89   107      AST (SGOT) 284   47      ALT (SGPT) 1,335   163      Albumin/Globulin Ratio 1.0   1.7      BUN/Creatinine Ratio 20.3   18.3   16.5     Anion Gap 11.0   11.0   10.7       TSH        11/18/2022    21:00 12/1/2022    15:29   TSH   TSH 3.920   1.040       BMP        11/22/2022    11:33 12/1/2022    15:29 1/30/2023    08:47   BMP   BUN 15   23   16     Creatinine 0.74   1.26   0.97     Sodium 140   135   140     Potassium 3.6   4.7   4.5     Chloride 102   99   102     CO2 27.0   25.0   27.3     Calcium 8.8   10.0   10.0                     Assessment and Plan  Diagnoses and all orders for this visit:    1. Well-controlled hypertension (Primary)  Comments:  Well-controlled.  Refill antihypertensives.  Surveillance labs ordered  Orders:  -     lisinopril-hydrochlorothiazide (PRINZIDE,ZESTORETIC) 20-12.5 MG per tablet; Take 1 tablet by mouth Daily.  Dispense: 90 tablet; Refill: 3  -     Comprehensive Metabolic Panel  -     Lipid Panel    2. Hypothyroidism due to  Hashimoto's thyroiditis  Comments:  Stable.  Continue levothyroxine.  Surveillance TSH ordered  Orders:  -     TSH    3. Environmental and seasonal allergies  Comments:  Not well controlled.  Continue antihistamine and refilled patient's nasal steroid spray  Orders:  -     fluticasone (Flonase) 50 MCG/ACT nasal spray; 1-2 sprays in each nostril daily  Dispense: 3 g; Refill: 3    4. Hair loss  Comments:  Iron labs have been ordered.  Suspect hair loss is secondary to hospitalization stress  Orders:  -     TSH  -     Ferritin  -     Iron Profile    5. Elevated LFTs (R/O hepatic congestion)  Comments:  Repeat liver functions  Orders:  -     Comprehensive Metabolic Panel    6. Anxiety  Comments:  Patient will resume home BuSpar 5 mg 2-3 times daily    MDM: Moderate complexity due to management of multiple chronic conditions.        Follow Up  Return in about 6 months (around 12/1/2023) for Annual.  Patient was given instructions and counseling regarding her condition or for health maintenance advice. Please see specific information pulled into the AVS if appropriate.

## 2023-06-02 DIAGNOSIS — E78.00 HYPERCHOLESTEROLEMIA: ICD-10-CM

## 2023-06-02 DIAGNOSIS — E03.8 OTHER SPECIFIED HYPOTHYROIDISM: Primary | ICD-10-CM

## 2023-06-02 RX ORDER — ROSUVASTATIN CALCIUM 5 MG/1
5 TABLET, COATED ORAL DAILY
Qty: 90 TABLET | Refills: 1 | Status: SHIPPED | OUTPATIENT
Start: 2023-06-02

## 2023-06-02 RX ORDER — LEVOTHYROXINE SODIUM 88 UG/1
88 TABLET ORAL DAILY
Qty: 90 TABLET | Refills: 1 | Status: SHIPPED | OUTPATIENT
Start: 2023-06-02

## 2023-07-26 ENCOUNTER — PATIENT MESSAGE (OUTPATIENT)
Dept: FAMILY MEDICINE CLINIC | Facility: CLINIC | Age: 58
End: 2023-07-26
Payer: COMMERCIAL

## 2023-07-26 DIAGNOSIS — E03.8 OTHER SPECIFIED HYPOTHYROIDISM: Primary | ICD-10-CM

## 2023-07-27 ENCOUNTER — LAB (OUTPATIENT)
Dept: FAMILY MEDICINE CLINIC | Facility: CLINIC | Age: 58
End: 2023-07-27
Payer: COMMERCIAL

## 2023-07-27 DIAGNOSIS — E03.8 OTHER SPECIFIED HYPOTHYROIDISM: ICD-10-CM

## 2023-07-28 LAB — TSH SERPL DL<=0.005 MIU/L-ACNC: 1.54 UIU/ML (ref 0.27–4.2)

## 2023-09-08 DIAGNOSIS — E03.8 OTHER SPECIFIED HYPOTHYROIDISM: ICD-10-CM

## 2023-09-08 RX ORDER — LEVOTHYROXINE SODIUM 88 UG/1
88 TABLET ORAL DAILY
Qty: 90 TABLET | Refills: 1 | OUTPATIENT
Start: 2023-09-08

## 2023-10-25 ENCOUNTER — OFFICE VISIT (OUTPATIENT)
Dept: CARDIOLOGY | Facility: CLINIC | Age: 58
End: 2023-10-25
Payer: COMMERCIAL

## 2023-10-25 VITALS
OXYGEN SATURATION: 98 % | HEIGHT: 66 IN | HEART RATE: 61 BPM | BODY MASS INDEX: 33.75 KG/M2 | DIASTOLIC BLOOD PRESSURE: 66 MMHG | SYSTOLIC BLOOD PRESSURE: 112 MMHG | WEIGHT: 210 LBS

## 2023-10-25 DIAGNOSIS — I30.0 IDIOPATHIC PERICARDITIS, UNSPECIFIED CHRONICITY: ICD-10-CM

## 2023-10-25 DIAGNOSIS — I31.39 PERICARDIAL EFFUSION: Primary | ICD-10-CM

## 2023-10-25 PROCEDURE — 99213 OFFICE O/P EST LOW 20 MIN: CPT | Performed by: INTERNAL MEDICINE

## 2023-10-25 NOTE — PROGRESS NOTES
[facility] Cardiology   [address], [address]  [place], [place], [address]    [phone]  [facility]           OUTPATIENT CLINIC FOLLOW UP NOTE    Patient Care Team:  Patient Care Team:  Jeronimo Carias MD as PCP - General (Family Medicine)  Luís Tran MD as Consulting Physician (Cardiology)    Subjective:      Chief Complaint   Patient presents with    Pericardial Effusion    Hypertension       HPI:    Razia Sequeira is a 58 y.o. female.  Cardiac focused, problem list:  Respiratory illness 11/2022  Chest pain 11/2022, ST elevation in the inferolateral leads, upsloping, possible pericarditis  LHC 11/18/2022:  Coronary anatomy with minimal luminal irregularities. Elevated LVEDP of 23 mmHg.  Echocardiogram 11/19/2022:  Large (>2cm) circumferential pericardial effusion with evidence of cardiac tamponade.   Pericardial effusion with cardiac tamponade  Pericardiocentesis 11/19/2022:  Removal of 500 ml of straw-colored, cloudy fluid.   Repeat echo, 11/22/2022:  LVEF 61-65%. Trivial pericardial effusion. No evidence of cardiac tamponade.   Hypothyroidism  Hypertension    Patient presents today for follow up.      Active without cardiopulmonary symptoms.  Denies chest pain, shortness of breath, palpitations, lower extremity edema, lightheadedness or syncope.    Review of Systems:  As noted above in the HPI     PFSH:  Patient Active Problem List   Diagnosis    Depression    Well-controlled hypertension    Hypothyroidism    Arthritis    Anxiety    Essential hypertension    Decreased libido    Numerous skin moles    Low serum vitamin B12    Reactive airway disease without complication    Pericardial effusion    Elevated LFTs (R/O hepatic congestion)    Acute idiopathic pericarditis         Current Outpatient Medications:     albuterol sulfate  (90 Base) MCG/ACT inhaler, INHALE 2 PUFFS BY MOUTH EVERY 4 HOURS AS NEEDED FOR WHEEZING, Disp: 18 g, Rfl: 0    EQ  "Loratadine 10 MG tablet, Take 1 tablet by mouth once daily, Disp: 90 tablet, Rfl: 1    FLUoxetine (PROzac) 20 MG capsule, Take 1 capsule by mouth Daily., Disp: 90 capsule, Rfl: 3    fluticasone (Flonase) 50 MCG/ACT nasal spray, 1-2 sprays in each nostril daily, Disp: 3 g, Rfl: 3    levothyroxine (Euthyrox) 88 MCG tablet, Take 1 tablet by mouth Daily., Disp: 90 tablet, Rfl: 1    lisinopril-hydrochlorothiazide (PRINZIDE,ZESTORETIC) 20-12.5 MG per tablet, Take 1 tablet by mouth Daily., Disp: 90 tablet, Rfl: 3    Magnesium Chloride (MAGNESIUM DR PO), Take 1 tablet by mouth Daily., Disp: , Rfl:     Multiple Vitamins-Minerals (MULTIVITAMIN ADULT PO), Take 1 tablet by mouth Daily., Disp: , Rfl:     rosuvastatin (Crestor) 5 MG tablet, Take 1 tablet by mouth Daily., Disp: 90 tablet, Rfl: 1     reports that she has quit smoking. Her smoking use included cigarettes. She has a 0.50 pack-year smoking history. She has never used smokeless tobacco.      Objective:   Physical exam:  /66 (BP Location: Right arm, Patient Position: Sitting)   Pulse 61   Ht 167.6 cm (66\")   Wt 95.3 kg (210 lb) Comment: Pt refused to get on the scale and told me her Wt  SpO2 98%   BMI 33.89 kg/m²   CONSTITUTIONAL: No acute distress  CARDIOVASCULAR: Regular rate and rhythm with normal S1 and S2. Without murmur.   PERIPHERAL VASCULAR: No carotid bruit bilaterally.  Normal radial pulse.     Labs:    Lab Results   Component Value Date     (H) 06/01/2023     No components found for: \"LDLDIRECTC\"    Diagnostic Data:    Procedures    LHC 11/18/2022    Coronary anatomy with minimal luminal irregularities    Elevated LVEDP of 23 mmHg    TTE 12/01/2022    This was a limited echocardiogram to reassess left ventricular function only.    Left ventricular systolic function is normal. Left ventricular ejection fraction appears to be 56 - 60%.    There is no evidence of pericardial effusion.      Assessment and Plan:     Pericardial effusion  Cardiac " tamponade  Acute idiopathic pericarditis   -Status post pericardiocentesis.  LHC during admission without obstructive CAD.   -Repeat echo without evidence of pericardial effusion.  -Without recurrent symptoms     -No additional cardiac work-up/management recommended at this time    - Return if symptoms worsen or fail to improve.

## 2023-11-29 DIAGNOSIS — E03.8 OTHER SPECIFIED HYPOTHYROIDISM: ICD-10-CM

## 2023-11-29 RX ORDER — LEVOTHYROXINE SODIUM 88 UG/1
88 TABLET ORAL DAILY
Qty: 90 TABLET | Refills: 0 | Status: SHIPPED | OUTPATIENT
Start: 2023-11-29

## 2024-01-26 ENCOUNTER — OFFICE VISIT (OUTPATIENT)
Dept: FAMILY MEDICINE CLINIC | Facility: CLINIC | Age: 59
End: 2024-01-26
Payer: COMMERCIAL

## 2024-01-26 ENCOUNTER — HOSPITAL ENCOUNTER (OUTPATIENT)
Dept: GENERAL RADIOLOGY | Facility: HOSPITAL | Age: 59
Discharge: HOME | End: 2024-01-26
Admitting: FAMILY MEDICINE
Payer: COMMERCIAL

## 2024-01-26 VITALS
RESPIRATION RATE: 20 BRPM | BODY MASS INDEX: 36.42 KG/M2 | HEIGHT: 66 IN | TEMPERATURE: 97.5 F | SYSTOLIC BLOOD PRESSURE: 115 MMHG | OXYGEN SATURATION: 98 % | HEART RATE: 90 BPM | WEIGHT: 226.6 LBS | DIASTOLIC BLOOD PRESSURE: 80 MMHG

## 2024-01-26 DIAGNOSIS — E03.8 HYPOTHYROIDISM DUE TO HASHIMOTO'S THYROIDITIS: ICD-10-CM

## 2024-01-26 DIAGNOSIS — I10 PRIMARY HYPERTENSION: ICD-10-CM

## 2024-01-26 DIAGNOSIS — E06.3 HYPOTHYROIDISM DUE TO HASHIMOTO'S THYROIDITIS: ICD-10-CM

## 2024-01-26 DIAGNOSIS — M79.672 CHRONIC HEEL PAIN, LEFT: ICD-10-CM

## 2024-01-26 DIAGNOSIS — Z00.00 ANNUAL PHYSICAL EXAM: Primary | ICD-10-CM

## 2024-01-26 DIAGNOSIS — E78.00 HYPERCHOLESTEROLEMIA: ICD-10-CM

## 2024-01-26 DIAGNOSIS — G89.29 CHRONIC HEEL PAIN, LEFT: ICD-10-CM

## 2024-01-26 DIAGNOSIS — K76.0 NAFLD (NONALCOHOLIC FATTY LIVER DISEASE): ICD-10-CM

## 2024-01-26 PROCEDURE — 73630 X-RAY EXAM OF FOOT: CPT

## 2024-01-26 PROCEDURE — 99396 PREV VISIT EST AGE 40-64: CPT | Performed by: FAMILY MEDICINE

## 2024-01-26 RX ORDER — CHLORAL HYDRATE 500 MG
1000 CAPSULE ORAL
COMMUNITY

## 2024-01-26 NOTE — PROGRESS NOTES
"Chief Complaint   Patient presents with    Annual Exam     No PAP, pt has GYN       Subjective      Razia Sequeira is a 58 y.o. who presents for Annual Physical.     Sleep. 7-8    Physical Activity: Minimal during cold months and limited by her foot. Does household chores. Part time job is mostly standing.    Diet: Minimal fast food. Majority of meals are home cooked.    New Problem: Left foot pain suspect plantar fascitis    The following portions of the patient's history were reviewed and updated as appropriate: allergies, current medications, past family history, past medical history, past social history, past surgical history, and problem list.    Review of Systems   Constitutional: Negative.    HENT: Negative.     Eyes: Negative.    Respiratory: Negative.     Cardiovascular: Negative.    Gastrointestinal: Negative.    Endocrine: Negative.    Genitourinary: Negative.    Allergic/Immunologic: Negative.    Neurological: Negative.    Hematological: Negative.    Psychiatric/Behavioral: Negative.         Objective   Vital Signs:  /80   Pulse 90   Temp 97.5 °F (36.4 °C)   Resp 20   Ht 167.6 cm (65.98\")   Wt 103 kg (226 lb 9.6 oz)   SpO2 98%   BMI 36.59 kg/m²               Physical Exam  Constitutional:       General: She is not in acute distress.     Appearance: Normal appearance. She is not ill-appearing.   HENT:      Head: Normocephalic and atraumatic.      Right Ear: Tympanic membrane and ear canal normal.      Left Ear: Tympanic membrane and ear canal normal.      Nose: Nose normal.      Mouth/Throat:      Mouth: Mucous membranes are moist.      Pharynx: Oropharynx is clear. No posterior oropharyngeal erythema.   Eyes:      Extraocular Movements: Extraocular movements intact.      Conjunctiva/sclera: Conjunctivae normal.      Pupils: Pupils are equal, round, and reactive to light.   Cardiovascular:      Rate and Rhythm: Normal rate and regular rhythm.      Pulses: Normal pulses.      Heart " sounds: Normal heart sounds.   Pulmonary:      Effort: Pulmonary effort is normal. No respiratory distress.      Breath sounds: Normal breath sounds.   Abdominal:      General: Abdomen is flat. Bowel sounds are normal.      Palpations: Abdomen is soft.      Tenderness: There is no abdominal tenderness.   Musculoskeletal:         General: Normal range of motion.      Cervical back: Normal range of motion and neck supple.        Feet:    Lymphadenopathy:      Cervical: No cervical adenopathy.   Skin:     General: Skin is warm and dry.      Capillary Refill: Capillary refill takes less than 2 seconds.   Neurological:      General: No focal deficit present.      Mental Status: She is alert and oriented to person, place, and time. Mental status is at baseline.      Cranial Nerves: No cranial nerve deficit.      Sensory: No sensory deficit.      Motor: No weakness.   Psychiatric:         Mood and Affect: Mood normal.         Behavior: Behavior normal.         Thought Content: Thought content normal.         Judgment: Judgment normal.          Result Review   The following data was reviewed by: Jeronimo Carias MD on 01/26/2024:  Common labs          6/1/2023    10:30   Common Labs   Glucose 99    BUN 17    Creatinine 1.06    Sodium 140    Potassium 4.3    Chloride 101    Calcium 10.5    Total Protein 7.3    Albumin 4.6    Total Bilirubin 0.5    Alkaline Phosphatase 103    AST (SGOT) 40    ALT (SGPT) 49    Total Cholesterol 249    Triglycerides 167    HDL Cholesterol 56    LDL Cholesterol  163                    Assessment and Plan  Diagnoses and all orders for this visit:    1. Annual physical exam (Primary)    2. Primary hypertension  -     Basic Metabolic Panel  -     CBC & Differential    3. NAFLD (nonalcoholic fatty liver disease)  -     Hepatic Function Panel  -     CBC & Differential    4. Hypothyroidism due to Hashimoto's thyroiditis  -     TSH Rfx On Abnormal To Free T4    5. Hypercholesterolemia  -     Lipid  Panel    6. Chronic heel pain, left  -     XR Foot 3+ View Left    Plan  1.  Regarding patient's overall physical health is unchanged and her chronic conditions are relatively stable with the exception of her cholesterol  2.  Biggest obstacle to improving her health currently is activity limitations caused by her left heel pain.  Pain is not classic description nor is a exam consistent with plantar fasciitis.  X-ray of the left foot will be obtained and anticipate physical therapy referral.  3.  Surveillance labs have been drawn.  Fib-4 will be calculated.  Cholesterol will be repeated this patient has made dietary changes.  If cholesterol remains elevated patient is agreeable to a statin.  4.  Patient is up-to-date on cancer screenings  5.  Patient declines vaccines        Follow Up  Return in about 6 months (around 7/26/2024).  Patient was given instructions and counseling regarding her condition or for health maintenance advice. Please see specific information pulled into the AVS if appropriate.

## 2024-01-27 LAB
ALBUMIN SERPL-MCNC: 4.6 G/DL (ref 3.8–4.9)
ALP SERPL-CCNC: 101 IU/L (ref 44–121)
ALT SERPL-CCNC: 53 IU/L (ref 0–32)
AST SERPL-CCNC: 44 IU/L (ref 0–40)
BASOPHILS # BLD AUTO: 0 X10E3/UL (ref 0–0.2)
BASOPHILS NFR BLD AUTO: 1 %
BILIRUB DIRECT SERPL-MCNC: 0.16 MG/DL (ref 0–0.4)
BILIRUB SERPL-MCNC: 0.5 MG/DL (ref 0–1.2)
BUN SERPL-MCNC: 12 MG/DL (ref 8–27)
BUN/CREAT SERPL: 12 (ref 12–28)
CALCIUM SERPL-MCNC: 9.9 MG/DL (ref 8.7–10.3)
CHLORIDE SERPL-SCNC: 101 MMOL/L (ref 96–106)
CHOLEST SERPL-MCNC: 260 MG/DL (ref 100–199)
CO2 SERPL-SCNC: 22 MMOL/L (ref 20–29)
CREAT SERPL-MCNC: 0.99 MG/DL (ref 0.57–1)
EGFRCR SERPLBLD CKD-EPI 2021: 65 ML/MIN/1.73
EOSINOPHIL # BLD AUTO: 0.2 X10E3/UL (ref 0–0.4)
EOSINOPHIL NFR BLD AUTO: 3 %
ERYTHROCYTE [DISTWIDTH] IN BLOOD BY AUTOMATED COUNT: 13.5 % (ref 11.7–15.4)
GLUCOSE SERPL-MCNC: 101 MG/DL (ref 70–99)
HCT VFR BLD AUTO: 49.2 % (ref 34–46.6)
HDLC SERPL-MCNC: 57 MG/DL
HGB BLD-MCNC: 16.3 G/DL (ref 11.1–15.9)
IMM GRANULOCYTES # BLD AUTO: 0 X10E3/UL (ref 0–0.1)
IMM GRANULOCYTES NFR BLD AUTO: 0 %
LDLC SERPL CALC-MCNC: 182 MG/DL (ref 0–99)
LYMPHOCYTES # BLD AUTO: 1.7 X10E3/UL (ref 0.7–3.1)
LYMPHOCYTES NFR BLD AUTO: 35 %
MCH RBC QN AUTO: 29 PG (ref 26.6–33)
MCHC RBC AUTO-ENTMCNC: 33.1 G/DL (ref 31.5–35.7)
MCV RBC AUTO: 87 FL (ref 79–97)
MONOCYTES # BLD AUTO: 0.4 X10E3/UL (ref 0.1–0.9)
MONOCYTES NFR BLD AUTO: 7 %
NEUTROPHILS # BLD AUTO: 2.7 X10E3/UL (ref 1.4–7)
NEUTROPHILS NFR BLD AUTO: 54 %
PLATELET # BLD AUTO: 226 X10E3/UL (ref 150–450)
POTASSIUM SERPL-SCNC: 4.5 MMOL/L (ref 3.5–5.2)
PROT SERPL-MCNC: 7.2 G/DL (ref 6–8.5)
RBC # BLD AUTO: 5.63 X10E6/UL (ref 3.77–5.28)
SODIUM SERPL-SCNC: 139 MMOL/L (ref 134–144)
TRIGL SERPL-MCNC: 120 MG/DL (ref 0–149)
TSH SERPL DL<=0.005 MIU/L-ACNC: 0.63 UIU/ML (ref 0.45–4.5)
VLDLC SERPL CALC-MCNC: 21 MG/DL (ref 5–40)
WBC # BLD AUTO: 5.1 X10E3/UL (ref 3.4–10.8)

## 2024-01-29 DIAGNOSIS — M79.672 CHRONIC HEEL PAIN, LEFT: ICD-10-CM

## 2024-01-29 DIAGNOSIS — E03.8 OTHER SPECIFIED HYPOTHYROIDISM: ICD-10-CM

## 2024-01-29 DIAGNOSIS — G89.29 CHRONIC HEEL PAIN, LEFT: ICD-10-CM

## 2024-01-29 DIAGNOSIS — K76.0 NAFLD (NONALCOHOLIC FATTY LIVER DISEASE): Primary | ICD-10-CM

## 2024-01-29 DIAGNOSIS — E78.00 HYPERCHOLESTEROLEMIA: ICD-10-CM

## 2024-01-29 RX ORDER — LEVOTHYROXINE SODIUM 88 UG/1
88 TABLET ORAL DAILY
Qty: 90 TABLET | Refills: 1 | Status: SHIPPED | OUTPATIENT
Start: 2024-01-29

## 2024-01-29 RX ORDER — ROSUVASTATIN CALCIUM 5 MG/1
5 TABLET, COATED ORAL DAILY
Qty: 90 TABLET | Refills: 1 | Status: SHIPPED | OUTPATIENT
Start: 2024-01-29

## 2024-02-01 ENCOUNTER — OFFICE VISIT (OUTPATIENT)
Age: 59
End: 2024-02-01
Payer: COMMERCIAL

## 2024-02-01 VITALS
WEIGHT: 225 LBS | SYSTOLIC BLOOD PRESSURE: 118 MMHG | BODY MASS INDEX: 36.16 KG/M2 | HEIGHT: 66 IN | DIASTOLIC BLOOD PRESSURE: 88 MMHG

## 2024-02-01 DIAGNOSIS — M72.2 PLANTAR FASCIITIS, LEFT: Primary | ICD-10-CM

## 2024-02-01 RX ORDER — MONTELUKAST SODIUM 10 MG/1
TABLET ORAL
COMMUNITY
End: 2024-02-01

## 2024-02-01 NOTE — PROGRESS NOTES
[facility] Note        Subjective     Pain of the Left Foot      HPI    Razia Sequeira is a 58 y.o. female. This is a very pleasant patient here to discuss her left heel pain.  She reports pain since 6/23 without trauma or injury. Pain is worse first thing in the morning and getting up from a seated position.  She reports some occasional radiating pain up the calf.  Pain is sharp and 8/10.  She works part time as a  for [employer] and has increased pain with standing on the concrete floors.  She has treated with icing, stretching, soaking in hot water with short term improved pain.  She has used her sister's night splint, but was unable to tolerate it.  Here for further evaluation and treatment recommendations.     Past Medical History:   Diagnosis Date    Allergy     Anxiety     Hypertension     Ovarian cyst       Past Surgical History:   Procedure Laterality Date    BREAST SURGERY      breast reduction    CARDIAC CATHETERIZATION N/A 11/18/2022    Procedure: Left Heart Cath;  Surgeon: Luís Tran MD;  Location:  [facility] CATH INVASIVE LOCATION;  Service: Cardiovascular;  Laterality: N/A;    CARDIAC CATHETERIZATION N/A 11/19/2022    Procedure: PERICARDIOCENTESIS;  Surgeon: Luís Tran MD;  Location:  [facility] CATH INVASIVE LOCATION;  Service: Cardiology;  Laterality: N/A;    HYSTERECTOMY      age 39    OOPHORECTOMY      REDUCTION MAMMAPLASTY Bilateral 1998    TONSILLECTOMY        Family History   Problem Relation Age of Onset    Cancer Mother     Breast cancer Paternal Aunt         pt states 60's    Ovarian cancer Maternal Grandmother         pt states 70's    Arthritis Other     Depression Other     Diabetes Other     Stroke Other     Thyroid disease Other     Hyperlipidemia Other      Social History     Socioeconomic History    Marital status:    Tobacco Use    Smoking status: Former     Packs/day: 0.25     Years: 2.00     Additional pack years: 0.00      Total pack years: 0.50     Types: Cigarettes     Quit date:      Years since quittin.1    Smokeless tobacco: Never    Tobacco comments:     Quit smoking in her 20s   Vaping Use    Vaping Use: Never used   Substance and Sexual Activity    Alcohol use: Yes     Comment: rarely-a couple per year    Drug use: Never    Sexual activity: Defer      Current Outpatient Medications on File Prior to Visit   Medication Sig Dispense Refill    albuterol sulfate  (90 Base) MCG/ACT inhaler INHALE 2 PUFFS BY MOUTH EVERY 4 HOURS AS NEEDED FOR WHEEZING 18 g 0    EQ Loratadine 10 MG tablet Take 1 tablet by mouth once daily 90 tablet 1    FLUoxetine (PROzac) 20 MG capsule Take 1 capsule by mouth Daily. 90 capsule 3    fluticasone (Flonase) 50 MCG/ACT nasal spray 1-2 sprays in each nostril daily 3 g 3    levothyroxine (SYNTHROID, LEVOTHROID) 88 MCG tablet Take 1 tablet by mouth Daily. 90 tablet 1    lisinopril-hydrochlorothiazide (PRINZIDE,ZESTORETIC) 20-12.5 MG per tablet Take 1 tablet by mouth Daily. 90 tablet 3    Magnesium Chloride (MAGNESIUM DR PO) Take 1 tablet by mouth Daily.      Omega-3 Fatty Acids (fish oil) 1000 MG capsule capsule Take 1 capsule by mouth Daily With Breakfast.      rosuvastatin (Crestor) 5 MG tablet Take 1 tablet by mouth Daily. 90 tablet 1    [DISCONTINUED] montelukast (SINGULAIR) 10 MG tablet Take 1 tablet every day by oral route.       No current facility-administered medications on file prior to visit.      No Known Allergies       Review of Systems   Constitutional: Negative.    HENT: Negative.     Eyes: Negative.    Respiratory: Negative.     Cardiovascular: Negative.    Gastrointestinal: Negative.    Endocrine: Negative.    Genitourinary: Negative.    Musculoskeletal:  Positive for arthralgias, gait problem and joint swelling.   Skin: Negative.    Allergic/Immunologic: Negative.    Hematological: Negative.    Psychiatric/Behavioral: Negative.     All other systems reviewed and are  "negative.       I reviewed the patient's chief complaint, history of present illness, review of systems, past medical history, surgical history, family history, social history, medications and allergy list.        Objective      Physical Exam  /88   Ht 167 cm (65.75\")   Wt 102 kg (225 lb)   BMI 36.59 kg/m²     Body mass index is 36.59 kg/m².    General  Mental Status - alert  General Appearance - cooperative, well groomed, not in acute distress  Orientation - Oriented X3  Build & Nutrition - well developed and well nourished  Posture - normal posture  Gait - mildly antalgic first few steps         Ortho Exam  V:  Dorsalis Pedis:   Left:2+    Posterior Tibial:Left:2+    Capillary Refill:  Brisk  MSK:  Tibia:   Left:  non tender      Ankle:   Left:  non tender, ROM  normal, and motor function  normal      Foot:   Left:  tender over the origin of the plantar fascia, ROM  normal, and motor function  normal      NEURO: [place] 5.07 monofilament test: not evaluated    Lower extremity sensation: intact     Calf Atrophy:none    Motor Function: all 5/5          Imaging/Studies  Imaging Results (Last 24 Hours)       ** No results found for the last 24 hours. **              Assessment    Assessment:  1. Plantar fasciitis, left          Plan:  Recommend over-the-counter medication as needed for discomfort  2.  Left plantar fasciitis:. I reviewed outside x-rays from 1/26/24 with the patient.  X-rays show well preserved joint spaces in the left foot with no acute bony findings.   I explained plantar fasciitis in detail to the patient.  I explained to the bow-string mechanism of the plantar fascia.  I went over the current research of the [employer] with them.  I explained the treatments that were not statistically significant in improving the problem including: injection of steroids, special orthotics, special shoes, surgery, medication, ice, time off work, etc.    I explained " "the treatments that are statistically significant at improving the problem.  These include stretching/night splinting, casting.    I explained the most important treatment: Stretching and night splinting.  I went over the patient information sheet with them, I showed them the stretches and they were able to reproduce them.  I emphasized the \"toe pull\" as the most important stretch.  They need to do the stretches 5 repetitions each, 6-8 times per day.  I explained how to do them at work, at home, before getting out of bed, before getting out of the car, and before getting up from a chair.    We provided them with a night splint.    If they do not improve after 4 months of aggressive stretching and night splinting, the next step will be a short leg fiberglass walking cast worn for 6 weeks.    Preprinted education was provided to the patient.  Patient will begin stretching and night splinting and return for casting if needed.      Shea Steen PA-C  02/01/24  10:34 EST  "

## 2024-02-20 ENCOUNTER — PATIENT MESSAGE (OUTPATIENT)
Dept: FAMILY MEDICINE CLINIC | Facility: CLINIC | Age: 59
End: 2024-02-20
Payer: COMMERCIAL

## 2024-02-20 RX ORDER — METHYLPREDNISOLONE 4 MG/1
TABLET ORAL
Qty: 21 TABLET | Refills: 0 | Status: SHIPPED | OUTPATIENT
Start: 2024-02-20

## 2024-03-04 ENCOUNTER — OFFICE VISIT (OUTPATIENT)
Age: 59
End: 2024-03-04
Payer: COMMERCIAL

## 2024-03-04 VITALS
BODY MASS INDEX: 36.03 KG/M2 | SYSTOLIC BLOOD PRESSURE: 116 MMHG | DIASTOLIC BLOOD PRESSURE: 88 MMHG | WEIGHT: 224.2 LBS | HEIGHT: 66 IN

## 2024-03-04 DIAGNOSIS — M72.2 PLANTAR FASCIITIS, LEFT: Primary | ICD-10-CM

## 2024-03-04 PROCEDURE — 99213 OFFICE O/P EST LOW 20 MIN: CPT | Performed by: PHYSICIAN ASSISTANT

## 2024-03-04 NOTE — PROGRESS NOTES
"        [facility] [place] [facility] Note        Subjective     CC: Pain of the Left Foot      HPI    Razia Sequeira is a 58 y.o. female.  Patient returns today for left plantar fasciitis.  She reports she had had some improved pain when she was on a Medrol Dosepak.  She has been stretching and night splinting for approximately the last 5 weeks.  Pain is the same in the heel.  No new symptoms        ROS:    Constiutional:Pt denies fever, chills, nausea, or vomiting.  MSK:as above     Objective      Past Medical History  Past Medical History:   Diagnosis Date    Allergy     Anxiety     Hypertension     Ovarian cyst          Physical Exam  /88   Ht 167 cm (65.75\")   Wt 102 kg (224 lb 3.2 oz)   BMI 36.46 kg/m²     Body mass index is 36.46 kg/m².    Patient is well nourished and well developed.        Ortho Exam  Left foot exam: tender at the origin of the plantar fascia and throughout the heel.  Normal motion and strength.  NVI distally. Pulses 2+    Imaging/Labs/EMG Reviewed:  Imaging Results (Last 24 Hours)       ** No results found for the last 24 hours. **              Assessment    Assessment:  1. Plantar fasciitis, left        Plan:  Recommend over the counter anti-inflammatories for pain and/or swelling  Left plantar fasciitis. I reminded the patient that it can take up to 4 months for pain to resolve with stretching and night splinting.  We discussed other treatment options including weightbearing cast for 6 weeks.  The patient is just fed up with this heel pain she has been dealing with it for 8+ months.  I did discuss a boot treating as a cast coming out only to bathe.  Patient reports that she would like to try this and will be compliant.  I explained that that is why we generally do a cast due to compliance problems.  We will get her a boot today I will see her back in 6 weeks, sooner if needed.      Shea Steen PA-C  03/05/24  13:51 EST      "
[id number]

## 2024-03-27 ENCOUNTER — OFFICE VISIT (OUTPATIENT)
Dept: GASTROENTEROLOGY | Facility: CLINIC | Age: 59
End: 2024-03-27
Payer: COMMERCIAL

## 2024-03-27 ENCOUNTER — LAB (OUTPATIENT)
Dept: LAB | Facility: HOSPITAL | Age: 59
End: 2024-03-27
Payer: COMMERCIAL

## 2024-03-27 VITALS
OXYGEN SATURATION: 100 % | SYSTOLIC BLOOD PRESSURE: 128 MMHG | TEMPERATURE: 96.9 F | WEIGHT: 226 LBS | HEART RATE: 96 BPM | BODY MASS INDEX: 36.32 KG/M2 | DIASTOLIC BLOOD PRESSURE: 86 MMHG | HEIGHT: 66 IN

## 2024-03-27 DIAGNOSIS — K76.0 FATTY LIVER: Primary | ICD-10-CM

## 2024-03-27 LAB
INR PPP: 0.98 (ref 0.89–1.12)
PROTHROMBIN TIME: 13.1 SECONDS (ref 12.2–14.5)

## 2024-03-27 PROCEDURE — 82103 ALPHA-1-ANTITRYPSIN TOTAL: CPT | Performed by: INTERNAL MEDICINE

## 2024-03-27 PROCEDURE — 36415 COLL VENOUS BLD VENIPUNCTURE: CPT | Performed by: INTERNAL MEDICINE

## 2024-03-27 PROCEDURE — 99204 OFFICE O/P NEW MOD 45 MIN: CPT | Performed by: INTERNAL MEDICINE

## 2024-03-27 PROCEDURE — 86015 ACTIN ANTIBODY EACH: CPT | Performed by: INTERNAL MEDICINE

## 2024-03-27 PROCEDURE — 86258 DGP ANTIBODY EACH IG CLASS: CPT | Performed by: INTERNAL MEDICINE

## 2024-03-27 PROCEDURE — 82728 ASSAY OF FERRITIN: CPT | Performed by: INTERNAL MEDICINE

## 2024-03-27 PROCEDURE — 83540 ASSAY OF IRON: CPT | Performed by: INTERNAL MEDICINE

## 2024-03-27 PROCEDURE — 82784 ASSAY IGA/IGD/IGG/IGM EACH: CPT | Performed by: INTERNAL MEDICINE

## 2024-03-27 PROCEDURE — 86381 MITOCHONDRIAL ANTIBODY EACH: CPT | Performed by: INTERNAL MEDICINE

## 2024-03-27 PROCEDURE — 85610 PROTHROMBIN TIME: CPT | Performed by: INTERNAL MEDICINE

## 2024-03-27 PROCEDURE — 86231 EMA EACH IG CLASS: CPT | Performed by: INTERNAL MEDICINE

## 2024-03-27 PROCEDURE — 86364 TISS TRNSGLTMNASE EA IG CLAS: CPT | Performed by: INTERNAL MEDICINE

## 2024-03-27 PROCEDURE — 80053 COMPREHEN METABOLIC PANEL: CPT | Performed by: INTERNAL MEDICINE

## 2024-03-27 NOTE — PROGRESS NOTES
New Patient Consultation     Patient Name: Razia Sequeira  : 1965   MRN: [id number]     No chief complaint on file.      History of Present Illness: Razia Sequeira is a 58 y.o. female, PMH includes htn dyslipidemia, thyroid disease, asthma used to work in hospice, and admitted cardiac tamponade  from viral illness who is here today for a Gastroenterology Consultation for fatty liver.  Patient was told fatty liver  and told to lose weight.  Patient reports recently that liver tests spike.  Patient plantar fascitis.  Patient had been on prednisone short taper.  Patient had used it last month.      Only reflux with eating dinner late  Allergist noted with scope was asked about reflux    Patient reports weight has increased.      Patient worked in health care.  Patient vaccinated against hepatitis B    Patient has three granddaughters (age 14, 9, 10)      Patient denies personal or FHx of PUD, H Pylori, gastritis, pancreatitis, colitis, Celiac disease, UC, Crohn's disease, IBS, colon or gastric cancers. Pt denies EtOH, tobacco, illicit substance  Rare ibuprofen    Last EGD: none  Last Colon:  belinSCANNED - COLONOSCOPY (2017)       Subjective      Review of Systems   Constitutional: Negative.    HENT: Negative.     Eyes: Negative.    Respiratory: Negative.     Cardiovascular: Negative.    Gastrointestinal: Negative.    Endocrine: Negative.    Genitourinary: Negative.    Musculoskeletal: Negative.    Skin: Negative.    Allergic/Immunologic: Negative.    Neurological: Negative.    Hematological: Negative.    Psychiatric/Behavioral: Negative.         Past Medical History:   Diagnosis Date    Allergy     Anxiety     Hypertension     Ovarian cyst        Past Surgical History:   Procedure Laterality Date    BREAST SURGERY      breast reduction    CARDIAC CATHETERIZATION N/A 2022    Procedure: Left Heart Cath;  Surgeon: Luís Tran MD;  Location: [facility] CATH INVASIVE  LOCATION;  Service: Cardiovascular;  Laterality: N/A;    CARDIAC CATHETERIZATION N/A 2022    Procedure: PERICARDIOCENTESIS;  Surgeon: Luís Tran MD;  Location: [facility] INVASIVE LOCATION;  Service: Cardiology;  Laterality: N/A;    HYSTERECTOMY      age 39    OOPHORECTOMY      REDUCTION MAMMAPLASTY Bilateral 1998    TONSILLECTOMY         Family History   Problem Relation Age of Onset    Cancer Mother     Breast cancer Paternal Aunt         pt states 60's    Ovarian cancer Maternal Grandmother         pt states 70's    Arthritis Other     Depression Other     Diabetes Other     Stroke Other     Thyroid disease Other     Hyperlipidemia Other        Social History     Socioeconomic History    Marital status:    Tobacco Use    Smoking status: Former     Current packs/day: 0.00     Average packs/day: 0.3 packs/day for 2.0 years (0.5 ttl pk-yrs)     Types: Cigarettes     Start date:      Quit date:      Years since quittin.2    Smokeless tobacco: Never    Tobacco comments:     Quit smoking in her 20s   Vaping Use    Vaping status: Never Used   Substance and Sexual Activity    Alcohol use: Not Currently     Comment: Occasionally    Drug use: Never    Sexual activity: Defer       Social History     Substance and Sexual Activity   Alcohol Use Not Currently    Comment: Occasionally     Social History     Tobacco Use   Smoking Status Former    Current packs/day: 0.00    Average packs/day: 0.3 packs/day for 2.0 years (0.5 ttl pk-yrs)    Types: Cigarettes    Start date:     Quit date:     Years since quittin.2   Smokeless Tobacco Never   Tobacco Comments    Quit smoking in her 20s         Current Outpatient Medications:     albuterol sulfate  (90 Base) MCG/ACT inhaler, INHALE 2 PUFFS BY MOUTH EVERY 4 HOURS AS NEEDED FOR WHEEZING, Disp: 18 g, Rfl: 0    EQ Loratadine 10 MG tablet, Take 1 tablet by mouth once daily, Disp: 90 tablet, Rfl: 1    FLUoxetine (PROzac) 20 MG capsule,  Take 1 capsule by mouth Daily., Disp: 90 capsule, Rfl: 3    fluticasone (Flonase) 50 MCG/ACT nasal spray, 1-2 sprays in each nostril daily, Disp: 3 g, Rfl: 3    levothyroxine (SYNTHROID, LEVOTHROID) 88 MCG tablet, Take 1 tablet by mouth Daily., Disp: 90 tablet, Rfl: 1    lisinopril-hydrochlorothiazide (PRINZIDE,ZESTORETIC) 20-12.5 MG per tablet, Take 1 tablet by mouth Daily., Disp: 90 tablet, Rfl: 3    Magnesium Chloride (MAGNESIUM DR PO), Take 1 tablet by mouth Daily., Disp: , Rfl:     Omega-3 Fatty Acids (fish oil) 1000 MG capsule capsule, Take 1 capsule by mouth Daily With Breakfast., Disp: , Rfl:     rosuvastatin (Crestor) 5 MG tablet, Take 1 tablet by mouth Daily., Disp: 90 tablet, Rfl: 1    No Known Allergies    Objective     Physical Exam:  There were no vitals filed for this visit.  There is no height or weight on file to calculate BMI.     Physical Exam  Constitutional:       Appearance: Normal appearance.   Abdominal:      General: Abdomen is flat. Bowel sounds are normal.      Palpations: Abdomen is soft.   Skin:     General: Skin is warm and dry.   Neurological:      General: No focal deficit present.      Mental Status: She is alert.         Assessment / Plan      1. Fatty liver  Liver tests not improving and worsening but synthetic function appears stable.  She has inflammation from plantar fascitis and likely fatty liver worsened by steroid use and weight loss  - Ferritin  - Protime-INR  - Iron  - Mitochondrial Antibodies, M2  - Alpha - 1 - Antitrypsin  - Celiac Comprehensive Panel  - Anti-Smooth Muscle Antibody Titer  - Liver Elastography  - Comprehensive Metabolic Panel      Health Maintenance  Colonoscopy last done by Dr Gilmore in 2017 she is likely due and will need this scheduled    Follow Up:   Return in about 4 months (around 7/27/2024).    Plan of care reviewed with the patient at the conclusion of today's visit.  Education was provided regarding diagnosis, management, and any prescribed or  recommended OTC medications.  Patient verbalized understanding of and agreement with management plan.     NOTE TO PATIENT: The 21st Century Cures Act makes medical notes like these available to patients in the interest of transparency. However, be advised this is a medical document. It is intended as peer to peer communication. It is written in medical language and may contain abbreviations or verbiage that are unfamiliar. It may appear blunt or direct. Medical documents are intended to carry relevant information, facts as evident, and the clinical opinion of the practitioner.     Jessy Jorgensen MD   [facility]    Part of this note may be an electronic transcription/translation of spoken language to printed text using the Dragon Dictation System.

## 2024-03-28 LAB
ALBUMIN SERPL-MCNC: 4.5 G/DL (ref 3.5–5.2)
ALBUMIN/GLOB SERPL: 1.6 G/DL
ALP SERPL-CCNC: 97 U/L (ref 39–117)
ALPHA1 GLOB MFR UR ELPH: 144 MG/DL (ref 90–200)
ALT SERPL W P-5'-P-CCNC: 41 U/L (ref 1–33)
ANION GAP SERPL CALCULATED.3IONS-SCNC: 12.6 MMOL/L (ref 5–15)
AST SERPL-CCNC: 37 U/L (ref 1–32)
BILIRUB SERPL-MCNC: 0.5 MG/DL (ref 0–1.2)
BUN SERPL-MCNC: 13 MG/DL (ref 8–23)
BUN/CREAT SERPL: 12.3 (ref 7–25)
CALCIUM SPEC-SCNC: 9.8 MG/DL (ref 8.6–10.5)
CHLORIDE SERPL-SCNC: 102 MMOL/L (ref 98–107)
CO2 SERPL-SCNC: 26.4 MMOL/L (ref 22–29)
CREAT SERPL-MCNC: 1.06 MG/DL (ref 0.57–1)
EGFRCR SERPLBLD CKD-EPI 2021: 60.3 ML/MIN/1.73
FERRITIN SERPL-MCNC: 190 NG/ML (ref 13–150)
GLOBULIN UR ELPH-MCNC: 2.8 GM/DL
GLUCOSE SERPL-MCNC: 88 MG/DL (ref 65–99)
IRON 24H UR-MRATE: 87 MCG/DL (ref 37–145)
POTASSIUM SERPL-SCNC: 3.4 MMOL/L (ref 3.5–5.2)
PROT SERPL-MCNC: 7.3 G/DL (ref 6–8.5)
SODIUM SERPL-SCNC: 141 MMOL/L (ref 136–145)

## 2024-03-29 LAB
ENDOMYSIUM IGA SER QL: NEGATIVE
GLIADIN PEPTIDE IGA SER-ACNC: 6 UNITS (ref 0–19)
GLIADIN PEPTIDE IGG SER-ACNC: 4 UNITS (ref 0–19)
IGA SERPL-MCNC: 231 MG/DL (ref 87–352)
MITOCHONDRIA M2 IGG SER-ACNC: <20 UNITS (ref 0–20)
SMA IGG SER-ACNC: 15 UNITS (ref 0–19)
TTG IGA SER-ACNC: <2 U/ML (ref 0–3)
TTG IGG SER-ACNC: <2 U/ML (ref 0–5)

## 2024-03-31 DIAGNOSIS — R79.89 ABNORMAL LIVER FUNCTION TESTS: Primary | ICD-10-CM

## 2024-04-01 ENCOUNTER — TELEPHONE (OUTPATIENT)
Dept: GASTROENTEROLOGY | Facility: CLINIC | Age: 59
End: 2024-04-01
Payer: COMMERCIAL

## 2024-04-01 NOTE — TELEPHONE ENCOUNTER
----- Message from Jessy Jorgensen MD sent at 3/31/2024  9:51 PM EDT -----  Please let patient know her ferritin studies were elevated meaning she holds onto iron.  This may also reflect inflammation.  Please tell patient that I have ordered  hemochromatosis study on her.  Thank you

## 2024-04-01 NOTE — TELEPHONE ENCOUNTER
,  I called patient and advised. She states she doesn't take any iron supplement or multi vitamin.   She states she will have this lab done.

## 2024-04-04 ENCOUNTER — LAB (OUTPATIENT)
Dept: LAB | Facility: HOSPITAL | Age: 59
End: 2024-04-04
Payer: COMMERCIAL

## 2024-04-04 PROCEDURE — 81256 HFE GENE: CPT | Performed by: INTERNAL MEDICINE

## 2024-04-09 RX ORDER — BUSPIRONE HYDROCHLORIDE 5 MG/1
5 TABLET ORAL 3 TIMES DAILY
Qty: 90 TABLET | Refills: 0 | Status: SHIPPED | OUTPATIENT
Start: 2024-04-09

## 2024-04-15 ENCOUNTER — OFFICE VISIT (OUTPATIENT)
Age: 59
End: 2024-04-15
Payer: COMMERCIAL

## 2024-04-15 VITALS
WEIGHT: 227.07 LBS | HEIGHT: 66 IN | DIASTOLIC BLOOD PRESSURE: 80 MMHG | SYSTOLIC BLOOD PRESSURE: 122 MMHG | BODY MASS INDEX: 36.49 KG/M2

## 2024-04-15 DIAGNOSIS — M72.2 PLANTAR FASCIITIS, LEFT: Primary | ICD-10-CM

## 2024-04-15 NOTE — PROGRESS NOTES
"        [facility] Note        Subjective     CC: Follow-up (6 week follow up; Plantar fasciitis, left )      HPI    Razia Sequeira is a 58 y.o. female. Patient returns today for her left PF.  She has been using a boot treating it like a cast.  She reports improved pain.  No new symptoms     Overall, patient's symptoms are improved    ROS:    Constiutional:Pt denies fever, chills, nausea, or vomiting.  MSK:as above        Objective      Past Medical History  Past Medical History:   Diagnosis Date    Allergy     Anxiety     Fatty liver     Hypertension     Ovarian cyst          Physical Exam  /80   Ht 167 cm (65.75\")   Wt 103 kg (227 lb 1.2 oz)   BMI 36.93 kg/m²     Body mass index is 36.93 kg/m².    Patient is well nourished and well developed.        Ortho Exam  Left foot exam: nontender to palpation.  Normal ROM and 5/5 strength.  NVI distally    Imaging/Labs/EMG Reviewed:  Imaging Results (Last 24 Hours)       ** No results found for the last 24 hours. **              Assessment    Assessment:  No diagnosis found.    Plan:  Recommend over the counter anti-inflammatories for pain and/or swelling  Left PF improved with 6 weeks of using a boot as a cast.  She reports she still has some pain after standing for several hours.  She may now wean herself out of the boot and will return to stretching and night splinting.  I reviewed the stretches with her.  RTC prn.      Shea Steen PA-C  04/15/24  10:38 EDT      "

## 2024-04-22 LAB
HFE GENE MUT ANL BLD/T: NORMAL
IMP & REVIEW OF LAB RESULTS: NORMAL

## 2024-04-25 DIAGNOSIS — K76.0 FATTY LIVER: Primary | ICD-10-CM

## 2024-05-01 DIAGNOSIS — J30.89 ENVIRONMENTAL AND SEASONAL ALLERGIES: ICD-10-CM

## 2024-05-01 RX ORDER — FLUTICASONE PROPIONATE 50 MCG
SPRAY, SUSPENSION (ML) NASAL
Qty: 3 G | Refills: 3 | Status: SHIPPED | OUTPATIENT
Start: 2024-05-01

## 2024-05-02 DIAGNOSIS — K76.0 FATTY LIVER: Primary | ICD-10-CM

## 2024-05-06 ENCOUNTER — HOSPITAL ENCOUNTER (OUTPATIENT)
Dept: GENERAL RADIOLOGY | Facility: HOSPITAL | Age: 59
Discharge: HOME | End: 2024-05-06
Admitting: PHYSICIAN ASSISTANT
Payer: COMMERCIAL

## 2024-05-06 ENCOUNTER — OFFICE VISIT (OUTPATIENT)
Dept: FAMILY MEDICINE CLINIC | Facility: CLINIC | Age: 59
End: 2024-05-06
Payer: COMMERCIAL

## 2024-05-06 VITALS
BODY MASS INDEX: 34.55 KG/M2 | HEART RATE: 90 BPM | RESPIRATION RATE: 14 BRPM | HEIGHT: 66 IN | DIASTOLIC BLOOD PRESSURE: 84 MMHG | OXYGEN SATURATION: 96 % | TEMPERATURE: 97.7 F | SYSTOLIC BLOOD PRESSURE: 120 MMHG | WEIGHT: 215 LBS

## 2024-05-06 DIAGNOSIS — F41.9 ANXIETY: ICD-10-CM

## 2024-05-06 DIAGNOSIS — S99.922A INJURY OF LEFT FOOT, INITIAL ENCOUNTER: Primary | ICD-10-CM

## 2024-05-06 DIAGNOSIS — M79.672 LEFT FOOT PAIN: ICD-10-CM

## 2024-05-06 PROCEDURE — 73630 X-RAY EXAM OF FOOT: CPT

## 2024-05-06 PROCEDURE — 99213 OFFICE O/P EST LOW 20 MIN: CPT | Performed by: PHYSICIAN ASSISTANT

## 2024-05-06 RX ORDER — BUSPIRONE HYDROCHLORIDE 5 MG/1
5 TABLET ORAL 3 TIMES DAILY
Qty: 90 TABLET | Refills: 0 | OUTPATIENT
Start: 2024-05-06

## 2024-05-06 RX ORDER — BUSPIRONE HYDROCHLORIDE 5 MG/1
5 TABLET ORAL 3 TIMES DAILY
Qty: 90 TABLET | Refills: 5 | Status: SHIPPED | OUTPATIENT
Start: 2024-05-06

## 2024-06-01 ENCOUNTER — HOSPITAL ENCOUNTER (OUTPATIENT)
Dept: ULTRASOUND IMAGING | Facility: HOSPITAL | Age: 59
Discharge: HOME | End: 2024-06-01
Admitting: INTERNAL MEDICINE
Payer: COMMERCIAL

## 2024-06-01 DIAGNOSIS — K76.0 FATTY LIVER: ICD-10-CM

## 2024-06-01 PROCEDURE — 76705 ECHO EXAM OF ABDOMEN: CPT

## 2024-06-01 PROCEDURE — 76981 USE PARENCHYMA: CPT

## 2024-07-09 ENCOUNTER — OFFICE VISIT (OUTPATIENT)
Dept: FAMILY MEDICINE CLINIC | Facility: CLINIC | Age: 59
End: 2024-07-09
Payer: COMMERCIAL

## 2024-07-09 VITALS
BODY MASS INDEX: 35.55 KG/M2 | TEMPERATURE: 97.3 F | SYSTOLIC BLOOD PRESSURE: 108 MMHG | DIASTOLIC BLOOD PRESSURE: 72 MMHG | RESPIRATION RATE: 14 BRPM | OXYGEN SATURATION: 98 % | HEART RATE: 79 BPM | WEIGHT: 221.2 LBS | HEIGHT: 66 IN

## 2024-07-09 DIAGNOSIS — M25.50 ARTHRALGIA, UNSPECIFIED JOINT: ICD-10-CM

## 2024-07-09 DIAGNOSIS — E03.9 ACQUIRED HYPOTHYROIDISM: Primary | ICD-10-CM

## 2024-07-09 PROBLEM — E66.9 OBESITY (BMI 35.0-39.9 WITHOUT COMORBIDITY): Status: ACTIVE | Noted: 2022-11-08

## 2024-07-09 PROCEDURE — 99214 OFFICE O/P EST MOD 30 MIN: CPT | Performed by: NURSE PRACTITIONER

## 2024-07-09 RX ORDER — MELOXICAM 15 MG/1
15 TABLET ORAL DAILY
Qty: 30 TABLET | Refills: 1 | Status: SHIPPED | OUTPATIENT
Start: 2024-07-09

## 2024-07-09 NOTE — PROGRESS NOTES
Date: 2024   Patient Name: Razia Sequeira  : 1965   MRN: [id number]     Chief Complaint:    Chief Complaint   Patient presents with    Pain     Shoulder/hip joints       History of Present Illness: Razia Sequeira is a 59 y.o. female who is here today for Joint pains, shoulders, and BL hips, bottom of her feet are hurting as well.   Symptoms have been present for several weeks.  In the past she has started that this happens when her thyroid is off  She has been taking tylenol and no improvement in symptoms.  She tried to avoid taking OTC medications.  Mother with joint discomfort, but no diagnosis of rheumatoid arthritis.    Pain  Associated symptoms include arthralgias.            Review of Systems:   Review of Systems   Musculoskeletal:  Positive for arthralgias.       Past Medical History:   Past Medical History:   Diagnosis Date    Allergic     Seasonal    Allergy     Anxiety     Asthma     Seasonal with allergies or dust    Fatty liver     Hypertension     Ovarian cyst     Urinary tract infection     Twice       Past Surgical History:   Past Surgical History:   Procedure Laterality Date    BREAST SURGERY      breast reduction    CARDIAC CATHETERIZATION N/A 2022    Procedure: Left Heart Cath;  Surgeon: Luís Tran MD;  Location:  [facility] CATH INVASIVE LOCATION;  Service: Cardiovascular;  Laterality: N/A;    CARDIAC CATHETERIZATION N/A 2022    Procedure: PERICARDIOCENTESIS;  Surgeon: Luís Tran MD;  Location:  [facility] CATH INVASIVE LOCATION;  Service: Cardiology;  Laterality: N/A;    COLONOSCOPY      HYSTERECTOMY      age 39    OOPHORECTOMY      REDUCTION MAMMAPLASTY Bilateral 1998    TONSILLECTOMY         Family History:   Family History   Problem Relation Age of Onset    Cancer Mother     Breast cancer Paternal Aunt         pt states 60's    Ovarian cancer Maternal Grandmother         pt states 70's    Arthritis Other     Depression Other     Diabetes Other      Stroke Other     Thyroid disease Other     Hyperlipidemia Other        Social History:   Social History     Socioeconomic History    Marital status:    Tobacco Use    Smoking status: Former     Current packs/day: 0.00     Average packs/day: 0.3 packs/day for 2.0 years (0.5 ttl pk-yrs)     Types: Cigarettes     Start date: 1983     Quit date:      Years since quittin.5    Smokeless tobacco: Never    Tobacco comments:     Quit smoking in her 20s   Vaping Use    Vaping status: Never Used   Substance and Sexual Activity    Alcohol use: Not Currently     Comment: Occasionally    Drug use: Never    Sexual activity: Defer       Medications:     Current Outpatient Medications:     albuterol sulfate  (90 Base) MCG/ACT inhaler, INHALE 2 PUFFS BY MOUTH EVERY 4 HOURS AS NEEDED FOR WHEEZING, Disp: 18 g, Rfl: 0    busPIRone (BUSPAR) 5 MG tablet, Take 1 tablet by mouth 3 (Three) Times a Day., Disp: 90 tablet, Rfl: 5    EQ Loratadine 10 MG tablet, Take 1 tablet by mouth once daily, Disp: 90 tablet, Rfl: 1    FLUoxetine (PROzac) 20 MG capsule, Take 1 capsule by mouth Daily., Disp: 90 capsule, Rfl: 3    fluticasone (Flonase) 50 MCG/ACT nasal spray, 1-2 sprays in each nostril daily, Disp: 3 g, Rfl: 3    levothyroxine (SYNTHROID, LEVOTHROID) 88 MCG tablet, Take 1 tablet by mouth Daily., Disp: 90 tablet, Rfl: 1    lisinopril-hydrochlorothiazide (PRINZIDE,ZESTORETIC) 20-12.5 MG per tablet, Take 1 tablet by mouth Daily., Disp: 90 tablet, Rfl: 3    Magnesium Chloride (MAGNESIUM DR PO), Take 1 tablet by mouth Daily., Disp: , Rfl:     meloxicam (MOBIC) 15 MG tablet, Take 1 tablet by mouth Daily., Disp: 30 tablet, Rfl: 1    rosuvastatin (Crestor) 5 MG tablet, Take 1 tablet by mouth Daily. (Patient not taking: Reported on 2024), Disp: 90 tablet, Rfl: 1    Allergies:   No Known Allergies      Physical Exam:  Vital Signs:   Vitals:    24 1123   BP: 108/72   Pulse: 79   Resp: 14   Temp: 97.3 °F (36.3 °C)  "  SpO2: 98%   Weight: 100 kg (221 lb 3.2 oz)   Height: 167 cm (65.75\")     Body mass index is 35.97 kg/m².     Physical Exam  Vitals and nursing note reviewed.   Constitutional:       Appearance: Normal appearance.   HENT:      Head: Normocephalic and atraumatic.   Cardiovascular:      Rate and Rhythm: Normal rate and regular rhythm.   Pulmonary:      Effort: Pulmonary effort is normal.      Breath sounds: Normal breath sounds.   Abdominal:      General: Bowel sounds are normal.   Musculoskeletal:      Right shoulder: Tenderness present.      Left shoulder: Tenderness present.      Right hip: Tenderness present.      Left hip: Tenderness present.   Skin:     General: Skin is warm.   Neurological:      General: No focal deficit present.      Mental Status: She is alert and oriented to person, place, and time.   Psychiatric:         Mood and Affect: Mood normal.           Assessment/Plan:   Diagnoses and all orders for this visit:    1. Acquired hypothyroidism (Primary)  -     Comprehensive Metabolic Panel  -     CBC Auto Differential  -     T4, Free  -     TSH    2. Arthralgia, unspecified joint  -     meloxicam (MOBIC) 15 MG tablet; Take 1 tablet by mouth Daily.  Dispense: 30 tablet; Refill: 1       Updating labs today, take levothyroxine first thing in the morning on and empty stomach and wait 30 minutes before eating or drinking  Take meloxicam daily to aid in arthralgia, drink plenty of water, monitor for worsening symptoms.    Follow Up:   Return for Next scheduled follow up.    Keila Auguste. TAMAR   [facility]   "

## 2024-07-10 LAB
ALBUMIN SERPL-MCNC: 4.6 G/DL (ref 3.9–4.9)
ALP SERPL-CCNC: 103 IU/L (ref 44–121)
ALT SERPL-CCNC: 27 IU/L (ref 0–32)
AST SERPL-CCNC: 27 IU/L (ref 0–40)
BASOPHILS # BLD AUTO: 0 X10E3/UL (ref 0–0.2)
BASOPHILS NFR BLD AUTO: 1 %
BILIRUB SERPL-MCNC: 0.4 MG/DL (ref 0–1.2)
BUN SERPL-MCNC: 17 MG/DL (ref 8–27)
BUN/CREAT SERPL: 19 (ref 12–28)
CALCIUM SERPL-MCNC: 9.8 MG/DL (ref 8.7–10.3)
CHLORIDE SERPL-SCNC: 102 MMOL/L (ref 96–106)
CO2 SERPL-SCNC: 23 MMOL/L (ref 20–29)
CREAT SERPL-MCNC: 0.88 MG/DL (ref 0.57–1)
EGFRCR SERPLBLD CKD-EPI 2021: 75 ML/MIN/1.73
EOSINOPHIL # BLD AUTO: 0.2 X10E3/UL (ref 0–0.4)
EOSINOPHIL NFR BLD AUTO: 3 %
ERYTHROCYTE [DISTWIDTH] IN BLOOD BY AUTOMATED COUNT: 13.2 % (ref 11.7–15.4)
GLOBULIN SER CALC-MCNC: 2.4 G/DL (ref 1.5–4.5)
GLUCOSE SERPL-MCNC: 101 MG/DL (ref 70–99)
HCT VFR BLD AUTO: 47.1 % (ref 34–46.6)
HGB BLD-MCNC: 15 G/DL (ref 11.1–15.9)
IMM GRANULOCYTES # BLD AUTO: 0 X10E3/UL (ref 0–0.1)
IMM GRANULOCYTES NFR BLD AUTO: 0 %
LYMPHOCYTES # BLD AUTO: 1.8 X10E3/UL (ref 0.7–3.1)
LYMPHOCYTES NFR BLD AUTO: 34 %
MCH RBC QN AUTO: 27.8 PG (ref 26.6–33)
MCHC RBC AUTO-ENTMCNC: 31.8 G/DL (ref 31.5–35.7)
MCV RBC AUTO: 87 FL (ref 79–97)
MONOCYTES # BLD AUTO: 0.5 X10E3/UL (ref 0.1–0.9)
MONOCYTES NFR BLD AUTO: 9 %
NEUTROPHILS # BLD AUTO: 2.9 X10E3/UL (ref 1.4–7)
NEUTROPHILS NFR BLD AUTO: 53 %
PLATELET # BLD AUTO: 229 X10E3/UL (ref 150–450)
POTASSIUM SERPL-SCNC: 4 MMOL/L (ref 3.5–5.2)
PROT SERPL-MCNC: 7 G/DL (ref 6–8.5)
RBC # BLD AUTO: 5.4 X10E6/UL (ref 3.77–5.28)
SODIUM SERPL-SCNC: 139 MMOL/L (ref 134–144)
T4 FREE SERPL-MCNC: 1.18 NG/DL (ref 0.82–1.77)
TSH SERPL DL<=0.005 MIU/L-ACNC: 0.76 UIU/ML (ref 0.45–4.5)
WBC # BLD AUTO: 5.4 X10E3/UL (ref 3.4–10.8)

## 2024-07-16 DIAGNOSIS — I10 WELL-CONTROLLED HYPERTENSION: ICD-10-CM

## 2024-07-16 RX ORDER — LISINOPRIL AND HYDROCHLOROTHIAZIDE 20; 12.5 MG/1; MG/1
1 TABLET ORAL DAILY
Qty: 90 TABLET | Refills: 0 | Status: SHIPPED | OUTPATIENT
Start: 2024-07-16

## 2024-07-23 ENCOUNTER — OFFICE VISIT (OUTPATIENT)
Dept: GASTROENTEROLOGY | Facility: CLINIC | Age: 59
End: 2024-07-23
Payer: COMMERCIAL

## 2024-07-23 VITALS
HEIGHT: 66 IN | OXYGEN SATURATION: 98 % | DIASTOLIC BLOOD PRESSURE: 74 MMHG | BODY MASS INDEX: 35.45 KG/M2 | HEART RATE: 93 BPM | TEMPERATURE: 98.4 F | WEIGHT: 220.6 LBS | SYSTOLIC BLOOD PRESSURE: 128 MMHG

## 2024-07-23 DIAGNOSIS — K76.0 METABOLIC DYSFUNCTION-ASSOCIATED STEATOTIC LIVER DISEASE (MASLD): Primary | ICD-10-CM

## 2024-07-23 PROCEDURE — 99213 OFFICE O/P EST LOW 20 MIN: CPT | Performed by: INTERNAL MEDICINE

## 2024-07-23 NOTE — PROGRESS NOTES
Follow Up      Patient Name: Razia Sequeira  : 1965   MRN: [id number]     Chief Complaint   Patient presents with    Follow-up     US OF LIVER,        History of Present Illness: Razia Sequeira is a 59 y.o. female, PMH includes HTN, hypothyroidism, abnormal liver test, who is here today for follow up on fatty liver.  Patient is still working on walking or swimming due to foot.  It is better.  Left foot  Patient reports only new medication is mobic for joint pain (shoulder, hips, carpal tunnel)  She canned 56 quarts of green beans.  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 Gilmore    Subjective      Review of Systems      Current Outpatient Medications:     albuterol sulfate  (90 Base) MCG/ACT inhaler, INHALE 2 PUFFS BY MOUTH EVERY 4 HOURS AS NEEDED FOR WHEEZING, Disp: 18 g, Rfl: 0    busPIRone (BUSPAR) 5 MG tablet, Take 1 tablet by mouth 3 (Three) Times a Day., Disp: 90 tablet, Rfl: 5    EQ Loratadine 10 MG tablet, Take 1 tablet by mouth once daily, Disp: 90 tablet, Rfl: 1    FLUoxetine (PROzac) 20 MG capsule, Take 1 capsule by mouth Daily., Disp: 90 capsule, Rfl: 3    fluticasone (Flonase) 50 MCG/ACT nasal spray, 1-2 sprays in each nostril daily, Disp: 3 g, Rfl: 3    levothyroxine (SYNTHROID, LEVOTHROID) 88 MCG tablet, Take 1 tablet by mouth Daily., Disp: 90 tablet, Rfl: 1    lisinopril-hydrochlorothiazide (PRINZIDE,ZESTORETIC) 20-12.5 MG per tablet, Take 1 tablet by mouth once daily, Disp: 90 tablet, Rfl: 0    Magnesium Chloride (MAGNESIUM DR PO), Take 1 tablet by mouth Daily., Disp: , Rfl:     meloxicam (MOBIC) 15 MG tablet, Take 1 tablet by mouth Daily., Disp: 30 tablet, Rfl: 1    rosuvastatin (Crestor) 5 MG tablet, Take 1 tablet by mouth Daily. (Patient not taking: Reported on 2024), Disp: 90 tablet, Rfl: 1    No Known Allergies    Social History     Socioeconomic History    Marital status:    Tobacco  "Use    Smoking status: Former     Current packs/day: 0.00     Average packs/day: 0.3 packs/day for 2.0 years (0.5 ttl pk-yrs)     Types: Cigarettes     Start date: 1983     Quit date:      Years since quittin.5    Smokeless tobacco: Never    Tobacco comments:     Quit smoking in her 20s   Vaping Use    Vaping status: Never Used   Substance and Sexual Activity    Alcohol use: Not Currently     Comment: Occasionally    Drug use: Never    Sexual activity: Defer        Past Surgical History:   Procedure Laterality Date    BREAST SURGERY      breast reduction    CARDIAC CATHETERIZATION N/A 2022    Procedure: Left Heart Cath;  Surgeon: Luís Tran MD;  Location:  [facility] CATH INVASIVE LOCATION;  Service: Cardiovascular;  Laterality: N/A;    CARDIAC CATHETERIZATION N/A 2022    Procedure: PERICARDIOCENTESIS;  Surgeon: Luís Tran MD;  Location:  [facility] CATH INVASIVE LOCATION;  Service: Cardiology;  Laterality: N/A;    COLONOSCOPY      HYSTERECTOMY      age 39    OOPHORECTOMY      REDUCTION MAMMAPLASTY Bilateral 1998    TONSILLECTOMY          Past Medical History:   Diagnosis Date    Allergic     Seasonal    Allergy     Anxiety     Asthma     Seasonal with allergies or dust    Fatty liver     Hypertension     Ovarian cyst     Urinary tract infection     Twice        Objective     Physical Exam:  Vitals:    24 1014   Temp: 98.4 °F (36.9 °C)   Weight: 100 kg (220 lb 9.6 oz)   Height: 167 cm (65.75\")     Body mass index is 35.88 kg/m².     Physical Exam  Constitutional:       Appearance: Normal appearance.   Neurological:      General: No focal deficit present.      Mental Status: She is alert and oriented to person, place, and time.   Psychiatric:         Mood and Affect: Mood normal.         Behavior: Behavior normal.     IMPRESSION:  Impression:  1. Hepatic steatosis.  2. Normal gallbladder.  3. No biliary dilatation.     Liver elastography:  Measurement quality: Good  Mean SWV: 1.01 m/s " (see note below)  Standard deviation: 0.08  Interquartile range/median (IQR/M): 0.13 (quality metric; <0.3 supports good precision)     Note: SWV is a measure of liver stiffness, a surrogate marker for fibrosis.  SWV <1.37 m/s indicates a low probability of clinically significant fibrosis (METAVIR Stage F2 or below)  SWV >2.2 m/s indicates a high probability of clinically significant fibrosis (METAVIR Stage F3 or above)  Intermediate values are indeterminant and correlation with clinical risk factors, laboratory markers, or liver biopsy may be needed to determine appropriate follow-up.               Assessment / Plan      Assessment/Plan:   1. Metabolic dysfunction-associated steatotic liver disease (MASLD)    Transaminases improved to normal levels  Autoimmune liver disease work up negative  Elastography and utlrasound consistent with Metavir stage F2 and below  Hepatic steatosis    Continue diet, exercise and weight loss  Hold off on Rezdiffra but would consider        Follow Up:   January 2024    Plan of care reviewed with the patient at the conclusion of today's visit.  Education was provided regarding diagnosis, management, and any prescribed or recommended OTC medications.  Patient verbalized understanding of and agreement with management plan.     NOTE TO PATIENT: The 21st Century Cures Act makes medical notes like these available to patients in the interest of transparency. However, be advised this is a medical document. It is intended as peer to peer communication. It is written in medical language and may contain abbreviations or verbiage that are unfamiliar. It may appear blunt or direct. Medical documents are intended to carry relevant information, facts as evident, and the clinical opinion of the practitioner.     Jessy Jorgensen MD   [facility]    Part of this note may be an electronic transcription/translation of spoken language to printed text using the Dragon Dictation System.

## 2024-07-25 ENCOUNTER — HOSPITAL ENCOUNTER (OUTPATIENT)
Dept: GENERAL RADIOLOGY | Facility: HOSPITAL | Age: 59
Discharge: HOME | End: 2024-07-25
Admitting: FAMILY MEDICINE
Payer: COMMERCIAL

## 2024-07-25 ENCOUNTER — OFFICE VISIT (OUTPATIENT)
Dept: FAMILY MEDICINE CLINIC | Facility: CLINIC | Age: 59
End: 2024-07-25
Payer: COMMERCIAL

## 2024-07-25 VITALS
SYSTOLIC BLOOD PRESSURE: 120 MMHG | RESPIRATION RATE: 20 BRPM | TEMPERATURE: 97.1 F | WEIGHT: 220.8 LBS | OXYGEN SATURATION: 98 % | DIASTOLIC BLOOD PRESSURE: 80 MMHG | HEIGHT: 66 IN | HEART RATE: 95 BPM | BODY MASS INDEX: 35.48 KG/M2

## 2024-07-25 DIAGNOSIS — M72.2 BILATERAL PLANTAR FASCIITIS: ICD-10-CM

## 2024-07-25 DIAGNOSIS — M25.511 CHRONIC PAIN OF BOTH SHOULDERS: ICD-10-CM

## 2024-07-25 DIAGNOSIS — M25.50 ARTHRALGIA, UNSPECIFIED JOINT: ICD-10-CM

## 2024-07-25 DIAGNOSIS — G89.29 CHRONIC PAIN OF BOTH SHOULDERS: ICD-10-CM

## 2024-07-25 DIAGNOSIS — M25.512 CHRONIC PAIN OF BOTH SHOULDERS: ICD-10-CM

## 2024-07-25 DIAGNOSIS — M25.552 BILATERAL HIP PAIN: Primary | ICD-10-CM

## 2024-07-25 DIAGNOSIS — M25.551 BILATERAL HIP PAIN: Primary | ICD-10-CM

## 2024-07-25 PROCEDURE — 73521 X-RAY EXAM HIPS BI 2 VIEWS: CPT

## 2024-07-25 PROCEDURE — 99213 OFFICE O/P EST LOW 20 MIN: CPT | Performed by: FAMILY MEDICINE

## 2024-07-25 RX ORDER — MELOXICAM 15 MG/1
15 TABLET ORAL DAILY PRN
Start: 2024-07-25

## 2024-07-25 NOTE — PROGRESS NOTES
"Chief Complaint   Patient presents with    Joint Pain     Coming and going for the past several months. Was afraid to take Meloxicam        Subjective      Razia Sequeira is a 59 y.o. who presents for various joint pains which she identifies as being in the shoulders, hips, bottom of the feet.    Bilateral shoulder pain.  No inciting events and no injury.  Pain is described as discomfort and soreness within the joint.  Sometimes use of the upper extremities increased discomfort but not always.  She denies any neck pain.    Bilateral hip pain.  Patient reports that lateral aspect of the hip over the trochanter as source of pain.  Pain is noticeable after prolonged periods of sitting and standing from a seated position or when in a squatted position.  Once again there was no injury or inciting event.    Bilateral heel pain.  Today symptoms are not present.  They have a tendency to come and go.  She is doing home stretching program for her heel pain.    Patient was given an anti-inflammatory earlier in the month to begin but she has questions about how she should take the medicine and side effects    The following portions of the patient's history were reviewed and updated as appropriate: allergies, current medications, past family history, past medical history, past social history, past surgical history, and problem list.    Review of Systems    Objective   Vital Signs:  /80   Pulse 95   Temp 97.1 °F (36.2 °C)   Resp 20   Ht 167 cm (65.75\")   Wt 100 kg (220 lb 12.8 oz)   SpO2 98%   BMI 35.91 kg/m²               Physical Exam  Vitals reviewed.   Constitutional:       Appearance: Normal appearance.   Musculoskeletal:      Right shoulder: Normal.      Left shoulder: Normal.      Right upper arm: Normal.      Left upper arm: Normal.      Right hip: Bony tenderness present. Decreased range of motion.      Left hip: Bony tenderness present.      Right foot: Decreased range of motion. Tenderness present. "      Left foot: Normal range of motion. Tenderness present.      Comments: Hip exam significant for tenderness of the bilateral greater trochanters and decreased internal range of motion of the right hip.  Bilateral RUBEN testing reproduces anterior groin pain.  There is no ASIS pain.    Foot examination is significant for decreased dorsiflexion of the right ankle joint with bilateral medial heel tenderness.   Neurological:      Mental Status: She is alert.          Result Review                     Assessment and Plan  Diagnoses and all orders for this visit:    1. Bilateral hip pain (Primary)  -     XR Hips Bilateral With or Without Pelvis 2 View    2. Arthralgia, unspecified joint  -     meloxicam (MOBIC) 15 MG tablet; Take 1 tablet by mouth Daily As Needed for Moderate Pain.    3. Chronic pain of both shoulders    4. Bilateral plantar fasciitis    Plan  1.  For patient's hip pain I have recommended we get x-rays of the hips due to the report of inguinal pain with movement to assess for any degree of arthritis.  2.  Regarding her trochanteric bursitis she may use the anti-inflammatory and there may also be a role for physical therapy and/or injections.  3.  Regarding her bilateral shoulder pain patient does not have any identifiable cause and condition can remain monitored.  PMR is not suspected  4.  Bilateral plantar fasciitis seems to be responding to her home stretching program and she should continue this along with as needed use of the anti-inflammatory  5.  Side effects of anti-inflammatories were discussed and usage of the meloxicam will be reassessed at her 6-month follow-up        Follow Up  Return in about 6 months (around 1/25/2025) for Annual.  Patient was given instructions and counseling regarding her condition or for health maintenance advice. Please see specific information pulled into the AVS if appropriate.

## 2024-07-29 ENCOUNTER — PATIENT MESSAGE (OUTPATIENT)
Dept: FAMILY MEDICINE CLINIC | Facility: CLINIC | Age: 59
End: 2024-07-29
Payer: COMMERCIAL

## 2024-07-29 DIAGNOSIS — M70.62 GREATER TROCHANTERIC BURSITIS OF BOTH HIPS: ICD-10-CM

## 2024-07-29 DIAGNOSIS — M25.552 BILATERAL HIP PAIN: Primary | ICD-10-CM

## 2024-07-29 DIAGNOSIS — M16.0 BILATERAL HIP JOINT ARTHRITIS: ICD-10-CM

## 2024-07-29 DIAGNOSIS — M70.61 GREATER TROCHANTERIC BURSITIS OF BOTH HIPS: ICD-10-CM

## 2024-07-29 DIAGNOSIS — M25.551 BILATERAL HIP PAIN: Primary | ICD-10-CM

## 2024-07-29 NOTE — TELEPHONE ENCOUNTER
From: Razia Sequeira  To: Jeronimo Carias  Sent: 7/29/2024 2:08 PM EDT  Subject: Hip Pain    Hi Dr Carias! In regard to your message about the physical therapy to help with my hip pain, I am in favor to do PT. Thank you!  Kindest regards,   Razia

## 2024-08-04 DIAGNOSIS — F32.A DEPRESSION, UNSPECIFIED DEPRESSION TYPE: ICD-10-CM

## 2024-08-04 DIAGNOSIS — F41.9 ANXIETY: ICD-10-CM

## 2024-08-05 RX ORDER — FLUOXETINE HYDROCHLORIDE 20 MG/1
20 CAPSULE ORAL DAILY
Qty: 90 CAPSULE | Refills: 0 | Status: SHIPPED | OUTPATIENT
Start: 2024-08-05

## 2024-09-10 DIAGNOSIS — E03.8 OTHER SPECIFIED HYPOTHYROIDISM: ICD-10-CM

## 2024-09-10 RX ORDER — LEVOTHYROXINE SODIUM 88 UG/1
88 TABLET ORAL DAILY
Qty: 90 TABLET | Refills: 0 | Status: SHIPPED | OUTPATIENT
Start: 2024-09-10

## 2024-10-07 DIAGNOSIS — E78.00 HYPERCHOLESTEROLEMIA: ICD-10-CM

## 2024-10-07 RX ORDER — ROSUVASTATIN CALCIUM 5 MG/1
5 TABLET, COATED ORAL DAILY
Qty: 90 TABLET | Refills: 1 | Status: SHIPPED | OUTPATIENT
Start: 2024-10-07

## 2024-10-17 DIAGNOSIS — I10 WELL-CONTROLLED HYPERTENSION: ICD-10-CM

## 2024-10-17 RX ORDER — LISINOPRIL AND HYDROCHLOROTHIAZIDE 12.5; 2 MG/1; MG/1
1 TABLET ORAL DAILY
Qty: 90 TABLET | Refills: 0 | Status: SHIPPED | OUTPATIENT
Start: 2024-10-17

## 2024-12-10 DIAGNOSIS — E03.8 OTHER SPECIFIED HYPOTHYROIDISM: ICD-10-CM

## 2024-12-11 RX ORDER — LEVOTHYROXINE SODIUM 88 UG/1
88 TABLET ORAL DAILY
Qty: 90 TABLET | Refills: 0 | Status: SHIPPED | OUTPATIENT
Start: 2024-12-11

## 2025-01-15 DIAGNOSIS — I10 WELL-CONTROLLED HYPERTENSION: ICD-10-CM

## 2025-01-15 RX ORDER — LISINOPRIL AND HYDROCHLOROTHIAZIDE 12.5; 2 MG/1; MG/1
1 TABLET ORAL DAILY
Qty: 90 TABLET | Refills: 0 | Status: SHIPPED | OUTPATIENT
Start: 2025-01-15

## 2025-01-27 ENCOUNTER — OFFICE VISIT (OUTPATIENT)
Dept: FAMILY MEDICINE CLINIC | Facility: CLINIC | Age: 60
End: 2025-01-27
Payer: COMMERCIAL

## 2025-01-27 VITALS
SYSTOLIC BLOOD PRESSURE: 120 MMHG | RESPIRATION RATE: 20 BRPM | BODY MASS INDEX: 35.39 KG/M2 | OXYGEN SATURATION: 98 % | TEMPERATURE: 97.8 F | DIASTOLIC BLOOD PRESSURE: 80 MMHG | WEIGHT: 220.2 LBS | HEART RATE: 87 BPM | HEIGHT: 66 IN

## 2025-01-27 DIAGNOSIS — I10 PRIMARY HYPERTENSION: ICD-10-CM

## 2025-01-27 DIAGNOSIS — E78.00 HYPERCHOLESTEROLEMIA: ICD-10-CM

## 2025-01-27 DIAGNOSIS — E03.8 OTHER SPECIFIED HYPOTHYROIDISM: ICD-10-CM

## 2025-01-27 DIAGNOSIS — F32.A DEPRESSION, UNSPECIFIED DEPRESSION TYPE: ICD-10-CM

## 2025-01-27 DIAGNOSIS — Z00.00 ANNUAL PHYSICAL EXAM: Primary | ICD-10-CM

## 2025-01-27 DIAGNOSIS — J45.20 MILD INTERMITTENT ASTHMA WITHOUT COMPLICATION: ICD-10-CM

## 2025-01-27 DIAGNOSIS — F41.9 ANXIETY: ICD-10-CM

## 2025-01-27 PROCEDURE — 99396 PREV VISIT EST AGE 40-64: CPT | Performed by: FAMILY MEDICINE

## 2025-01-27 RX ORDER — BECLOMETHASONE DIPROPIONATE HFA 80 UG/1
1 AEROSOL, METERED RESPIRATORY (INHALATION)
Qty: 10.6 G | Refills: 11 | Status: SHIPPED | OUTPATIENT
Start: 2025-01-27

## 2025-01-27 RX ORDER — LISINOPRIL AND HYDROCHLOROTHIAZIDE 12.5; 2 MG/1; MG/1
1 TABLET ORAL DAILY
Qty: 90 TABLET | Refills: 3 | Status: SHIPPED | OUTPATIENT
Start: 2025-01-27

## 2025-01-27 RX ORDER — LEVOTHYROXINE SODIUM 88 UG/1
88 TABLET ORAL DAILY
Qty: 90 TABLET | Refills: 3 | Status: SHIPPED | OUTPATIENT
Start: 2025-01-27

## 2025-01-27 NOTE — PROGRESS NOTES
"Chief Complaint   Patient presents with    Annual Exam     No PAP, will sched w/ her GYN (Dr. Núñez)       Subjective      Razia Sequeira is a 59 y.o. who presents for Annual Physical.     Sleep. 6-8 hours per night. Awakens during the night and has trouble returning to sleep.     Diet. \"I eat what I want to eat\".     Physical Activity. Has joined a gym. Treadmill three times per week for about an hour.     The following portions of the patient's history were reviewed and updated as appropriate: allergies, current medications, past family history, past medical history, past social history, past surgical history, and problem list.    Review of Systems   Musculoskeletal:  Positive for back pain.   All other systems reviewed and are negative.      Objective   Vital Signs:  /80   Pulse 87   Temp 97.8 °F (36.6 °C)   Resp 20   Ht 167 cm (65.75\")   Wt 99.9 kg (220 lb 3.2 oz)   SpO2 98%   BMI 35.81 kg/m²               Physical Exam  Constitutional:       General: She is not in acute distress.     Appearance: Normal appearance. She is not ill-appearing.   HENT:      Head: Normocephalic and atraumatic.      Right Ear: Tympanic membrane and ear canal normal.      Left Ear: Tympanic membrane and ear canal normal.      Nose: Nose normal.      Mouth/Throat:      Mouth: Mucous membranes are moist.      Pharynx: Oropharynx is clear. No posterior oropharyngeal erythema.   Eyes:      Extraocular Movements: Extraocular movements intact.      Conjunctiva/sclera: Conjunctivae normal.      Pupils: Pupils are equal, round, and reactive to light.   Cardiovascular:      Rate and Rhythm: Normal rate and regular rhythm.      Pulses: Normal pulses.      Heart sounds: Normal heart sounds.   Pulmonary:      Effort: Pulmonary effort is normal. No respiratory distress.      Breath sounds: Normal breath sounds.   Abdominal:      General: Abdomen is flat. Bowel sounds are normal.      Palpations: Abdomen is soft.      " Tenderness: There is no abdominal tenderness.   Musculoskeletal:         General: Normal range of motion.      Cervical back: Normal range of motion and neck supple.   Lymphadenopathy:      Cervical: No cervical adenopathy.   Skin:     General: Skin is warm and dry.      Capillary Refill: Capillary refill takes less than 2 seconds.   Neurological:      General: No focal deficit present.      Mental Status: She is alert and oriented to person, place, and time. Mental status is at baseline.      Cranial Nerves: No cranial nerve deficit.      Sensory: No sensory deficit.      Motor: No weakness.   Psychiatric:         Mood and Affect: Mood normal.         Behavior: Behavior normal.         Thought Content: Thought content normal.         Judgment: Judgment normal.          Result Review                     Assessment and Plan  Diagnoses and all orders for this visit:    1. Annual physical exam (Primary)    2. Primary hypertension  Comments:  Well-controlled.  Refill antihypertensives.  Surveillance labs ordered  Orders:  -     Comprehensive Metabolic Panel  -     CBC & Differential  -     Lipid Panel  -     lisinopril-hydrochlorothiazide (PRINZIDE,ZESTORETIC) 20-12.5 MG per tablet; Take 1 tablet by mouth Daily.  Dispense: 90 tablet; Refill: 3    3. Other specified hypothyroidism  -     TSH  -     levothyroxine (SYNTHROID, LEVOTHROID) 88 MCG tablet; Take 1 tablet by mouth Daily.  Dispense: 90 tablet; Refill: 3    4. Anxiety  Comments:  Refill fluoxetine for 1 year  Orders:  -     FLUoxetine (PROzac) 20 MG capsule; Take 1 capsule by mouth Daily.  Dispense: 90 capsule; Refill: 3    5. Depression, unspecified depression type  -     FLUoxetine (PROzac) 20 MG capsule; Take 1 capsule by mouth Daily.  Dispense: 90 capsule; Refill: 3    6. Mild intermittent asthma without complication  Comments:  Trial of ICS for springtime use when flares occur  Orders:  -     Beclomethasone Diprop HFA (Qvar RediHaler) 80 MCG/ACT inhaler;  Inhale 1 puff 2 (Two) Times a Day.  Dispense: 10.6 g; Refill: 11    7. Hypercholesterolemia  -     CT Cardiac Calcium Score Without Dye; Future    Plan  1.  Overall health is stable.  Continue walking for cardiovascular health.  Would benefit from dietary modifications to reduce saturated fats in the diet to help lower cholesterol  2.  Available vaccines discussed and declined  3.  Regarding hypercholesterolemia she would benefit from statin use but has some misconceptions about risks.  We discussed the safety of statins and how they do not contribute to dementia as this has been disproven.  Coronary calcium testing will be ordered  4.  Chronic conditions are stable.  She reports increased anxiety and use of BuSpar during the springtime which is also when asthma flares.  She will be given a trial of ICS to use from April to June.  5.  Mammogram and Pap smear will occur through her OB/GYN        Follow Up  Return in about 6 months (around 7/27/2025) for Next scheduled follow up.  Patient was given instructions and counseling regarding her condition or for health maintenance advice. Please see specific information pulled into the AVS if appropriate.

## 2025-01-28 LAB
ALBUMIN SERPL-MCNC: 4.4 G/DL (ref 3.5–5.2)
ALBUMIN/GLOB SERPL: 1.4 G/DL
ALP SERPL-CCNC: 99 U/L (ref 39–117)
ALT SERPL-CCNC: 33 U/L (ref 1–33)
AST SERPL-CCNC: 28 U/L (ref 1–32)
BASOPHILS # BLD AUTO: 0.04 10*3/MM3 (ref 0–0.2)
BASOPHILS NFR BLD AUTO: 0.6 % (ref 0–1.5)
BILIRUB SERPL-MCNC: 0.6 MG/DL (ref 0–1.2)
BUN SERPL-MCNC: 14 MG/DL (ref 8–23)
BUN/CREAT SERPL: 12.3 (ref 7–25)
CALCIUM SERPL-MCNC: 9.8 MG/DL (ref 8.6–10.5)
CHLORIDE SERPL-SCNC: 99 MMOL/L (ref 98–107)
CHOLEST SERPL-MCNC: 293 MG/DL (ref 0–200)
CO2 SERPL-SCNC: 24.9 MMOL/L (ref 22–29)
CREAT SERPL-MCNC: 1.14 MG/DL (ref 0.57–1)
EGFRCR SERPLBLD CKD-EPI 2021: 54.9 ML/MIN/1.73
EOSINOPHIL # BLD AUTO: 0.16 10*3/MM3 (ref 0–0.4)
EOSINOPHIL NFR BLD AUTO: 2.6 % (ref 0.3–6.2)
ERYTHROCYTE [DISTWIDTH] IN BLOOD BY AUTOMATED COUNT: 13.3 % (ref 12.3–15.4)
GLOBULIN SER CALC-MCNC: 3.2 GM/DL
GLUCOSE SERPL-MCNC: 79 MG/DL (ref 65–99)
HCT VFR BLD AUTO: 49.3 % (ref 34–46.6)
HDLC SERPL-MCNC: 60 MG/DL (ref 40–60)
HGB BLD-MCNC: 16.4 G/DL (ref 12–15.9)
IMM GRANULOCYTES # BLD AUTO: 0.02 10*3/MM3 (ref 0–0.05)
IMM GRANULOCYTES NFR BLD AUTO: 0.3 % (ref 0–0.5)
LDLC SERPL CALC-MCNC: 202 MG/DL (ref 0–100)
LYMPHOCYTES # BLD AUTO: 1.79 10*3/MM3 (ref 0.7–3.1)
LYMPHOCYTES NFR BLD AUTO: 28.7 % (ref 19.6–45.3)
MCH RBC QN AUTO: 28.5 PG (ref 26.6–33)
MCHC RBC AUTO-ENTMCNC: 33.3 G/DL (ref 31.5–35.7)
MCV RBC AUTO: 85.6 FL (ref 79–97)
MONOCYTES # BLD AUTO: 0.45 10*3/MM3 (ref 0.1–0.9)
MONOCYTES NFR BLD AUTO: 7.2 % (ref 5–12)
NEUTROPHILS # BLD AUTO: 3.77 10*3/MM3 (ref 1.7–7)
NEUTROPHILS NFR BLD AUTO: 60.6 % (ref 42.7–76)
NRBC BLD AUTO-RTO: 0 /100 WBC (ref 0–0.2)
PLATELET # BLD AUTO: 242 10*3/MM3 (ref 140–450)
POTASSIUM SERPL-SCNC: 4.3 MMOL/L (ref 3.5–5.2)
PROT SERPL-MCNC: 7.6 G/DL (ref 6–8.5)
RBC # BLD AUTO: 5.76 10*6/MM3 (ref 3.77–5.28)
SODIUM SERPL-SCNC: 138 MMOL/L (ref 136–145)
TRIGL SERPL-MCNC: 165 MG/DL (ref 0–150)
TSH SERPL DL<=0.005 MIU/L-ACNC: 0.79 UIU/ML (ref 0.27–4.2)
VLDLC SERPL CALC-MCNC: 31 MG/DL (ref 5–40)
WBC # BLD AUTO: 6.23 10*3/MM3 (ref 3.4–10.8)

## 2025-05-29 DIAGNOSIS — J45.20 MILD INTERMITTENT ASTHMA WITHOUT COMPLICATION: ICD-10-CM

## 2025-05-29 RX ORDER — ALBUTEROL SULFATE 90 UG/1
2 INHALANT RESPIRATORY (INHALATION) EVERY 4 HOURS PRN
Qty: 18 G | Refills: 0 | Status: SHIPPED | OUTPATIENT
Start: 2025-05-29

## 2025-05-29 NOTE — TELEPHONE ENCOUNTER
Caller: Razia Sequeira    Relationship: Self    Best call back number: [phone]     Requested Prescriptions:   Requested Prescriptions     Pending Prescriptions Disp Refills    albuterol sulfate  (90 Base) MCG/ACT inhaler 18 g 0     Si puffs Every 4 (Four) Hours As Needed for Wheezing.        Pharmacy where request should be sent: [facility] [address] [place] [address] [phone] [facility] [phone]      Last office visit with prescribing clinician: 2025   Last telemedicine visit with prescribing clinician: Visit date not found   Next office visit with prescribing clinician: 2025     Additional details provided by patient:     Does the patient have less than a 3 day supply:  [x] Yes  [] No    Would you like a call back once the refill request has been completed: [] Yes [x] No    If the office needs to give you a call back, can they leave a voicemail: [] Yes [x] No    Raul Vázquez Rep   25 10:16 EDT

## 2025-06-16 ENCOUNTER — HOSPITAL ENCOUNTER (OUTPATIENT)
Facility: HOSPITAL | Age: 60
Discharge: HOME | End: 2025-06-16
Admitting: FAMILY MEDICINE
Payer: COMMERCIAL

## 2025-06-16 DIAGNOSIS — E78.00 HYPERCHOLESTEROLEMIA: ICD-10-CM

## 2025-06-16 PROCEDURE — 75571 CT HRT W/O DYE W/CA TEST: CPT

## 2025-06-26 DIAGNOSIS — J45.20 MILD INTERMITTENT ASTHMA WITHOUT COMPLICATION: ICD-10-CM

## 2025-06-26 RX ORDER — ALBUTEROL SULFATE 90 UG/1
2 INHALANT RESPIRATORY (INHALATION) EVERY 4 HOURS PRN
Qty: 18 G | Refills: 0 | Status: SHIPPED | OUTPATIENT
Start: 2025-06-26

## 2025-08-04 ENCOUNTER — OFFICE VISIT (OUTPATIENT)
Dept: FAMILY MEDICINE CLINIC | Facility: CLINIC | Age: 60
End: 2025-08-04
Payer: COMMERCIAL

## 2025-08-04 VITALS
BODY MASS INDEX: 35.45 KG/M2 | HEART RATE: 84 BPM | TEMPERATURE: 97.5 F | OXYGEN SATURATION: 97 % | SYSTOLIC BLOOD PRESSURE: 116 MMHG | DIASTOLIC BLOOD PRESSURE: 78 MMHG | HEIGHT: 66 IN | WEIGHT: 220.6 LBS

## 2025-08-04 DIAGNOSIS — I10 ESSENTIAL HYPERTENSION: Primary | ICD-10-CM

## 2025-08-04 DIAGNOSIS — J45.20 MILD INTERMITTENT ASTHMA WITHOUT COMPLICATION: ICD-10-CM

## 2025-08-04 DIAGNOSIS — E03.9 HYPOTHYROIDISM, UNSPECIFIED TYPE: ICD-10-CM

## 2025-08-04 DIAGNOSIS — J45.20 MILD INTERMITTENT REACTIVE AIRWAY DISEASE WITHOUT COMPLICATION: ICD-10-CM

## 2025-08-04 DIAGNOSIS — Z12.31 ENCOUNTER FOR SCREENING MAMMOGRAM FOR MALIGNANT NEOPLASM OF BREAST: ICD-10-CM

## 2025-08-04 PROCEDURE — 99214 OFFICE O/P EST MOD 30 MIN: CPT | Performed by: FAMILY MEDICINE

## 2025-08-04 RX ORDER — AZELASTINE 1 MG/ML
1 SPRAY, METERED NASAL DAILY
COMMUNITY

## 2025-08-04 RX ORDER — ALBUTEROL SULFATE 90 UG/1
2 INHALANT RESPIRATORY (INHALATION) EVERY 4 HOURS PRN
Qty: 18 G | Refills: 1 | Status: SHIPPED | OUTPATIENT
Start: 2025-08-04

## (undated) DEVICE — KT EVAC WND 3SPRNG 400CC W/DRN RND 3/16IN

## (undated) DEVICE — CVR PROB ULTRASND/TRANSD W/GEL 7X11IN STRL

## (undated) DEVICE — CATH DIAG EXPO .045 FL3.5 5F 100CM

## (undated) DEVICE — GLIDESHEATH SLENDER STAINLESS STEEL KIT: Brand: GLIDESHEATH SLENDER

## (undated) DEVICE — ST ACC MICROPUNCTURE .018 TRANSLSS/SS/TP 5F/10CM 21G/7CM

## (undated) DEVICE — GW AMPLTZ SUPERSTIFF STR .035IN 180CM

## (undated) DEVICE — ADULT, W/LG. BACK PAD, RADIOTRANSPARENT ELEMENT AND LEAD WIRE: Brand: DEFIBRILLATION ELECTRODES

## (undated) DEVICE — DEV COMP RAD PRELUDESYNC 24CM

## (undated) DEVICE — MODEL AT P65, P/N 701554-001KIT CONTENTS: HAND CONTROLLER, 3-WAY HIGH-PRESSURE STOPCOCK WITH ROTATING END AND PREMIUM HIGH-PRESSURE TUBING: Brand: ANGIOTOUCH® KIT

## (undated) DEVICE — CATH DIAG EXPO M/ PK 5F FL4/FR4 PIG

## (undated) DEVICE — DEV INFL MONARCH 25W

## (undated) DEVICE — CVR TRANSD FLX 3DIMEN 14X29.2CM LF STRL

## (undated) DEVICE — PK CATH CARD 10

## (undated) DEVICE — MODEL BT2000 P/N 700287-012KIT CONTENTS: MANIFOLD WITH SALINE AND CONTRAST PORTS, SALINE TUBING WITH SPIKE AND HAND SYRINGE, TRANSDUCER: Brand: BT2000 AUTOMATED MANIFOLD KIT

## (undated) DEVICE — SHEET,T,THYROID,STERILE: Brand: MEDLINE

## (undated) DEVICE — GW PERIPH GUIDERIGHT STD/EXCHNG/J/TIP SS 0.035IN 5X260CM